# Patient Record
Sex: FEMALE | Race: WHITE | Employment: OTHER | URBAN - METROPOLITAN AREA
[De-identification: names, ages, dates, MRNs, and addresses within clinical notes are randomized per-mention and may not be internally consistent; named-entity substitution may affect disease eponyms.]

---

## 2018-03-08 PROBLEM — M17.12 OSTEOARTHRITIS OF LEFT KNEE: Status: ACTIVE | Noted: 2018-03-08

## 2018-04-07 ENCOUNTER — HOSPITAL ENCOUNTER (EMERGENCY)
Age: 68
Discharge: HOME | End: 2018-04-07
Attending: EMERGENCY MEDICINE
Payer: COMMERCIAL

## 2018-04-07 VITALS
BODY MASS INDEX: 41.64 KG/M2 | OXYGEN SATURATION: 97 % | TEMPERATURE: 98 F | HEIGHT: 63 IN | WEIGHT: 235 LBS | SYSTOLIC BLOOD PRESSURE: 188 MMHG | RESPIRATION RATE: 16 BRPM | DIASTOLIC BLOOD PRESSURE: 70 MMHG | HEART RATE: 78 BPM

## 2018-04-07 DIAGNOSIS — R04.0 EPISTAXIS: Primary | ICD-10-CM

## 2018-04-07 PROCEDURE — 99283 EMERGENCY DEPT VISIT LOW MDM: CPT | Performed by: EMERGENCY MEDICINE

## 2018-04-07 NOTE — ED TRIAGE NOTES
Pt complains of frequent nose bleeds for the past 3 days. States tonight she was unable to stop the bleeding. Denies blood thinners. States 81mg asa today and tylenol for knee pain.

## 2018-04-08 NOTE — ED NOTES
I have reviewed discharge instructions with the patient. The patient verbalized understanding. Patient left ED via Discharge Method: ambulatory to Home with her   Opportunity for questions and clarification provided. Patient given 0 scripts. To continue your aftercare when you leave the hospital, you may receive an automated call from our care team to check in on how you are doing. This is a free service and part of our promise to provide the best care and service to meet your aftercare needs.  If you have questions, or wish to unsubscribe from this service please call [phone]. Thank you for Choosing our [facility].

## 2018-04-08 NOTE — DISCHARGE INSTRUCTIONS
Nosebleeds: Care Instructions  Your Care Instructions    Nosebleeds are common, especially if you have colds or allergies. Many things can cause a nosebleed. Some nosebleeds stop on their own with pressure. Others need packing. Some get cauterized (sealed). If you have gauze or other packing materials in your nose, you will need to follow up with your doctor to have the packing removed. You may need more treatment if you get nosebleeds a lot. The doctor has checked you carefully, but problems can develop later. If you notice any problems or new symptoms, get medical treatment right away. Follow-up care is a key part of your treatment and safety. Be sure to make and go to all appointments, and call your doctor if you are having problems. It's also a good idea to know your test results and keep a list of the medicines you take. How can you care for yourself at home? · If you get another nosebleed:  ¨ Sit up and tilt your head slightly forward. This keeps blood from going down your throat. ¨ Use your thumb and index finger to pinch your nose shut for 10 minutes. Use a clock. Do not check to see if the bleeding has stopped before the 10 minutes are up. If the bleeding has not stopped, pinch your nose shut for another 10 minutes. ¨ When the bleeding has stopped, try not to pick, rub, or blow your nose for 12 hours. Avoiding these things helps keep your nose from bleeding again. · If your doctor prescribed antibiotics, take them as directed. Do not stop taking them just because you feel better. You need to take the full course of antibiotics. To prevent nosebleeds  · Do not blow your nose too hard. · Try not to lift or strain after a nosebleed. · Raise your head on a pillow while you sleep. · Put a thin layer of a saline- or water-based nasal gel, such as NasoGel, inside your nose. Put it on the septum, which divides your nostrils. This will prevent dryness that can cause nosebleeds.   · Use a vaporizer or humidifier to add moisture to your bedroom. Follow the directions for cleaning the machine. · Do not use aspirin, ibuprofen (Advil, Motrin), or naproxen (Aleve) for 36 to 48 hours after a nosebleed unless your doctor tells you to. You can use acetaminophen (Tylenol) for pain relief. · Talk to your doctor about stopping any other medicines you are taking. Some medicines may make you more likely to get a nosebleed. · Do not use cold medicines or nasal sprays without first talking to your doctor. They can make your nose dry. When should you call for help? Call 911 anytime you think you may need emergency care. For example, call if:  ? · You passed out (lost consciousness). ?Call your doctor now or seek immediate medical care if:  ? · You get another nosebleed and your nose is still bleeding after you have applied pressure 3 times for 10 minutes each time (30 minutes total). ? · There is a lot of blood running down the back of your throat even after you pinch your nose and tilt your head forward. ? · You have a fever. ? · You have sinus pain. ? Watch closely for changes in your health, and be sure to contact your doctor if:  ? · You get nosebleeds often, even if they stop. ? · You do not get better as expected. Where can you learn more? Go to http://analy-leif.info/. Enter S156 in the search box to learn more about \"Nosebleeds: Care Instructions. \"  Current as of: March 20, 2017  Content Version: 11.4  © 9824-0394 [employer]. Care instructions adapted under license by [employer] (which disclaims liability or warranty for this information). If you have questions about a medical condition or this instruction, always ask your healthcare professional. [address] any warranty or liability for your use of this information.

## 2018-04-08 NOTE — ED PROVIDER NOTES
HPI Comments: 62-year-old white female presents with bleeding from her left nare off-and-on for the past 3 days. Tonight it bled for approximately 15-20 minutes therefore she came in. No pain. No trauma. She does take 81 mg aspirin but no other blood thinners. No other areas of bleeding. Patient is a 74 y.o. female presenting with epistaxis. The history is provided by the patient. Epistaxis           Past Medical History:   Diagnosis Date    Breast cancer ([facility] [address] 2007    DJD (degenerative joint disease) 4/30/2013    Bilateral knees-    Essential hypertension, benign 4/30/2013    Well controlled on current regimen.  Continue meds and f/u in 6 months    Generalized osteoarthrosis, involving multiple sites 4/30/2013    Hand eczema 4/30/2013    Mixed hyperlipidemia 4/30/2013    Morbid obesity ([facility] [address] 4/30/2013    Routine general medical examination at a health care facility 4/30/2013    Type II or unspecified type diabetes mellitus without mention of complication, not stated as uncontrolled 4/30/2013    Unspecified sleep apnea 4/30/2013    cpap       Past Surgical History:   Procedure Laterality Date    BREAST SURGERY PROCEDURE UNLISTED  08/2010    let breast cyst aspirated    HX BREAST LUMPECTOMY Right 2007    cancer    HX CHOLECYSTECTOMY  3/2005    HX TONSILLECTOMY      HX TUBAL LIGATION           Family History:   Problem Relation Age of Onset    Heart Disease Mother     Elevated Lipids Mother     Hypertension Mother     Diabetes Mother     Dementia Father 80    Heart Disease Father     Elevated Lipids Father     Hypertension Father     Diabetes Father     Cancer Sister 46     breast    Stroke Maternal Grandmother        Social History     Social History    Marital status:      Spouse name: N/A    Number of children: N/A    Years of education: N/A     Occupational History    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 Son lives in [place]. Daughter lives in [place]. ALLERGIES: Ambien [zolpidem]; Erythromycin; and Norvasc [amlodipine]    Review of Systems   Constitutional: Negative for fever. Respiratory: Negative for shortness of breath. Neurological: Negative for headaches. Vitals:    04/07/18 1912   BP: 195/74   Pulse: 79   Resp: 16   Temp: 98.1 °F (36.7 °C)   SpO2: 96%   Weight: 106.6 kg (235 lb)   Height: 5' 3\" (1.6 m)            Physical Exam   Constitutional: She appears well-developed and well-nourished. No distress. HENT:   Head: Normocephalic and atraumatic. Mouth/Throat: Oropharynx is clear and moist.   Irritation and erythema to the left septum. No active bleeding. Eyes: Conjunctivae are normal. Pupils are equal, round, and reactive to light. Neck: Normal range of motion. Neck supple. Cardiovascular: Normal rate. Pulmonary/Chest: Effort normal.   Neurological: She is alert. Skin: Skin is warm and dry. Psychiatric: She has a normal mood and affect. Nursing note and vitals reviewed. MDM  Number of Diagnoses or Management Options  Diagnosis management comments: Bleeding likely due to mild mucosal inflammation. Have advised her to use saline nose spray and Vaseline. Nose clamp as needed for further bleeding.         ED Course       Procedures

## 2018-08-27 ENCOUNTER — HOSPITAL ENCOUNTER (OUTPATIENT)
Dept: PHYSICAL THERAPY | Age: 68
Discharge: HOME | End: 2018-08-27
Payer: MEDICARE

## 2018-08-27 ENCOUNTER — HOSPITAL ENCOUNTER (OUTPATIENT)
Dept: SURGERY | Age: 68
Discharge: HOME | End: 2018-08-27
Payer: MEDICARE

## 2018-08-27 PROBLEM — R73.09 ELEVATED HEMOGLOBIN A1C: Status: ACTIVE | Noted: 2018-08-27

## 2018-08-27 PROBLEM — R79.89 ELEVATED SERUM CREATININE: Status: ACTIVE | Noted: 2018-08-27

## 2018-08-27 PROBLEM — R82.90 ABNORMAL URINALYSIS: Status: ACTIVE | Noted: 2018-08-27

## 2018-08-27 LAB
ANION GAP SERPL CALC-SCNC: 11 MMOL/L (ref 7–16)
APPEARANCE UR: ABNORMAL
APTT PPP: 28.3 SEC (ref 23.2–35.3)
ATRIAL RATE: 65 BPM
BACTERIA SPEC CULT: ABNORMAL
BACTERIA URNS QL MICRO: ABNORMAL /HPF
BILIRUB UR QL: NEGATIVE
BUN SERPL-MCNC: 35 MG/DL (ref 8–23)
CALCIUM SERPL-MCNC: 9.3 MG/DL (ref 8.3–10.4)
CALCULATED P AXIS, ECG09: 15 DEGREES
CALCULATED R AXIS, ECG10: 16 DEGREES
CALCULATED T AXIS, ECG11: 46 DEGREES
CHLORIDE SERPL-SCNC: 98 MMOL/L (ref 98–107)
CO2 SERPL-SCNC: 24 MMOL/L (ref 21–32)
COLOR UR: YELLOW
CREAT SERPL-MCNC: 1.53 MG/DL (ref 0.6–1)
DIAGNOSIS, 93000: NORMAL
EPI CELLS #/AREA URNS HPF: ABNORMAL /HPF
ERYTHROCYTE [DISTWIDTH] IN BLOOD BY AUTOMATED COUNT: 12.4 %
GLUCOSE SERPL-MCNC: 459 MG/DL (ref 65–100)
GLUCOSE UR STRIP.AUTO-MCNC: >1000 MG/DL
HCT VFR BLD AUTO: 35.3 % (ref 35.8–46.3)
HGB BLD-MCNC: 11.5 G/DL (ref 11.7–15.4)
HGB UR QL STRIP: NEGATIVE
INR PPP: 0.9
KETONES UR QL STRIP.AUTO: NEGATIVE MG/DL
LEUKOCYTE ESTERASE UR QL STRIP.AUTO: ABNORMAL
MCH RBC QN AUTO: 27.3 PG (ref 26.1–32.9)
MCHC RBC AUTO-ENTMCNC: 32.6 G/DL (ref 31.4–35)
MCV RBC AUTO: 83.8 FL (ref 79.6–97.8)
MUCOUS THREADS URNS QL MICRO: ABNORMAL /LPF
NITRITE UR QL STRIP.AUTO: POSITIVE
NRBC # BLD: 0 K/UL (ref 0–0.2)
P-R INTERVAL, ECG05: 182 MS
PH UR STRIP: 5 [PH] (ref 5–9)
PLATELET # BLD AUTO: 457 K/UL (ref 150–450)
PMV BLD AUTO: 9.8 FL (ref 9.4–12.3)
POTASSIUM SERPL-SCNC: 4.5 MMOL/L (ref 3.5–5.1)
PROT UR STRIP-MCNC: NEGATIVE MG/DL
PROTHROMBIN TIME: 12.3 SEC (ref 11.5–14.5)
Q-T INTERVAL, ECG07: 382 MS
QRS DURATION, ECG06: 90 MS
QTC CALCULATION (BEZET), ECG08: 397 MS
RBC # BLD AUTO: 4.21 M/UL (ref 4.05–5.2)
RBC #/AREA URNS HPF: ABNORMAL /HPF
SERVICE CMNT-IMP: ABNORMAL
SODIUM SERPL-SCNC: 133 MMOL/L (ref 136–145)
SP GR UR REFRACTOMETRY: 1.02 (ref 1–1.02)
UROBILINOGEN UR QL STRIP.AUTO: 0.2 EU/DL (ref 0.2–1)
VENTRICULAR RATE, ECG03: 65 BPM
WBC # BLD AUTO: 10.5 K/UL (ref 4.3–11.1)
WBC URNS QL MICRO: ABNORMAL /HPF

## 2018-08-27 PROCEDURE — 80048 BASIC METABOLIC PNL TOTAL CA: CPT

## 2018-08-27 PROCEDURE — 85730 THROMBOPLASTIN TIME PARTIAL: CPT

## 2018-08-27 PROCEDURE — 81001 URINALYSIS AUTO W/SCOPE: CPT

## 2018-08-27 PROCEDURE — 87088 URINE BACTERIA CULTURE: CPT

## 2018-08-27 PROCEDURE — 87186 SC STD MICRODIL/AGAR DIL: CPT

## 2018-08-27 PROCEDURE — 85610 PROTHROMBIN TIME: CPT

## 2018-08-27 PROCEDURE — G8979 MOBILITY GOAL STATUS: HCPCS

## 2018-08-27 PROCEDURE — 87641 MR-STAPH DNA AMP PROBE: CPT

## 2018-08-27 PROCEDURE — G8978 MOBILITY CURRENT STATUS: HCPCS

## 2018-08-27 PROCEDURE — 87086 URINE CULTURE/COLONY COUNT: CPT

## 2018-08-27 PROCEDURE — 97161 PT EVAL LOW COMPLEX 20 MIN: CPT

## 2018-08-27 PROCEDURE — 77030027138 HC INCENT SPIROMETER -A

## 2018-08-27 PROCEDURE — 85027 COMPLETE CBC AUTOMATED: CPT

## 2018-08-27 PROCEDURE — 93005 ELECTROCARDIOGRAM TRACING: CPT | Performed by: ANESTHESIOLOGY

## 2018-08-27 PROCEDURE — 36415 COLL VENOUS BLD VENIPUNCTURE: CPT

## 2018-08-27 PROCEDURE — G8980 MOBILITY D/C STATUS: HCPCS

## 2018-08-27 RX ORDER — ASPIRIN 81 MG/1
81 TABLET ORAL DAILY
COMMUNITY
End: 2019-05-25

## 2018-08-27 RX ORDER — CHROMIUM PICOLINATE 200 MCG
1 TABLET ORAL DAILY
COMMUNITY
End: 2018-09-18 | Stop reason: ALTCHOICE

## 2018-08-27 NOTE — PERIOP NOTES
Dr. Pricilla Padilla discussed pt's current medical status with surgeon (Dr. Ivonne Duffy). Pt's surgery to be rescheduled pending better control of diabetes. Per Dr. Pricilla Padilla, go ahead and scheduled Echo. Called Echo department. Echo scheduled at [facility] 9/18/18 at 2pm with arrival at main entrance at 1330. Called pt, informed of scheduled Echo. Also informed pt that per anesthesia, even if surgery is rescheduled she should still have echo performed. Pt verbalized understanding and agreeable to scheduled date/time.

## 2018-08-27 NOTE — ADVANCED PRACTICE NURSE
Total Joint Surgery Preoperative Chart Review      Patient ID:  Gwendolyn Maurice  [id number]  83 y.o.  1950  Surgeon: Dr. Mari Morales  Date of Surgery: 9/11/2018  Procedure: Total Left Knee Arthroplasty  Primary Care Physician: Della Mena [phone]  Specialty Physician(s):      Subjective:   Gwendolyn Maurice is a 79 y.o. WHITE OR  female who presents for preoperative evaluation for Total Left Knee arthroplasty. This is a preoperative chart review note based on data collected by the nurse at the surgical Pre-Assessment visit. Past Medical History:   Diagnosis Date    Breast cancer ([facility] [address] 2007    right breast    DJD (degenerative joint disease) 04/30/2013    Bilateral knees    Essential hypertension, benign 04/30/2013    on med for control     Generalized osteoarthrosis, involving multiple sites 4/30/2013    Hand eczema 4/30/2013    Heart murmur     usure of when she was diagnosed; denies echo    Mixed hyperlipidemia 04/30/2013    on med    Morbid obesity ([facility] [address] 04/30/2013    BMI=40.3    Routine general medical examination at a [facility] care facility 4/30/2013    Type II or unspecified type diabetes mellitus without mention of complication, not stated as uncontrolled 04/30/2013    oral and insulin meds; checks bs bid; average bs 300; last A1C=11.0 on 06/06/18    Unspecified sleep apnea 04/30/2013    c-pap; instructed to bring dos.        Past Surgical History:   Procedure Laterality Date    BREAST SURGERY PROCEDURE UNLISTED  08/2010    let breast cyst aspirated    HX BREAST LUMPECTOMY Right 2007    cancer    HX CHOLECYSTECTOMY  3/2005    HX TONSILLECTOMY      HX TUBAL LIGATION       Family History   Problem Relation Age of Onset    Heart Disease Mother     Elevated Lipids Mother     Hypertension Mother     Diabetes Mother     Dementia Father 80    Heart Disease Father     Elevated Lipids Father     Hypertension Father     Diabetes Father     No Known Problems Sister     No Known Problems Brother     Cancer Sister     Stroke Maternal Grandmother       Social History   Substance Use Topics    Smoking status: Never Smoker    Smokeless tobacco: Never Used    Alcohol use No       Prior to Admission medications    Medication Sig Start Date End Date Taking? Authorizing Provider   Calcium-Cholecalciferol, D3, (CALCIUM 600 WITH VITAMIN D3) 600 mg(1,500mg) -400 unit cap Take 1 Tab by mouth daily. Yes Historical Provider   aspirin delayed-release 81 mg tablet Take 81 mg by mouth daily. Take / use AM day of surgery  per anesthesia protocols. Yes Historical Provider   insulin lispro (HUMALOG) 100 unit/mL kwikpen 10 units three times a day before meals 6/19/18  Yes Branson Severin, NP   melatonin tab tablet Take 5 mg by mouth nightly. Yes Historical Provider   metFORMIN ER (GLUCOPHAGE XR) 500 mg tablet Take two twice daily  Indications: type 2 diabetes mellitus  Patient taking differently: Take 1,000 mg by mouth two (2) times a day. Take two twice daily  Indications: type 2 diabetes mellitus 6/6/18  Yes Branson Severin, NP   SITagliptin (JANUVIA) 100 mg tablet Take 1 Tab by mouth daily. 6/6/18  Yes Branson Severin, NP   lisinopril-hydroCHLOROthiazide (PRINZIDE, ZESTORETIC) 20-12.5 mg per tablet Take 2 Tabs by mouth daily. 6/6/18  Yes Branson Severin, NP   rosuvastatin (CRESTOR) 10 mg tablet Take 1 Tab by mouth nightly. 6/6/18  Yes Branson Severin, NP   diclofenac EC (VOLTAREN) 50 mg EC tablet Take 1 Tab by mouth two (2) times a day. 6/6/18  Yes Branson Severin, NP   insulin detemir U-100 (LEVEMIR U-100 INSULIN) 100 unit/mL injection 85 units at hs  Patient taking differently: 85 Units by SubCUTAneous route nightly. Take 68 units the night before surgery on 09/10/18  Indications: type 2 diabetes mellitus 6/6/18  Yes Branson Severin, NP   dilTIAZem CD (CARDIZEM CD) 240 mg ER capsule Take 1 Cap by mouth daily.  6/6/18  Yes Branson Severin, NP     Allergies   Allergen Reactions    Ambien [Zolpidem] Palpitations     Ambien CR heart race.  ambien-hypnotics hallucinations    Erythromycin Other (comments)     Increased heart rate     Norvasc [Amlodipine] Other (comments)     Leg swelling          Objective:     Physical Exam:   Patient Vitals for the past 24 hrs:   Temp Pulse BP SpO2   08/27/18 0905 96.5 °F (35.8 °C) 69 134/66 98 %       ECG:    EKG Results     Procedure 720 Value Units Date/Time    EKG, 12 LEAD, INITIAL [[id number]] Collected:  08/27/18 0854    Order Status:  Completed Updated:  08/27/18 1258     Ventricular Rate 65 BPM      Atrial Rate 65 BPM      P-R Interval 182 ms      QRS Duration 90 ms      Q-T Interval 382 ms      QTC Calculation (Bezet) 397 ms      Calculated P Axis 15 degrees      Calculated R Axis 16 degrees      Calculated T Axis 46 degrees      Diagnosis --     Normal sinus rhythm  Low voltage QRS  Borderline ECG  No previous ECGs available  Confirmed by BHAVESH PETIT (), Cristi Malloy ([id number]) on 8/27/2018 12:58:17 PM            Data Review:   Labs:   Recent Results (from the past 24 hour(s))   CBC W/O DIFF    Collection Time: 08/27/18  8:00 AM   Result Value Ref Range    WBC 10.5 4.3 - 11.1 K/uL    RBC 4.21 4.05 - 5.2 M/uL    HGB 11.5 (L) 11.7 - 15.4 g/dL    HCT 35.3 (L) 35.8 - 46.3 %    MCV 83.8 79.6 - 97.8 FL    MCH 27.3 26.1 - 32.9 PG    MCHC 32.6 31.4 - 35.0 g/dL    RDW 12.4 %    PLATELET 412 (H) 078 - 450 K/uL    MPV 9.8 9.4 - 12.3 FL    ABSOLUTE NRBC 0.00 0.0 - 0.2 K/uL   METABOLIC PANEL, BASIC    Collection Time: 08/27/18  8:00 AM   Result Value Ref Range    Sodium 133 (L) 136 - 145 mmol/L    Potassium 4.5 3.5 - 5.1 mmol/L    Chloride 98 98 - 107 mmol/L    CO2 24 21 - 32 mmol/L    Anion gap 11 7 - 16 mmol/L    Glucose 459 (HH) 65 - 100 mg/dL    BUN 35 (H) 8 - 23 MG/DL    Creatinine 1.53 (H) 0.6 - 1.0 MG/DL    GFR est AA 44 (L) >60 ml/min/1.73m2    GFR est non-AA 36 (L) >60 ml/min/1.73m2    Calcium 9.3 8.3 - 10.4 MG/DL   PROTHROMBIN TIME + INR    Collection Time: 08/27/18  8:00 AM   Result Value Ref Range    Prothrombin time 12.3 11.5 - 14.5 sec    INR 0.9     PTT    Collection Time: 08/27/18  8:00 AM   Result Value Ref Range    aPTT 28.3 23.2 - 35.3 SEC   URINALYSIS W/ RFLX MICROSCOPIC    Collection Time: 08/27/18  8:00 AM   Result Value Ref Range    Color YELLOW      Appearance CLOUDY      Specific gravity 1.021 1.001 - 1.023      pH (UA) 5.0 5.0 - 9.0      Protein NEGATIVE  NEG mg/dL    Glucose >1000 mg/dL    Ketone NEGATIVE  NEG mg/dL    Bilirubin NEGATIVE  NEG      Blood NEGATIVE  NEG      Urobilinogen 0.2 0.2 - 1.0 EU/dL    Nitrites POSITIVE (A) NEG      Leukocyte Esterase MODERATE (A) NEG      WBC 20-50 0 /hpf    RBC 0-3 0 /hpf    Epithelial cells 0-3 0 /hpf    Bacteria 2+ (H) 0 /hpf    Mucus 1+ (H) 0 /lpf   EKG, 12 LEAD, INITIAL    Collection Time: 08/27/18  8:54 AM   Result Value Ref Range    Ventricular Rate 65 BPM    Atrial Rate 65 BPM    P-R Interval 182 ms    QRS Duration 90 ms    Q-T Interval 382 ms    QTC Calculation (Bezet) 397 ms    Calculated P Axis 15 degrees    Calculated R Axis 16 degrees    Calculated T Axis 46 degrees    Diagnosis       Normal sinus rhythm  Low voltage QRS  Borderline ECG  No previous ECGs available  Confirmed by BHAVESH PETIT (), Bennie Rizvi ([id number]) on 8/27/2018 12:58:17 PM     MSSA/MRSA SC BY PCR, NASAL SWAB    Collection Time: 08/27/18  9:44 AM   Result Value Ref Range    Special Requests: NO SPECIAL REQUESTS      Culture result: (A)       MRSA target DNA not detected, SA target DNA detected. A MRSA negative, SA positive test result does not preclude MRSA nasal colonization.          Problem List:  )  Patient Active Problem List   Diagnosis Code    Hand eczema L30.9    DJD (degenerative joint disease) M19.90    Essential hypertension, benign I10    Mixed hyperlipidemia E78.2    Diabetes mellitus type 2, controlled ([address] E11.9    Unspecified sleep apnea G47.30    Morbid obesity ([facility] [address] E66.01    Routine general medical examination at a health care facility Z00.00    Generalized osteoarthrosis, involving multiple sites M15.9    Breast cancer ([facility] [address] C50.919    Glaucoma H40.9    Osteoarthritis of left knee M17.12    Elevated hemoglobin A1c R73.09    Elevated serum creatinine R79.89    Abnormal urinalysis R82.90       Total Joint Surgery Pre-Assessment Recommendations:          Abnormal urinalysis. Culture pending. Will call in antibiotic if indicated. Patient is to wear home CPAP during hospitalization. Patient with multiple comorbidities including:  BMI greater than 35, sleep apnea, advanced age 79. Patient would benefit from inpatient hospitalization with total knee surgery. 6/6/2018 Elevated A1C 11.0. Fasting glucose today 459. Patient was told to make appointment ASAP to get this under control. Renal protocol initiated. The patient's chart will be flagged renal risk. Renal RisK Alerts:  1. Caution with use of muscle relaxants and sedatives with reduced renal function  2. Caution with total amount of narcotics used   3. Avoid morphine if patient has reduced renal function due to accumulations of the highly active metabolite, morphine-6-glucuronide, which can lead to sedation and respiratory depression  4. Avoid nephrotoxic drugs such as ONTIVEROS inhibitors  5. Consider volume status monitoring in addition to Maya  6.  Ensure hand-off to hospitalist for appropriate perioperative IV fluid management             Signed By: BILLY Jimenez    August 27, 2018

## 2018-08-27 NOTE — PROGRESS NOTES
Latrell Claros  : [birth year](10 y.o.) [facility] at [address], [place] [address].  [phone]       Physical Therapy Prehab Plan of Treatment and Evaluation Summary:2018    ICD-10: Treatment Diagnosis:   · Pain in Left Knee (M25.562)  · Stiffness of Left Knee, Not elsewhere classified (M25.662)  · Difficulty in walking, Not elsewhere classified (R26.2)  Precautions/Allergies:   Ambien [zolpidem]; Erythromycin; and Norvasc [amlodipine]  MEDICAL/REFERRING DIAGNOSIS:  Unilateral primary osteoarthritis, left knee [M17.12]  REFERRING PHYSICIAN: Carol Kaye MD  DATE OF SURGERY: 18   Assessment:   Comments:  Pt. Used a w/c for prehab. She is able to ambulate short distances. Bilateral knee rom is very limited. We discussed importance of rom after surgery and motivated her to work hard to achieve it as it will be a challenge. She needs a right tka. She plans to go home with spouse. PROBLEM LIST (Impacting functional limitations):  Ms. Ron Salinas presents with the following left lower extremity(s) problems:  1. Strength  2. Range of Motion  3. Home Exercise Program  4. Pain   INTERVENTIONS PLANNED:  1. Home Exercise Program  2. Educational Discussion     TREATMENT PLAN: Effective Dates: 2018 TO 2018. Frequency/Duration: Patient to continue to perform home exercise program at least twice per day up until her surgery. GOALS: (Goals have been discussed and agreed upon with patient.)  Discharge Goals: Time Frame: 1 Day  1. Patient will demonstrate independence with a home exercise program designed to increase strength, range of motion and pain control to minimize functional deficits and optimize patient for total joint replacement. Rehabilitation Potential For Stated Goals: Good  Regarding Rodger Valeria Tyrell's therapy, I certify that the treatment plan above will be carried out by a therapist or under their direction.   Thank you for this referral,  Cuauhtemoc Gibbs, SALDE               HISTORY:   Present Symptoms:  Pain Intensity 1:  (8 at worst)  Pain Location 1: Knee   History of Present Injury/Illness (Reason for Referral):  Medical/Referring Diagnosis: Unilateral primary osteoarthritis, left knee [M17.12]   Past Medical History/Comorbidities:   Ms. Odette Claros  has a past medical history of Breast cancer ([facility] [address] (2007); DJD (degenerative joint disease) (4/30/2013); Essential hypertension, benign (4/30/2013); Generalized osteoarthrosis, involving multiple sites (4/30/2013); Hand eczema (4/30/2013); Mixed hyperlipidemia (4/30/2013); Morbid obesity ([facility] [address] (4/30/2013); Routine general medical examination at a health care facility (4/30/2013); Type II or unspecified type diabetes mellitus without mention of complication, not stated as uncontrolled (4/30/2013); and Unspecified sleep apnea (4/30/2013). Ms. Oedtte Claros  has a past surgical history that includes pr breast surgery procedure unlisted (08/2010); hx cholecystectomy (3/2005); hx tonsillectomy; hx tubal ligation; and hx breast lumpectomy (Right, 2007).   Social History/Living Environment:   Home Environment: Private residence  # Steps to Enter: 4  Rails to Enter: Yes  Hand Rails : Right  Wheelchair Ramp: Yes  One/Two Story Residence: One story  Living Alone: No  Support Systems: Spouse/Significant Other/Partner  Patient Expects to be Discharged to[de-identified] Private residence  Current DME Used/Available at Home: Wheelchair, Grab bars  Tub or Shower Type: Tub/Shower combination  Work/Activity:  retired  Dominant Side:  RIGHT  Current Medications:  See Pre-assessment nursing note   Number of Personal Factors/Comorbidities that affect the Plan of Care: 3+: HIGH COMPLEXITY   EXAMINATION:   ADLs (Current Functional Status):   Ambulation:  [x] Independent  [] Walk Indoors Only  [] Walk Outdoors  [] Use Assistive Device  [x] Use Wheelchair Only Dressing:  [x] Independent  Requires Assistance from Someone for:  [] Sock/Shoes  [] Pants  [] Everything   Bathing/Showering:   [x] Independent  [] Requires Assistance from Someone  [] Sponge Bath Only Household Activities:  [] Routine house and yard work  [x] Light Housework Only  [] None   Observation/Orthostatic Postural Assessment:   Exceptions to [employer] shoulders  ROM/Flexibility:   Gross Assessment: Yes  AROM: Generally decreased, functional (right LE, right knee very limited)                LLE Assessment  LLE Assessment (WDL): Exception to WDL  LLE AROM  L Knee Flexion: 55  L Knee Extension: 12          Strength:   Gross Assessment: Yes  Strength: Generally decreased, functional (right LE, knee limited)       LLE Strength  L Knee Flexion: 3  L Knee Extension: 3          Functional Mobility:    Gross Assessment: Yes    Gait Description (WDL): Exceptions to WDL  Stand to Sit: Stand-by assistance, Additional time  Sit to Stand: Stand-by assistance, Additional time  Distance (ft): 25 Feet (ft)  Ambulation - Level of Assistance: Stand-by assistance;Supervision  Speed/Radha: Delayed  Step Length: Left shortened;Right shortened  Gait Abnormalities: Antalgic;Trunk sway increased          Balance:    Sitting: Intact  Standing: Intact, With support   Body Structures Involved:  1. Bones  2. Joints  3. Muscles  4. Ligaments Body Functions Affected:  1. Movement Related Activities and Participation Affected:  1. Mobility   Number of elements that affect the Plan of Care: 3: MODERATE COMPLEXITY   CLINICAL PRESENTATION:   Presentation: Stable and uncomplicated: LOW COMPLEXITY   CLINICAL DECISION MAKING:   Outcome Measure: Tool Used: Lower Extremity Functional Scale (LEFS)  Score:  Initial: 28/80 Most Recent: X/80 (Date: -- )   Interpretation of Score: 20 questions each scored on a 5 point scale with 0 representing \"extreme difficulty or unable to perform\" and 4 representing \"no difficulty\". The lower the score, the greater the functional disability. 80/80 represents no disability. Minimal detectable change is 9 points. Score 80 79-65 64-49 48-33 32-17 16-1 0   Modifier CH CI CJ CK CL CM CN     ? Mobility - Walking and Moving Around:     - CURRENT STATUS: CL - 60%-79% impaired, limited or restricted    - GOAL STATUS: CL - 60%-79% impaired, limited or restricted    - D/C STATUS:  CL - 60%-79% impaired, limited or restricted  Medical Necessity:   · Ms. Suzie Eng is expected to optimize her lower extremity strength and ROM in preparation for joint replacement surgery. Reason for Services/Other Comments:  · Achieve baseline assesment of musculoskeletal system, functional mobility and home environment. , educate in PT HEP in preparation for surgery, educate in hospital plan of care. Use of outcome tool(s) and clinical judgement create a POC that gives a: Clear prediction of patient's progress: LOW COMPLEXITY   TREATMENT:   Treatment/Session Assessment:  Patient was instructed in PT- HEP to increase strength and ROM in LEs. Answered all questions. · Post session pain:  Knee pain  · Compliance with Program/Exercises: Will assess as treatment progresses.   Total Treatment Duration:  PT Patient Time In/Time Out  Time In: 0715  Time Out: [address], PT

## 2018-08-27 NOTE — PERIOP NOTES
Dr. Pamela Christian,     Your patient was seen in [place] [address] today. I am attaching the results of the labs for your to review and follow-up with if necessary. If you would like to order additional labs or tests please enter into [facility] or fax to [phone]. Please do not respond to this message as my mailbox is not monitored. You may call [phone] with questions or concerns. Urine culture ordered by Meagan Beatty NP.      Recent Results (from the past 12 hour(s))   CBC W/O DIFF    Collection Time: 08/27/18  8:00 AM   Result Value Ref Range    WBC 10.5 4.3 - 11.1 K/uL    RBC 4.21 4.05 - 5.2 M/uL    HGB 11.5 (L) 11.7 - 15.4 g/dL    HCT 35.3 (L) 35.8 - 46.3 %    MCV 83.8 79.6 - 97.8 FL    MCH 27.3 26.1 - 32.9 PG    MCHC 32.6 31.4 - 35.0 g/dL    RDW 12.4 %    PLATELET 067 (H) 098 - 450 K/uL    MPV 9.8 9.4 - 12.3 FL    ABSOLUTE NRBC 0.00 0.0 - 0.2 K/uL   METABOLIC PANEL, BASIC    Collection Time: 08/27/18  8:00 AM   Result Value Ref Range    Sodium 133 (L) 136 - 145 mmol/L    Potassium 4.5 3.5 - 5.1 mmol/L    Chloride 98 98 - 107 mmol/L    CO2 24 21 - 32 mmol/L    Anion gap 11 7 - 16 mmol/L    Glucose 459 (HH) 65 - 100 mg/dL    BUN 35 (H) 8 - 23 MG/DL    Creatinine 1.53 (H) 0.6 - 1.0 MG/DL    GFR est AA 44 (L) >60 ml/min/1.73m2    GFR est non-AA 36 (L) >60 ml/min/1.73m2    Calcium 9.3 8.3 - 10.4 MG/DL   PROTHROMBIN TIME + INR    Collection Time: 08/27/18  8:00 AM   Result Value Ref Range    Prothrombin time 12.3 11.5 - 14.5 sec    INR 0.9     PTT    Collection Time: 08/27/18  8:00 AM   Result Value Ref Range    aPTT 28.3 23.2 - 35.3 SEC   URINALYSIS W/ RFLX MICROSCOPIC    Collection Time: 08/27/18  8:00 AM   Result Value Ref Range    Color YELLOW      Appearance CLOUDY      Specific gravity 1.021 1.001 - 1.023      pH (UA) 5.0 5.0 - 9.0      Protein NEGATIVE  NEG mg/dL    Glucose >1000 mg/dL    Ketone NEGATIVE  NEG mg/dL    Bilirubin NEGATIVE  NEG      Blood NEGATIVE  NEG      Urobilinogen 0.2 0.2 - 1.0 EU/dL    Nitrites POSITIVE (A) NEG      Leukocyte Esterase MODERATE (A) NEG      WBC 20-50 0 /hpf    RBC 0-3 0 /hpf    Epithelial cells 0-3 0 /hpf    Bacteria 2+ (H) 0 /hpf    Mucus 1+ (H) 0 /lpf   EKG, 12 LEAD, INITIAL    Collection Time: 08/27/18  8:54 AM   Result Value Ref Range    Ventricular Rate 65 BPM    Atrial Rate 65 BPM    P-R Interval 182 ms    QRS Duration 90 ms    Q-T Interval 382 ms    QTC Calculation (Bezet) 397 ms    Calculated P Axis 15 degrees    Calculated R Axis 16 degrees    Calculated T Axis 46 degrees    Diagnosis       Normal sinus rhythm  Low voltage QRS  Borderline ECG  No previous ECGs available     MSSA/MRSA SC BY PCR, NASAL SWAB    Collection Time: 08/27/18  9:44 AM   Result Value Ref Range    Special Requests: NO SPECIAL REQUESTS      Culture result: (A)       MRSA target DNA not detected, SA target DNA detected. A MRSA negative, SA positive test result does not preclude MRSA nasal colonization.

## 2018-08-27 NOTE — PERIOP NOTES
Contacted Echo scheduling department for [facility]. First available echo appointment 9/18/18. Waiting for call back from Dr. Anjelica Hopson office to confirm surgery to remain scheduled for 9/11/18 or to be rescheduled d/t pt's uncontrolled diabetes.

## 2018-08-27 NOTE — PERIOP NOTES
Patient verified name, , and surgery as listed in [facility]. Type 3 surgery, walk in assessment complete. Labs per surgeon: CBC, BMP, U/A, PT/PTT ; results within [facility] protocols except glucose of 459 and creatinine of 1.53. U/A abnormal. MRSA nasal swab pending. Labs per anesthesia protocol: included in surgeons orders. EKG: done today; within [facility] protocols. Ochoa at Dr. Olivier Jarquin office called over stating she received a call from the lab with critical Glucose of 459. Per Lab note:   \"RESULTS VERIFIED, PHONED TO AND READ BACK BY   CARMELLA MCCAIN FOR DR Javier Berger AT 8787 ON 18 KD\"    Ashkan Argueta NP notified of abnormal U/A result, elevated creatinine, and glucose of 459. Requests that patient is seen by PCP to address elevated glucose and received order for urine culture. Called PCP office Dipak Wolf NP) and made appointment for patient to be seen today at 1:45 pm to have glucose addressed. Patient is leaving for vacation on 18-18. Patient stated today that glucose has been in the 300's lately and this morning was 350. Patient aware if glucose is > 300 day of surgery that surgery maybe cancelled. Called Dr. Yaakov Belcher with anesthesia two different times to address heart murmur history without echo and glucose of 459. The first call he was unable to come to the phone. The second call he cut me off and said he would come over. After waiting almost 30 minutes Dr. Vickey Saxena with anesthesia was called. Dr. Vickey Saxena came over and met with patient and addressed murmur and glucose. Also made him aware of elevated creatinine. Informed him of appointment with pcp today at 1:45 and received order for echo prior to surgery. After patient spoke with Dr. Vickey Saxena she is considering delaying surgery until blood sugar is better controlled and to possibly get referral to endocrinology from pcp today while there.  Ochoa at Dr. Olivier Jarquin office called and left detailed voicemail regarding this with call back number [phone] to confirm VM was receive. Hibiclens and instructions to return bottle on DOS given per hospital policy. Nasal Swab collected per MD order and instructions for Mupirocin nasal ointment if required. Patient provided with handouts including Guide to Surgery, Pain Management, Hand Hygiene, Blood Transfusion Education, and [facility] Brochure. Patient answered medical/surgical history questions at their best of ability. All prior to admission medications documented in [facility]. Original medication prescription bottles visualized during patient appointment. Patient instructed to hold all vitamins 7 days prior to surgery and NSAIDS 5 days prior to surgery. Medications to be held: diclofenac, vitamins. Instructed to take/use 68 units of Levemir the night before surgery on 09/10/18. Patient instructed to continue previous medications as prescribed prior to surgery and to take the following medications the day of surgery according to anesthesia guidelines with a small sip of water: 81 mg aspirin, cartia. Instructed to bring: c-pap, metformin er, and [employer]. Patient teach back successful and patient demonstrates knowledge of instruction.

## 2018-08-27 NOTE — PERIOP NOTES
Katlyn Lowery NP    Your patient recently had labs drawn during a hospital appointment due to an upcoming surgery. The results are attached. If you have any questions or concerns please reach out to your patient for a follow-up in your office. Please do not respond to this message as my mailbox is not monitored. You may call [phone] with questions or concerns. Urine culture ordered. Please note glucose of 459. Thanks!     Recent Results (from the past 12 hour(s))   CBC W/O DIFF    Collection Time: 08/27/18  8:00 AM   Result Value Ref Range    WBC 10.5 4.3 - 11.1 K/uL    RBC 4.21 4.05 - 5.2 M/uL    HGB 11.5 (L) 11.7 - 15.4 g/dL    HCT 35.3 (L) 35.8 - 46.3 %    MCV 83.8 79.6 - 97.8 FL    MCH 27.3 26.1 - 32.9 PG    MCHC 32.6 31.4 - 35.0 g/dL    RDW 12.4 %    PLATELET 298 (H) 647 - 450 K/uL    MPV 9.8 9.4 - 12.3 FL    ABSOLUTE NRBC 0.00 0.0 - 0.2 K/uL   METABOLIC PANEL, BASIC    Collection Time: 08/27/18  8:00 AM   Result Value Ref Range    Sodium 133 (L) 136 - 145 mmol/L    Potassium 4.5 3.5 - 5.1 mmol/L    Chloride 98 98 - 107 mmol/L    CO2 24 21 - 32 mmol/L    Anion gap 11 7 - 16 mmol/L    Glucose 459 (HH) 65 - 100 mg/dL    BUN 35 (H) 8 - 23 MG/DL    Creatinine 1.53 (H) 0.6 - 1.0 MG/DL    GFR est AA 44 (L) >60 ml/min/1.73m2    GFR est non-AA 36 (L) >60 ml/min/1.73m2    Calcium 9.3 8.3 - 10.4 MG/DL   PROTHROMBIN TIME + INR    Collection Time: 08/27/18  8:00 AM   Result Value Ref Range    Prothrombin time 12.3 11.5 - 14.5 sec    INR 0.9     PTT    Collection Time: 08/27/18  8:00 AM   Result Value Ref Range    aPTT 28.3 23.2 - 35.3 SEC   URINALYSIS W/ RFLX MICROSCOPIC    Collection Time: 08/27/18  8:00 AM   Result Value Ref Range    Color YELLOW      Appearance CLOUDY      Specific gravity 1.021 1.001 - 1.023      pH (UA) 5.0 5.0 - 9.0      Protein NEGATIVE  NEG mg/dL    Glucose >1000 mg/dL    Ketone NEGATIVE  NEG mg/dL    Bilirubin NEGATIVE  NEG      Blood NEGATIVE  NEG      Urobilinogen 0.2 0.2 - 1.0 EU/dL Nitrites POSITIVE (A) NEG      Leukocyte Esterase MODERATE (A) NEG      WBC 20-50 0 /hpf    RBC 0-3 0 /hpf    Epithelial cells 0-3 0 /hpf    Bacteria 2+ (H) 0 /hpf    Mucus 1+ (H) 0 /lpf

## 2018-08-27 NOTE — PERIOP NOTES
Hilton Bartlett NP:      Patient: Shayy Real, [address] 09/11/18    During a recent visit to the surgical preadmission testing center, the above mentioned patient was found to have a non-fasting blood glucose level of 459 mg/dL. This may indicate inadequate diabetic management and raises concerns that the patient is not medically optimized for surgery. It is our standard practice to postpone elective surgery for patients who present fasting blood glucose level >300 mg/dL on the day of their procedure. The patient has been advised of this policy and counseled on the importance of glucose control. We feel that this patient is at increased risk of cancellation; however, their blood glucose may be in acceptable range when they are NPO. Therefore, we will leave the decision to you whether to delay the surgery and refer the patient to their primary care provider or keep them as currently scheduled. Our goal is to prevent as many delays and cancellations as possible while ensuring patient safety.     Sincerely,    [facility]

## 2018-08-29 VITALS
BODY MASS INDEX: 40.31 KG/M2 | WEIGHT: 227.5 LBS | SYSTOLIC BLOOD PRESSURE: 134 MMHG | DIASTOLIC BLOOD PRESSURE: 66 MMHG | HEIGHT: 63 IN | HEART RATE: 69 BPM | TEMPERATURE: 96.5 F | OXYGEN SATURATION: 98 %

## 2018-08-29 LAB
BACTERIA SPEC CULT: ABNORMAL
SERVICE CMNT-IMP: ABNORMAL

## 2018-08-29 NOTE — PROGRESS NOTES
08/27/18 0730   Oxygen Therapy   O2 Sat (%) 95 %   Pulse via Oximetry 73 beats per minute   O2 Device Room air   Pre-Treatment   Breath Sounds Bilateral Clear   Pre FEV1 (liters) 2 liters   % Predicted 85   Incentive Spirometry Treatment   Actual Volume (ml) 2000 ml     Initial respiratory Assessment completed with pt. Pt was interviewed and evaluated in [facility] prior to surgery. Patient ID:  Katelin David  [id number]  86 y.o.  1950  Surgeon: Dr. Danny Alcala  Date of Surgery: 9/11/2018  Procedure: Total Left Knee Arthroplasty  Primary Care Physician: Karely Mora -[phone]  Specialists:                                  Pt instructed in the use of Incentive Spirometry. Pt instructed to bring Incentive Spirometer back on date of surgery & to start using Is upon return to pt room. Pt taught proper cough technique    History of smoking:   NONE                                                       Quit date:           Secondhand smoke:FATHER      Past procedures with Oxygen desaturation:DIFFICULT INTUBATION    Past Medical History:   Diagnosis Date    Breast cancer ([facility] [address] 2007    right breast    DJD (degenerative joint disease) 04/30/2013    Bilateral knees    Essential hypertension, benign 04/30/2013    on med for control     Generalized osteoarthrosis, involving multiple sites 4/30/2013    Hand eczema 4/30/2013    Heart murmur     usure of when she was diagnosed; denies echo    Mixed hyperlipidemia 04/30/2013    on med    Morbid obesity ([address] 04/30/2013    BMI=40.3    Routine general medical examination at a health care facility 4/30/2013    Type II or unspecified type diabetes mellitus without mention of complication, not stated as uncontrolled 04/30/2013    oral and insulin meds; checks bs bid; average bs 300; last A1C=11.0 on 06/06/18    Unspecified sleep apnea 04/30/2013    c-pap; instructed to bring dos. Respiratory history:HX OF PNA                                 SOB  ON EXERTION                                    Respiratory meds:                                         FAMILY PRESENT:                                                       NO                                        PAST SLEEP STUDY:        YES                  8/10/11  HX OF [place]:                        YES                                                    [place] assessment:       RLS                                                 PHYSICAL EXAM   Body mass index is 40.3 kg/(m^2).    Visit Vitals    /66 (BP 1 Location: Right arm, BP Patient Position: At rest;Sitting)    Pulse 69    Temp 96.5 °F (35.8 °C)    Ht 5' 3\" (1.6 m)    Wt 103.2 kg (227 lb 8 oz)    SpO2 98%    BMI 40.3 kg/m2     Neck circumference:   46   cm    Loud snoring:        YES                             Witnessed apnea or wakening gasping or choking:,                                                                                                            APNEA    Awakens with headaches:                                                  DENIES    Morning or daytime tiredness/ sleepiness:                                                                                                          TIRED   Dry mouth or sore throat in morning:                YES                                                                            Mobley stage:  4    SACS score:58    STOP/BAN                              CPAP:                                                          HOME CPAP              Referrals:    Pt. Phone Number:

## 2018-09-18 ENCOUNTER — HOSPITAL ENCOUNTER (OUTPATIENT)
Dept: NON INVASIVE DIAGNOSTICS | Age: 68
Discharge: HOME | End: 2018-09-18
Attending: ANESTHESIOLOGY
Payer: MEDICARE

## 2018-09-18 DIAGNOSIS — R01.1 HEART MURMUR: ICD-10-CM

## 2018-09-18 PROBLEM — E11.21 TYPE 2 DIABETES WITH NEPHROPATHY (HCC): Status: ACTIVE | Noted: 2018-09-18

## 2018-09-18 PROCEDURE — 93306 TTE W/DOPPLER COMPLETE: CPT

## 2018-09-21 ENCOUNTER — HOSPITAL ENCOUNTER (OUTPATIENT)
Dept: LAB | Age: 68
Discharge: HOME | End: 2018-09-21
Payer: MEDICARE

## 2018-09-21 LAB
ALBUMIN SERPL-MCNC: 3.4 G/DL (ref 3.2–4.6)
ALBUMIN/GLOB SERPL: 0.9 {RATIO}
ALP SERPL-CCNC: 80 U/L (ref 50–136)
ALT SERPL-CCNC: 32 U/L (ref 12–65)
ANION GAP SERPL CALC-SCNC: 11 MMOL/L
AST SERPL-CCNC: 20 U/L (ref 15–37)
BILIRUB SERPL-MCNC: 0.5 MG/DL (ref 0.2–1.1)
BUN SERPL-MCNC: 32 MG/DL (ref 8–23)
CALCIUM SERPL-MCNC: 9.1 MG/DL (ref 8.3–10.4)
CHLORIDE SERPL-SCNC: 104 MMOL/L (ref 98–107)
CO2 SERPL-SCNC: 22 MMOL/L (ref 21–32)
CREAT SERPL-MCNC: 1.4 MG/DL (ref 0.6–1)
GLOBULIN SER CALC-MCNC: 4 G/DL
GLUCOSE SERPL-MCNC: 256 MG/DL (ref 65–100)
POTASSIUM SERPL-SCNC: 4.8 MMOL/L (ref 3.5–5.1)
PROT SERPL-MCNC: 7.4 G/DL (ref 6.3–8.2)
SODIUM SERPL-SCNC: 137 MMOL/L (ref 136–145)

## 2018-09-21 PROCEDURE — 36415 COLL VENOUS BLD VENIPUNCTURE: CPT

## 2018-09-21 PROCEDURE — 80053 COMPREHEN METABOLIC PANEL: CPT

## 2018-09-21 NOTE — PROGRESS NOTES
Please notify pt that her electrolytes on her repeat lab were normal and previous abnormalities were likely not clinically significant. Thanks for having labs done. Glucose is much improved. Pt should now be trying to use correction scale insulin. We will continue to monitor her kidney function, good glucose and BP control will help to protect her kidneys.

## 2018-10-29 ENCOUNTER — HOSPITAL ENCOUNTER (OUTPATIENT)
Dept: DIABETES SERVICES | Age: 68
Discharge: HOME | End: 2018-10-29
Payer: MEDICARE

## 2018-10-29 PROCEDURE — G0108 DIAB MANAGE TRN  PER INDIV: HCPCS

## 2018-10-29 NOTE — PROGRESS NOTES
Came for diabetes educational assessment today. Provided basic information on carbohydrates, proteins and fats. Educational need/plan: Will attend 2 nutrition/2 diabetes sessions to address the following: diabetes disease process, nutritional management, physical activity, using medications, preventing complications, psychosocial adjustment, goal setting, problem solving, monitoring, behavior change strategies. Patient admits to having kidney issues during appointment, but no noted dietary restrictions per patient. Stage 3 CKD in chart and last K+ 4.8. She cannot exercise due to needing both knees replaced. She reports told in 1950's to not have knees replaced too early. Now she reports they are telling her she waited too long. Seeing Vishnu Cid for assist with blood sugar control and patient reports improvement. Denies symptoms with elevations but does have symptoms with low blood sugars.  Sneha Haskins with her.

## 2018-10-30 ENCOUNTER — HOSPITAL ENCOUNTER (OUTPATIENT)
Dept: DIABETES SERVICES | Age: 68
Discharge: HOME | End: 2018-10-30
Payer: MEDICARE

## 2018-10-30 PROCEDURE — G0109 DIAB MANAGE TRN IND/GROUP: HCPCS

## 2018-10-30 NOTE — PROGRESS NOTES
Participant attended Diabetes #1 session today. Topics included: Characteristics/pathophysiology type 1/type 2 diabetes; Goal/acceptable blood glucose ranges/Hgb A1C/interpreting/using results;meters, continuous glucose monitors and insulin pumps. Using medications safely; Sick day management; Prevention/detection/treatment of acute complications. - Verbalized understanding of material covered.   -Goal for next session Diabetes Two  -Anticipated adherence is good   -Problems/barriers  none anticipated

## 2018-11-13 ENCOUNTER — HOSPITAL ENCOUNTER (OUTPATIENT)
Dept: DIABETES SERVICES | Age: 68
Discharge: HOME | End: 2018-11-13
Attending: PHYSICIAN ASSISTANT
Payer: MEDICARE

## 2018-11-13 PROCEDURE — G0109 DIAB MANAGE TRN IND/GROUP: HCPCS

## 2018-11-13 NOTE — PROGRESS NOTES
Participant attended Diabetes #2 session today. Topics included: Prevention/detection/treatment of chronic complications; sleep apnea; Developing strategies to promote health/change behavior/recommended screenings; Developing strategies to address psychosocial issues; Goal setting. Participants goal/support plan includes Medical Goal:  To control my blood sugars I will increase my upper body exercise three times a week starting tomorrow as long as it takes and reevaluate in two months. Medical Plan:   is my support and I will start the [employer] with him and he will help me.  . Problems/barriers may be: none anticipated Plan for follow up/Recommendations: mail follow up survey in 3 months

## 2018-11-19 ENCOUNTER — HOSPITAL ENCOUNTER (OUTPATIENT)
Dept: DIABETES SERVICES | Age: 68
Discharge: HOME | End: 2018-11-19
Attending: PHYSICIAN ASSISTANT
Payer: MEDICARE

## 2018-11-19 PROCEDURE — G0109 DIAB MANAGE TRN IND/GROUP: HCPCS

## 2018-11-28 ENCOUNTER — HOSPITAL ENCOUNTER (OUTPATIENT)
Dept: DIABETES SERVICES | Age: 68
Discharge: HOME | End: 2018-11-28
Attending: PHYSICIAN ASSISTANT
Payer: MEDICARE

## 2018-11-28 PROCEDURE — G0109 DIAB MANAGE TRN IND/GROUP: HCPCS

## 2018-11-28 NOTE — PROGRESS NOTES
Attended nutrition diabetes #2 group session today. Topics included: plate method for portion control; fiber and sodium guidelines; sugar substitutes; alcohol; eating out; recipe modification; label reading. Voiced/demonstrated understanding of material covered. Anticipated adherence is good. Pt's nutrition goal: To lower her blood sugars and get her knees done, she will eat more chicken and turkey rather than red meats at home and out and re-evaluate in 3 months. Pt's support plan: Use the grocery handout and other handouts from class and the websites given in class. Problems/barriers: Can't exercise right now. Recommend check pre-meal blood glucose and 2 hour post-prandial blood glucose to see how food choices affect blood glucose. Plan for follow up is: will be sent a follow up questionnaire in 3, 6 and 12 months. June 21, 2018      Timmy Montemayor MD  [address] [facility] [address] [facility]  2nd Floor  [place] [address]           [facility]  [address] [facility] [address] [place] [address]  Phone: [phone]          Patient: Stephani Willoughby   MR Number: [id number]   YOB: 1955   Date of Visit: 6/21/2018       Dear Dr. Timmy Montemayor:    Thank you for referring Stephani Willoughby to me for evaluation. Attached you will find relevant portions of my assessment and plan of care.    If you have questions, please do not hesitate to call me. I look forward to following Stephani Willoughby along with you.    Sincerely,    Samuel Cote MD    Enclosure  CC:  No Recipients    If you would like to receive this communication electronically, please contact [email or web address] or [phone] to request more information on [facility] access.    For providers and/or their staff who would like to refer a patient to [facility], please contact us through our one-stop-shop provider referral line, [facility], at [phone].    If you feel you have received this communication in error or would no longer like to receive these types of communications, please e-mail [email or web address]

## 2019-01-31 ENCOUNTER — HOSPITAL ENCOUNTER (OUTPATIENT)
Dept: DIABETES SERVICES | Age: 69
Discharge: HOME | End: 2019-01-31
Payer: MEDICARE

## 2019-01-31 PROCEDURE — G0109 DIAB MANAGE TRN IND/GROUP: HCPCS

## 2019-01-31 NOTE — PROGRESS NOTES
Attended diabetes follow up group class. Open forum for clients to ask questions based on entire diabetes self management education program. Education topics are driven in this class based on clients' individual questions and needs. Topics included: fats, proteins, cholesterol, sugar substitutes, portion control, meal planning, label reading, eating out, weight loss, exercise, Hgb A1C, medications, stress, signs/symptoms of high and low blood sugars and blood sugar goals.

## 2019-03-19 PROBLEM — R73.09 ELEVATED HEMOGLOBIN A1C: Status: RESOLVED | Noted: 2018-08-27 | Resolved: 2019-03-19

## 2019-05-15 ENCOUNTER — HOSPITAL ENCOUNTER (OUTPATIENT)
Dept: SURGERY | Age: 69
Discharge: HOME | End: 2019-05-15
Attending: ORTHOPAEDIC SURGERY
Payer: MEDICARE

## 2019-05-15 VITALS
OXYGEN SATURATION: 95 % | HEIGHT: 64 IN | DIASTOLIC BLOOD PRESSURE: 72 MMHG | SYSTOLIC BLOOD PRESSURE: 145 MMHG | BODY MASS INDEX: 40.12 KG/M2 | TEMPERATURE: 99 F | WEIGHT: 235 LBS | HEART RATE: 94 BPM

## 2019-05-15 LAB
ANION GAP SERPL CALC-SCNC: 8 MMOL/L (ref 7–16)
APPEARANCE UR: CLEAR
APTT PPP: 31.8 SEC (ref 24.7–39.8)
BACTERIA SPEC CULT: NORMAL
BACTERIA URNS QL MICRO: ABNORMAL /HPF
BASOPHILS # BLD: 0.1 K/UL (ref 0–0.2)
BASOPHILS NFR BLD: 0 % (ref 0–2)
BILIRUB UR QL: NEGATIVE
BUN SERPL-MCNC: 26 MG/DL (ref 8–23)
CALCIUM SERPL-MCNC: 9.1 MG/DL (ref 8.3–10.4)
CASTS URNS QL MICRO: ABNORMAL /LPF
CHLORIDE SERPL-SCNC: 105 MMOL/L (ref 98–107)
CO2 SERPL-SCNC: 24 MMOL/L (ref 21–32)
COLOR UR: YELLOW
CREAT SERPL-MCNC: 1.19 MG/DL (ref 0.6–1)
DIFFERENTIAL METHOD BLD: ABNORMAL
EOSINOPHIL # BLD: 0 K/UL (ref 0–0.8)
EOSINOPHIL NFR BLD: 0 % (ref 0.5–7.8)
EPI CELLS #/AREA URNS HPF: ABNORMAL /HPF
ERYTHROCYTE [DISTWIDTH] IN BLOOD BY AUTOMATED COUNT: 13.5 % (ref 11.9–14.6)
GLUCOSE SERPL-MCNC: 146 MG/DL (ref 65–100)
GLUCOSE UR STRIP.AUTO-MCNC: NEGATIVE MG/DL
HCT VFR BLD AUTO: 35.6 % (ref 35.8–46.3)
HGB BLD-MCNC: 11.4 G/DL (ref 11.7–15.4)
HGB UR QL STRIP: NEGATIVE
IMM GRANULOCYTES # BLD AUTO: 0.1 K/UL (ref 0–0.5)
IMM GRANULOCYTES NFR BLD AUTO: 1 % (ref 0–5)
INR PPP: 1
KETONES UR QL STRIP.AUTO: ABNORMAL MG/DL
LEUKOCYTE ESTERASE UR QL STRIP.AUTO: ABNORMAL
LYMPHOCYTES # BLD: 1.2 K/UL (ref 0.5–4.6)
LYMPHOCYTES NFR BLD: 11 % (ref 13–44)
MCH RBC QN AUTO: 27.5 PG (ref 26.1–32.9)
MCHC RBC AUTO-ENTMCNC: 32 G/DL (ref 31.4–35)
MCV RBC AUTO: 85.8 FL (ref 79.6–97.8)
MONOCYTES # BLD: 1.1 K/UL (ref 0.1–1.3)
MONOCYTES NFR BLD: 10 % (ref 4–12)
NEUTS SEG # BLD: 8.9 K/UL (ref 1.7–8.2)
NEUTS SEG NFR BLD: 79 % (ref 43–78)
NITRITE UR QL STRIP.AUTO: POSITIVE
NRBC # BLD: 0 K/UL (ref 0–0.2)
PH UR STRIP: 5 [PH] (ref 5–9)
PLATELET # BLD AUTO: 414 K/UL (ref 150–450)
PMV BLD AUTO: 8.9 FL (ref 9.4–12.3)
POTASSIUM SERPL-SCNC: 4.2 MMOL/L (ref 3.5–5.1)
PROT UR STRIP-MCNC: NEGATIVE MG/DL
PROTHROMBIN TIME: 13.5 SEC (ref 11.7–14.5)
RBC # BLD AUTO: 4.15 M/UL (ref 4.05–5.2)
RBC #/AREA URNS HPF: ABNORMAL /HPF
SERVICE CMNT-IMP: NORMAL
SODIUM SERPL-SCNC: 137 MMOL/L (ref 136–145)
SP GR UR REFRACTOMETRY: 1.02 (ref 1–1.02)
UROBILINOGEN UR QL STRIP.AUTO: 0.2 EU/DL (ref 0.2–1)
WBC # BLD AUTO: 11.4 K/UL (ref 4.3–11.1)
WBC URNS QL MICRO: ABNORMAL /HPF

## 2019-05-15 PROCEDURE — 85610 PROTHROMBIN TIME: CPT

## 2019-05-15 PROCEDURE — 81001 URINALYSIS AUTO W/SCOPE: CPT

## 2019-05-15 PROCEDURE — 85025 COMPLETE CBC W/AUTO DIFF WBC: CPT

## 2019-05-15 PROCEDURE — 85730 THROMBOPLASTIN TIME PARTIAL: CPT

## 2019-05-15 PROCEDURE — 87088 URINE BACTERIA CULTURE: CPT

## 2019-05-15 PROCEDURE — 87086 URINE CULTURE/COLONY COUNT: CPT

## 2019-05-15 PROCEDURE — 87186 SC STD MICRODIL/AGAR DIL: CPT

## 2019-05-15 PROCEDURE — 87641 MR-STAPH DNA AMP PROBE: CPT

## 2019-05-15 PROCEDURE — 80048 BASIC METABOLIC PNL TOTAL CA: CPT

## 2019-05-15 NOTE — PERIOP NOTES
Your patient recently had labs drawn during a hospital appointment due to an upcoming surgery. The results are attached. If you have any questions or concerns please reach out to your patient for a follow-up in your office. Please do not respond to this message as my mailbox is not monitored. You may call [phone] with questions or concerns. Recent Results (from the past 12 hour(s))   URINALYSIS W/ RFLX MICROSCOPIC    Collection Time: 05/15/19  8:37 AM   Result Value Ref Range    Color YELLOW      Appearance CLEAR      Specific gravity 1.016 1.001 - 1.023      pH (UA) 5.0 5.0 - 9.0      Protein NEGATIVE  NEG mg/dL    Glucose NEGATIVE  mg/dL    Ketone TRACE (A) NEG mg/dL    Bilirubin NEGATIVE  NEG      Blood NEGATIVE  NEG      Urobilinogen 0.2 0.2 - 1.0 EU/dL    Nitrites POSITIVE (A) NEG      Leukocyte Esterase SMALL (A) NEG      WBC 20-50 0 /hpf    RBC 0-3 0 /hpf    Epithelial cells 0-3 0 /hpf    Bacteria 3+ (H) 0 /hpf    Casts 0-3 0 /lpf   CBC WITH AUTOMATED DIFF    Collection Time: 05/15/19  9:05 AM   Result Value Ref Range    WBC 11.4 (H) 4.3 - 11.1 K/uL    RBC 4.15 4.05 - 5.2 M/uL    HGB 11.4 (L) 11.7 - 15.4 g/dL    HCT 35.6 (L) 35.8 - 46.3 %    MCV 85.8 79.6 - 97.8 FL    MCH 27.5 26.1 - 32.9 PG    MCHC 32.0 31.4 - 35.0 g/dL    RDW 13.5 11.9 - 14.6 %    PLATELET 452 401 - 870 K/uL    MPV 8.9 (L) 9.4 - 12.3 FL    ABSOLUTE NRBC 0.00 0.0 - 0.2 K/uL    DF AUTOMATED      NEUTROPHILS 79 (H) 43 - 78 %    LYMPHOCYTES 11 (L) 13 - 44 %    MONOCYTES 10 4.0 - 12.0 %    EOSINOPHILS 0 (L) 0.5 - 7.8 %    BASOPHILS 0 0.0 - 2.0 %    IMMATURE GRANULOCYTES 1 0.0 - 5.0 %    ABS. NEUTROPHILS 8.9 (H) 1.7 - 8.2 K/UL    ABS. LYMPHOCYTES 1.2 0.5 - 4.6 K/UL    ABS. MONOCYTES 1.1 0.1 - 1.3 K/UL    ABS. EOSINOPHILS 0.0 0.0 - 0.8 K/UL    ABS. BASOPHILS 0.1 0.0 - 0.2 K/UL    ABS. IMM.  GRANS. 0.1 0.0 - 0.5 K/UL   METABOLIC PANEL, BASIC    Collection Time: 05/15/19  9:05 AM   Result Value Ref Range    Sodium 137 136 - 145 mmol/L Potassium 4.2 3.5 - 5.1 mmol/L    Chloride 105 98 - 107 mmol/L    CO2 24 21 - 32 mmol/L    Anion gap 8 7 - 16 mmol/L    Glucose 146 (H) 65 - 100 mg/dL    BUN 26 (H) 8 - 23 MG/DL    Creatinine 1.19 (H) 0.6 - 1.0 MG/DL    GFR est AA 58 (L) >60 ml/min/1.73m2    GFR est non-AA 48 (L) >60 ml/min/1.73m2    Calcium 9.1 8.3 - 10.4 MG/DL   PROTHROMBIN TIME + INR    Collection Time: 05/15/19  9:05 AM   Result Value Ref Range    Prothrombin time 13.5 11.7 - 14.5 sec    INR 1.0     PTT    Collection Time: 05/15/19  9:05 AM   Result Value Ref Range    aPTT 31.8 24.7 - 39.8 SEC

## 2019-05-15 NOTE — PERIOP NOTES
Labs done today within anesthesia protocols. [facility] NP notified of abnormal UA and urine culture ordered. Opal to f/u. Recent Results (from the past 12 hour(s))   URINALYSIS W/ RFLX MICROSCOPIC    Collection Time: 05/15/19  8:37 AM   Result Value Ref Range    Color YELLOW      Appearance CLEAR      Specific gravity 1.016 1.001 - 1.023      pH (UA) 5.0 5.0 - 9.0      Protein NEGATIVE  NEG mg/dL    Glucose NEGATIVE  mg/dL    Ketone TRACE (A) NEG mg/dL    Bilirubin NEGATIVE  NEG      Blood NEGATIVE  NEG      Urobilinogen 0.2 0.2 - 1.0 EU/dL    Nitrites POSITIVE (A) NEG      Leukocyte Esterase SMALL (A) NEG      WBC 20-50 0 /hpf    RBC 0-3 0 /hpf    Epithelial cells 0-3 0 /hpf    Bacteria 3+ (H) 0 /hpf    Casts 0-3 0 /lpf   CBC WITH AUTOMATED DIFF    Collection Time: 05/15/19  9:05 AM   Result Value Ref Range    WBC 11.4 (H) 4.3 - 11.1 K/uL    RBC 4.15 4.05 - 5.2 M/uL    HGB 11.4 (L) 11.7 - 15.4 g/dL    HCT 35.6 (L) 35.8 - 46.3 %    MCV 85.8 79.6 - 97.8 FL    MCH 27.5 26.1 - 32.9 PG    MCHC 32.0 31.4 - 35.0 g/dL    RDW 13.5 11.9 - 14.6 %    PLATELET 902 521 - 365 K/uL    MPV 8.9 (L) 9.4 - 12.3 FL    ABSOLUTE NRBC 0.00 0.0 - 0.2 K/uL    DF AUTOMATED      NEUTROPHILS 79 (H) 43 - 78 %    LYMPHOCYTES 11 (L) 13 - 44 %    MONOCYTES 10 4.0 - 12.0 %    EOSINOPHILS 0 (L) 0.5 - 7.8 %    BASOPHILS 0 0.0 - 2.0 %    IMMATURE GRANULOCYTES 1 0.0 - 5.0 %    ABS. NEUTROPHILS 8.9 (H) 1.7 - 8.2 K/UL    ABS. LYMPHOCYTES 1.2 0.5 - 4.6 K/UL    ABS. MONOCYTES 1.1 0.1 - 1.3 K/UL    ABS. EOSINOPHILS 0.0 0.0 - 0.8 K/UL    ABS. BASOPHILS 0.1 0.0 - 0.2 K/UL    ABS. IMM.  GRANS. 0.1 0.0 - 0.5 K/UL   METABOLIC PANEL, BASIC    Collection Time: 05/15/19  9:05 AM   Result Value Ref Range    Sodium 137 136 - 145 mmol/L    Potassium 4.2 3.5 - 5.1 mmol/L    Chloride 105 98 - 107 mmol/L    CO2 24 21 - 32 mmol/L    Anion gap 8 7 - 16 mmol/L    Glucose 146 (H) 65 - 100 mg/dL    BUN 26 (H) 8 - 23 MG/DL    Creatinine 1.19 (H) 0.6 - 1.0 MG/DL    GFR est AA 58 (L) >60 ml/min/1.73m2    GFR est non-AA 48 (L) >60 ml/min/1.73m2    Calcium 9.1 8.3 - 10.4 MG/DL   PROTHROMBIN TIME + INR    Collection Time: 05/15/19  9:05 AM   Result Value Ref Range    Prothrombin time 13.5 11.7 - 14.5 sec    INR 1.0     PTT    Collection Time: 05/15/19  9:05 AM   Result Value Ref Range    aPTT 31.8 24.7 - 39.8 SEC

## 2019-05-15 NOTE — PERIOP NOTES
Patient verified name and . Order for consent is found in EHR and matches case posting; patient verified. Type 3 surgery, Walk in assessment complete. Labs per surgeon: cbc,bmp,pt,ptt,ua,mrsa swab ; results (processing)  Labs per anesthesia protocol: included in surgeon's orders  EKG:in emr dated (18) along with echo (18) - both within anesthesia protocols. MRSA/MSSA swab collected; pharmacy to review and dose antibiotic as appropriate. Hibiclens and instructions to return bottle on DOS given per hospital policy. Patient provided with handouts including Guide to Surgery, Pain Management, Hand Hygiene, Blood Transfusion Education, and [facility] Brochure. Patient answered medical/surgical history questions at their best of ability. All prior to admission medications documented in [facility]. Original medication prescription bottle not visualized during patient appointment. Patient instructed to hold all vitamins 7 days prior to surgery and NSAIDS 5 days prior to surgery. Prescription medications to hold: diclofenac oral and gel    Patient instructed to continue previous medications as prescribed prior to surgery and to take the following medications the day of surgery according to anesthesia guidelines with a small sip of water: ASA 81mg, diltiazem. Will use insulin NPH 80% or 28 units day before surgery (both AM and PM dose) and 1/2 dose or 17 units AM of surgery if glucose > 120, if glucose less than 120 - will hold AM dose. Given direct # to preop to call for further questions/instructions DOS of needed. NO Regular insulin AM DOS. Patient teach back successful and patient demonstrates knowledge of instruction.

## 2019-05-16 NOTE — H&P
H&P    Patient ID:  Gwendolyn Maurice  [id number]  83 y.o.  1950  Surgeon:  Claudius Litten, MD  Date of Surgery: * No surgery date entered *  Procedure: Left Knee Total Arthroplasty  Primary Care Physician: Mack Ware NP        Subjective:  Gwendolyn Maurice is a 79 y.o. WHITE [place]  female who presents with Left Knee pain. She has history of Left Knee pain for several months. Symptoms worse with walking long distances and relieved with rest. Conservative treatment consisting of  activity modification and injections have not helped. The patient lives with their family. The patients goal after surgery is improved pain and function. Past Medical History:   Diagnosis Date    Breast cancer ([facility] [address] 2007    right breast    DJD (degenerative joint disease) 04/30/2013    Bilateral knees    Essential hypertension, benign 04/30/2013    on med for control     Generalized osteoarthrosis, involving multiple sites 4/30/2013    Hand eczema 4/30/2013    Heart murmur     echo 9/18/18: estimated EF 55%    Mixed hyperlipidemia 04/30/2013    on med    Morbid obesity ([facility] [address] 04/30/2013    Routine general medical examination at a health care facility 4/30/2013    Type II or unspecified type diabetes mellitus without mention of complication, not stated as uncontrolled 04/30/2013    oral and insulin meds; checks bs 4 x day; average bs ranges (140-200); last A1C=7.4 on 12/18/18    Unspecified sleep apnea 04/30/2013    c-pap; instructed to bring dos.        Past Surgical History:   Procedure Laterality Date    BREAST SURGERY PROCEDURE UNLISTED  08/2010    let breast cyst aspirated    HX BREAST LUMPECTOMY Right 2007    cancer    HX CHOLECYSTECTOMY  1981    HX TONSILLECTOMY  ~1950    HX TUBAL LIGATION  1984     Family History   Problem Relation Age of Onset    Heart Disease Mother     Elevated Lipids Mother     Hypertension Mother     Diabetes Mother         type 2    Dementia Father 80    Heart Disease Father     Elevated Lipids Father     Hypertension Father     Diabetes Father         type 2    No Known Problems Sister     No Known Problems Brother     Breast Cancer Sister     Stroke Maternal Grandmother     No Known Problems Daughter     No Known Problems Son       Social History     Tobacco Use    Smoking status: Never Smoker    Smokeless tobacco: Never Used   Substance Use Topics    Alcohol use: No     Alcohol/week: 0.0 oz       Prior to Admission medications    Medication Sig Start Date End Date Taking? Authorizing Provider   lisinopril-hydroCHLOROthiazide (PRINZIDE, ZESTORETIC) 20-12.5 mg per tablet Take 2 Tabs by mouth daily. 3/25/19   Candy Miramontes NP   varicella-zoster recombinant, PF, (SHINGRIX, PF,) 50 mcg/0.5 mL susr injection 0.5mL by IntraMUSCular route once now and then repeat in 2-6 months 3/19/19   Candy Miramontes NP   diclofenac (VOLTAREN) 1 % gel Apply 4 g to affected area four (4) times daily. 3/19/19   Candy Miramontes NP   MAGNI-GUIDE misc Use with insulin, E11.65 1/31/19   Nury Dunlap PA-C   Insulin Syringe-Needle U-100 0.5 mL 31 gauge x 5/16\" syrg Use with insulin up to 5 times daily, E11.65 1/31/19   Nury Dunlap PA-C   metFORMIN ER (GLUCOPHAGE XR) 500 mg tablet Take two twice daily 1/21/19   Alicia GLYNN PA-C   glucose blood VI test strips (ONETOUCH VERIO) strip Check glucose 5 times daily, E11.65 12/18/18   Alicia GLYNN PA-C   dilTIAZem CD (CARDIZEM CD) 240 mg ER capsule Take 1 Cap by mouth daily. Patient taking differently: Take 240 mg by mouth daily. Take / use AM day of surgery  per anesthesia protocols. Indications: high blood pressure 12/12/18   Candy Miramontes NP   rosuvastatin (CRESTOR) 10 mg tablet Take 1 Tab by mouth nightly. 12/12/18   Candy Miramontes NP   diclofenac EC (VOLTAREN) 50 mg EC tablet Take 1 Tab by mouth two (2) times a day.  12/12/18   Candy Miramontes NP   insulin NPH (NOVOLIN N NPH U-100 INSULIN) 100 unit/mL injection 30 Units by SubCUTAneous route Before breakfast and dinner. Patient taking differently: 35 Units by SubCUTAneous route Before breakfast and dinner. 9/20/18   Christina Dotson PA-C   insulin regular (NOVOLIN R REGULAR U-100 INSULN) 100 unit/mL injection 15 units before meals three times daily plus correction 2/50>150, max daily dose 60 units  Patient taking differently: 28 units before meals three times daily plus correction 2/50>150, max daily dose 60 units 9/19/18   Carina Balderas PA-C   lancets (ONE TOUCH DELICA) 33 gauge misc Check glucose 4 times daily, E11.65 9/18/18   Renetta Balderas PA-C   aspirin delayed-release 81 mg tablet Take 81 mg by mouth daily. Take / use AM day of surgery  per anesthesia protocols. Provider, Historical   melatonin tab tablet Take 5 mg by mouth nightly. Provider, Historical     Allergies   Allergen Reactions    Ambien [Zolpidem] Palpitations     Ambien CR heart race. ambien-hypnotics hallucinations    Erythromycin Other (comments)     Increased heart rate     Norvasc [Amlodipine] Other (comments)     Leg swelling        REVIEW OF SYSTEMS:  CONSTITUTIONAL: Denies fever, decreased appetite, weight loss/gain, night sweats or fatigue. HEENT: Denies vision or hearing changes. denies glasses. denies hearing aids. CARDIAC: Denies CP, palpitations, rheumatic fever, murmur, peripheral edema, carotid artery disease or syncopal episodes. RESPIRATORY: Denies dyspnea on exertion, asthma, COPD or orthopnea. GI: Denies GERD, history of GI bleed or melena, PUD, hepatitis or cirrhosis. : Denies dysuria, hematuria. denies BPH symptoms. HEMATOLOGIC: Denies anemia or blood disorders. ENDOCRINE: Denies thyroid disease. MUSCULOSKELETAL: See HPI. NEUROLOGIC: Denies seizure, peripheral neuropathy or memory loss. PSYCH: Denies depression, anxiety or insomnia. SKIN: Denies rash or open sores. Objective:    PHYSICAL EXAM  GENERAL: No data found. EYES: PERRL.  EOM intact. MOUTH:Teeth and Gums normal. NECK: Full ROM. Trachea midline. No thyromegaly or JVD. CARDIOVASCULAR: Regular rate and rhythm. No murmur or gallops. No carotid bruits. Peripheral pulses: radial 2 +, PT 2+, DP 2+ bilaterally. LUNGS: CTA bilaterally. No wheezes, rhonchi or rales. GI: positive BS. Abdomen nontender. NEUROLOGIC: Alert and oriented x 3. Bilateral equal strong had grasp and bilateral equal strong plantar flexion and dorsiflexion. GAIT: abnormal MUSCULOSKELETAL: ROM: full with pain. Tenderness: over the medial and lateral joint lines. Crepitus: present. SKIN: No rash, bruising, swelling, redness or warmth. Labs:  No results found for this or any previous visit (from the past 24 hour(s)). Xray Left Knee: joint space narrowing    Assessment:  Advanced Left Knee Osteoarthritis. Total Left Knee Arthroplasty Indicated. Patient Active Problem List   Diagnosis Code    Hand eczema L30.9    DJD (degenerative joint disease) M19.90    Essential hypertension, benign I10    Mixed hyperlipidemia E78.2    Uncontrolled type 2 diabetes mellitus without complication, with long-term current use of insulin ([address] E11.65, Z79.4    Sleep apnea G47.30    Morbid obesity ([address] E66.01    Routine general medical examination at a health care facility Z00.00    Generalized osteoarthrosis, involving multiple sites M15.9    Breast cancer ([address] C50.919    Osteoarthritis of left knee M17.12    Elevated serum creatinine R79.89    Abnormal urinalysis R82.90    Type 2 diabetes with nephropathy ([address] E11.21       Plan:  I have advised the patient of the risks and consequences, including possible complications of performing total joint replacement, as well as not doing this operation. The patient had the opportunity to ask questions and have them answered to their satisfaction.      Signed:  ZURI Kaplan 5/16/2019

## 2019-05-16 NOTE — ADVANCED PRACTICE NURSE
Total Joint Surgery Preoperative Chart Review      Patient ID:  Mic Smith  [id number]  15 y.o.  1950  Surgeon: Dr. Ivonne Duffy  Date of Surgery: 5/23/2019  Procedure: Total Left Knee Arthroplasty  Primary Care Physician: Debra Santana [phone]  Specialty Physician(s):      Subjective:   Mic Smith is a 79 y.o. WHITE OR  female who presents for preoperative evaluation for Total Left Knee arthroplasty. This is a preoperative chart review note based on data collected by the nurse at the surgical Pre-Assessment visit. Past Medical History:   Diagnosis Date    Breast cancer ([facility] [address] 2007    right breast    DJD (degenerative joint disease) 04/30/2013    Bilateral knees    Essential hypertension, benign 04/30/2013    on med for control     Generalized osteoarthrosis, involving multiple sites 4/30/2013    Hand eczema 4/30/2013    Heart murmur     echo 9/18/18: estimated EF 55%    Mixed hyperlipidemia 04/30/2013    on med    Morbid obesity ([facility] [address] 04/30/2013    Routine general medical examination at a [facility] care facility 4/30/2013    Type II or unspecified type diabetes mellitus without mention of complication, not stated as uncontrolled 04/30/2013    oral and insulin meds; checks bs 4 x day; average bs ranges (140-200); last A1C=7.4 on 12/18/18    Unspecified sleep apnea 04/30/2013    c-pap; instructed to bring dos.        Past Surgical History:   Procedure Laterality Date    BREAST SURGERY PROCEDURE UNLISTED  08/2010    let breast cyst aspirated    HX BREAST LUMPECTOMY Right 2007    cancer    HX CHOLECYSTECTOMY  1981    HX TONSILLECTOMY  ~1950    HX TUBAL LIGATION  1984     Family History   Problem Relation Age of Onset    Heart Disease Mother     Elevated Lipids Mother     Hypertension Mother     Diabetes Mother         type 2    Dementia Father 80    Heart Disease Father     Elevated Lipids Father     Hypertension Father     Diabetes Father         type 2    No Known Problems Sister     No Known Problems Brother     Breast Cancer Sister     Stroke Maternal Grandmother     No Known Problems Daughter     No Known Problems Son       Social History     Tobacco Use    Smoking status: Never Smoker    Smokeless tobacco: Never Used   Substance Use Topics    Alcohol use: No     Alcohol/week: 0.0 oz       Prior to Admission medications    Medication Sig Start Date End Date Taking? Authorizing Provider   lisinopril-hydroCHLOROthiazide (PRINZIDE, ZESTORETIC) 20-12.5 mg per tablet Take 2 Tabs by mouth daily. 3/25/19  Yes Stephen Banegas NP   diclofenac (VOLTAREN) 1 % gel Apply 4 g to affected area four (4) times daily. 3/19/19  Yes Stephen Banegas NP   MAGNI-GUIDE misc Use with insulin, E11.65 1/31/19  Yes Brayn Sabillon   Insulin Syringe-Needle U-100 0.5 mL 31 gauge x 5/16\" syrg Use with insulin up to 5 times daily, E11.65 1/31/19  Yes Oskar Balderas PA-C   metFORMIN ER (GLUCOPHAGE XR) 500 mg tablet Take two twice daily 1/21/19  Yes Jean Claude Balderas Che, PA-C   glucose blood VI test strips (ONETOUCH VERIO) strip Check glucose 5 times daily, E11.65 12/18/18  Yes Jean Claude Balderas Che, PA-C   dilTIAZem CD (CARDIZEM CD) 240 mg ER capsule Take 1 Cap by mouth daily. Patient taking differently: Take 240 mg by mouth daily. Take / use AM day of surgery  per anesthesia protocols. Indications: high blood pressure 12/12/18  Yes Stephen Banegas NP   rosuvastatin (CRESTOR) 10 mg tablet Take 1 Tab by mouth nightly. 12/12/18  Yes Stephen Banegas NP   diclofenac EC (VOLTAREN) 50 mg EC tablet Take 1 Tab by mouth two (2) times a day. 12/12/18  Yes Stephen Banegas NP   insulin NPH (NOVOLIN N NPH U-100 INSULIN) 100 unit/mL injection 30 Units by SubCUTAneous route Before breakfast and dinner. Patient taking differently: 35 Units by SubCUTAneous route Before breakfast and dinner.  9/20/18  Yes Carina Balderas PA-C   insulin regular (NOVOLIN R REGULAR U-100 INSULN) 100 unit/mL injection 15 units before meals three times daily plus correction 2/50>150, max daily dose 60 units  Patient taking differently: 28 units before meals three times daily plus correction 2/50>150, max daily dose 60 units 9/19/18  Yes Carina Balderas PA-C   lancets (ONE TOUCH DELICA) 33 gauge misc Check glucose 4 times daily, E11.65 9/18/18  Yes Angle Balderas PA-C   aspirin delayed-release 81 mg tablet Take 81 mg by mouth daily. Take / use AM day of surgery  per anesthesia protocols. Yes Provider, Historical   melatonin tab tablet Take 5 mg by mouth nightly. Yes Provider, Historical   varicella-zoster recombinant, PF, (SHINGRIX, PF,) 50 mcg/0.5 mL susr injection 0.5mL by IntraMUSCular route once now and then repeat in 2-6 months 3/19/19   Elmarie Form, NP     Allergies   Allergen Reactions    Ambien [Zolpidem] Palpitations     Ambien CR heart race. ambien-hypnotics hallucinations    Erythromycin Other (comments)     Increased heart rate     Norvasc [Amlodipine] Other (comments)     Leg swelling          Objective:     Physical Exam:       ECG:    EKG Results     None          Data Review:   Labs:     Results for Monster Braun (MRN [id number]) as of 5/16/2019 11:21   Ref. Range 5/15/2019 09:05   WBC Latest Ref Range: 4.3 - 11.1 K/uL 11.4 (H)   RBC Latest Ref Range: 4.05 - 5.2 M/uL 4.15   HGB Latest Ref Range: 11.7 - 15.4 g/dL 11.4 (L)   HCT Latest Ref Range: 35.8 - 46.3 % 35.6 (L)   MCV Latest Ref Range: 79.6 - 97.8 FL 85.8   MCH Latest Ref Range: 26.1 - 32.9 PG 27.5   MCHC Latest Ref Range: 31.4 - 35.0 g/dL 32.0   RDW Latest Ref Range: 11.9 - 14.6 % 13.5   Results for Monster Braun (MRN [id number]) as of 5/16/2019 11:21   Ref.  Range 5/15/2019 09:05   Sodium Latest Ref Range: 136 - 145 mmol/L 137   Potassium Latest Ref Range: 3.5 - 5.1 mmol/L 4.2   Chloride Latest Ref Range: 98 - 107 mmol/L 105   CO2 Latest Ref Range: 21 - 32 mmol/L 24   Anion gap Latest Ref Range: 7 - 16 mmol/L 8   Glucose Latest Ref Range: 65 - 100 mg/dL 146 (H)   BUN Latest Ref Range: 8 - 23 MG/DL 26 (H)   Creatinine Latest Ref Range: 0.6 - 1.0 MG/DL 1.19 (H)   Calcium Latest Ref Range: 8.3 - 10.4 MG/DL 9.1   GFR est non-AA Latest Ref Range: >60 ml/min/1.73m2 48 (L)   GFR est AA Latest Ref Range: >60 ml/min/1.73m2 58 (L)   Results for Tacos Martinez (MRN [id number]) as of 5/16/2019 11:21   Ref. Range 12/18/2018 08:11   Hemoglobin A1c (POC) Latest Units: % 7.4   Problem List:  )  Patient Active Problem List   Diagnosis Code    Hand eczema L30.9    DJD (degenerative joint disease) M19.90    Essential hypertension, benign I10    Mixed hyperlipidemia E78.2    Uncontrolled type 2 diabetes mellitus without complication, with long-term current use of insulin ([address] E11.65, Z79.4    Sleep apnea G47.30    Morbid obesity ([address] E66.01    Routine general medical examination at a health care facility Z00.00    Generalized osteoarthrosis, involving multiple sites M15.9    Breast cancer ([address] C50.919    Osteoarthritis of left knee M17.12    Elevated serum creatinine R79.89    Abnormal urinalysis R82.90    Type 2 diabetes with nephropathy ([address] E11.21       Total Joint Surgery Pre-Assessment Recommendations:         Abnormal urinalysis. Culture pending. Will call in antibiotic if indicated. Patient is to wear home CPAP during hospitalization.        Signed By: BILLY Bradley    May 16, 2019

## 2019-05-17 NOTE — ADVANCED PRACTICE NURSE
Culture still pending. Called and spoke with patient. Will call in Macrobid 100 mg po bid x 10 days with zero refills to [facility] at [phone]. Will follow up with culture results when they become available.

## 2019-05-18 LAB
BACTERIA SPEC CULT: ABNORMAL
BACTERIA SPEC CULT: ABNORMAL
SERVICE CMNT-IMP: ABNORMAL

## 2019-05-22 ENCOUNTER — ANESTHESIA EVENT (OUTPATIENT)
Dept: SURGERY | Age: 69
DRG: 470 | End: 2019-05-22
Payer: MEDICARE

## 2019-05-23 ENCOUNTER — ANESTHESIA (OUTPATIENT)
Dept: SURGERY | Age: 69
DRG: 470 | End: 2019-05-23
Payer: MEDICARE

## 2019-05-23 ENCOUNTER — HOME HEALTH ADMISSION (OUTPATIENT)
Dept: HOME HEALTH SERVICES | Facility: HOME HEALTH | Age: 69
End: 2019-05-23
Payer: MEDICARE

## 2019-05-23 ENCOUNTER — HOSPITAL ENCOUNTER (INPATIENT)
Age: 69
LOS: 2 days | Discharge: HOME HEALTH | DRG: 470 | End: 2019-05-25
Attending: ORTHOPAEDIC SURGERY | Admitting: ORTHOPAEDIC SURGERY
Payer: MEDICARE

## 2019-05-23 DIAGNOSIS — Z96.652 TOTAL KNEE REPLACEMENT STATUS, LEFT: Primary | ICD-10-CM

## 2019-05-23 DIAGNOSIS — I10 ESSENTIAL HYPERTENSION, BENIGN: ICD-10-CM

## 2019-05-23 PROBLEM — M17.12 ARTHRITIS OF KNEE, LEFT: Status: ACTIVE | Noted: 2019-05-23

## 2019-05-23 PROBLEM — Z96.659 S/P TOTAL KNEE ARTHROPLASTY: Status: ACTIVE | Noted: 2019-05-23

## 2019-05-23 LAB
ABO + RH BLD: NORMAL
BLOOD GROUP ANTIBODIES SERPL: NORMAL
GLUCOSE BLD STRIP.AUTO-MCNC: 175 MG/DL (ref 65–100)
GLUCOSE BLD STRIP.AUTO-MCNC: 282 MG/DL (ref 65–100)
GLUCOSE BLD STRIP.AUTO-MCNC: 355 MG/DL (ref 65–100)
HGB BLD-MCNC: 10 G/DL (ref 11.7–15.4)
SPECIMEN EXP DATE BLD: NORMAL

## 2019-05-23 PROCEDURE — 77030008467 HC STPLR SKN COVD -B: Performed by: ORTHOPAEDIC SURGERY

## 2019-05-23 PROCEDURE — 77030007880 HC KT SPN EPDRL BBMI -B: Performed by: ANESTHESIOLOGY

## 2019-05-23 PROCEDURE — 74011250636 HC RX REV CODE- 250/636: Performed by: ANESTHESIOLOGY

## 2019-05-23 PROCEDURE — 77030018673: Performed by: ORTHOPAEDIC SURGERY

## 2019-05-23 PROCEDURE — 74011000258 HC RX REV CODE- 258: Performed by: ORTHOPAEDIC SURGERY

## 2019-05-23 PROCEDURE — 77030020256 HC SOL INJ NACL 0.9%  500ML: Performed by: ORTHOPAEDIC SURGERY

## 2019-05-23 PROCEDURE — 94760 N-INVAS EAR/PLS OXIMETRY 1: CPT

## 2019-05-23 PROCEDURE — 77030013819 HC MX SYS CEM ZIMM -B: Performed by: ORTHOPAEDIC SURGERY

## 2019-05-23 PROCEDURE — 74011000250 HC RX REV CODE- 250: Performed by: ANESTHESIOLOGY

## 2019-05-23 PROCEDURE — 77030033067 HC SUT PDO STRATFX SPIR J&J -B: Performed by: ORTHOPAEDIC SURGERY

## 2019-05-23 PROCEDURE — 77030006835 HC BLD SAW SAG STRY -B: Performed by: ORTHOPAEDIC SURGERY

## 2019-05-23 PROCEDURE — 74011000302 HC RX REV CODE- 302: Performed by: ORTHOPAEDIC SURGERY

## 2019-05-23 PROCEDURE — 77030006720 HC BLD PAT RMR ZIMM -B: Performed by: ORTHOPAEDIC SURGERY

## 2019-05-23 PROCEDURE — 77030036688 HC BLNKT CLD THER S2SG -B

## 2019-05-23 PROCEDURE — 76942 ECHO GUIDE FOR BIOPSY: CPT | Performed by: ORTHOPAEDIC SURGERY

## 2019-05-23 PROCEDURE — 74011000250 HC RX REV CODE- 250: Performed by: ORTHOPAEDIC SURGERY

## 2019-05-23 PROCEDURE — 85018 HEMOGLOBIN: CPT

## 2019-05-23 PROCEDURE — 74011636637 HC RX REV CODE- 636/637: Performed by: ORTHOPAEDIC SURGERY

## 2019-05-23 PROCEDURE — 77030003665 HC NDL SPN BBMI -A: Performed by: ANESTHESIOLOGY

## 2019-05-23 PROCEDURE — 76210000017 HC OR PH I REC 1.5 TO 2 HR: Performed by: ORTHOPAEDIC SURGERY

## 2019-05-23 PROCEDURE — 74011000250 HC RX REV CODE- 250

## 2019-05-23 PROCEDURE — 77030031139 HC SUT VCRL2 J&J -A: Performed by: ORTHOPAEDIC SURGERY

## 2019-05-23 PROCEDURE — 77030003602 HC NDL NRV BLK BBMI -B: Performed by: ANESTHESIOLOGY

## 2019-05-23 PROCEDURE — 82962 GLUCOSE BLOOD TEST: CPT

## 2019-05-23 PROCEDURE — 76010000162 HC OR TIME 1.5 TO 2 HR INTENSV-TIER 1: Performed by: ORTHOPAEDIC SURGERY

## 2019-05-23 PROCEDURE — 77030016544 HC BLD SAW RECIP1 STRY -B: Performed by: ORTHOPAEDIC SURGERY

## 2019-05-23 PROCEDURE — 77030013708 HC HNDPC SUC IRR PULS STRY –B: Performed by: ORTHOPAEDIC SURGERY

## 2019-05-23 PROCEDURE — 87086 URINE CULTURE/COLONY COUNT: CPT

## 2019-05-23 PROCEDURE — 77030020782 HC GWN BAIR PAWS FLX 3M -B: Performed by: ANESTHESIOLOGY

## 2019-05-23 PROCEDURE — 74011250637 HC RX REV CODE- 250/637: Performed by: ORTHOPAEDIC SURGERY

## 2019-05-23 PROCEDURE — 97161 PT EVAL LOW COMPLEX 20 MIN: CPT

## 2019-05-23 PROCEDURE — 86900 BLOOD TYPING SEROLOGIC ABO: CPT

## 2019-05-23 PROCEDURE — 77030012935 HC DRSG AQUACEL BMS -B: Performed by: ORTHOPAEDIC SURGERY

## 2019-05-23 PROCEDURE — 77030019557 HC ELECTRD VES SEAL MEDT -F: Performed by: ORTHOPAEDIC SURGERY

## 2019-05-23 PROCEDURE — C1776 JOINT DEVICE (IMPLANTABLE): HCPCS | Performed by: ORTHOPAEDIC SURGERY

## 2019-05-23 PROCEDURE — 77030020263 HC SOL INJ SOD CL0.9% LFCR 1000ML

## 2019-05-23 PROCEDURE — 0SRD0J9 REPLACEMENT OF LEFT KNEE JOINT WITH SYNTHETIC SUBSTITUTE, CEMENTED, OPEN APPROACH: ICD-10-PCS | Performed by: ORTHOPAEDIC SURGERY

## 2019-05-23 PROCEDURE — C1713 ANCHOR/SCREW BN/BN,TIS/BN: HCPCS | Performed by: ORTHOPAEDIC SURGERY

## 2019-05-23 PROCEDURE — 36415 COLL VENOUS BLD VENIPUNCTURE: CPT

## 2019-05-23 PROCEDURE — 97110 THERAPEUTIC EXERCISES: CPT

## 2019-05-23 PROCEDURE — 76010010054 HC POST OP PAIN BLOCK: Performed by: ORTHOPAEDIC SURGERY

## 2019-05-23 PROCEDURE — 65270000029 HC RM PRIVATE

## 2019-05-23 PROCEDURE — 77030034849: Performed by: ORTHOPAEDIC SURGERY

## 2019-05-23 PROCEDURE — 86580 TB INTRADERMAL TEST: CPT | Performed by: ORTHOPAEDIC SURGERY

## 2019-05-23 PROCEDURE — 74011250636 HC RX REV CODE- 250/636

## 2019-05-23 PROCEDURE — 74011250636 HC RX REV CODE- 250/636: Performed by: ORTHOPAEDIC SURGERY

## 2019-05-23 PROCEDURE — 97165 OT EVAL LOW COMPLEX 30 MIN: CPT

## 2019-05-23 PROCEDURE — 77010033678 HC OXYGEN DAILY

## 2019-05-23 PROCEDURE — 74011250637 HC RX REV CODE- 250/637: Performed by: ANESTHESIOLOGY

## 2019-05-23 PROCEDURE — 77030011208: Performed by: ORTHOPAEDIC SURGERY

## 2019-05-23 PROCEDURE — 77030018836 HC SOL IRR NACL ICUM -A: Performed by: ORTHOPAEDIC SURGERY

## 2019-05-23 PROCEDURE — 76060000035 HC ANESTHESIA 2 TO 2.5 HR: Performed by: ORTHOPAEDIC SURGERY

## 2019-05-23 DEVICE — (D)CEMENT BNE HV R 40GM -- DUPE USE ITEM 353850: Type: IMPLANTABLE DEVICE | Site: KNEE | Status: FUNCTIONAL

## 2019-05-23 DEVICE — BASEPLATE TIB SZ 5 FIX BEAR CEM S+ TECHNOLOGY ATTUNE: Type: IMPLANTABLE DEVICE | Site: KNEE | Status: FUNCTIONAL

## 2019-05-23 DEVICE — COMPONENT PAT DIA38MM POLYETH DOME CEM MEDIALIZED ATTUNE: Type: IMPLANTABLE DEVICE | Site: KNEE | Status: FUNCTIONAL

## 2019-05-23 DEVICE — COMPONENT FEM SZ 6 L KNEE NAR POST STBL CEM ATTUNE: Type: IMPLANTABLE DEVICE | Site: KNEE | Status: FUNCTIONAL

## 2019-05-23 DEVICE — IMPLANTABLE DEVICE: Type: IMPLANTABLE DEVICE | Site: KNEE | Status: FUNCTIONAL

## 2019-05-23 RX ORDER — SODIUM CHLORIDE, SODIUM LACTATE, POTASSIUM CHLORIDE, CALCIUM CHLORIDE 600; 310; 30; 20 MG/100ML; MG/100ML; MG/100ML; MG/100ML
100 INJECTION, SOLUTION INTRAVENOUS CONTINUOUS
Status: DISCONTINUED | OUTPATIENT
Start: 2019-05-23 | End: 2019-05-23 | Stop reason: HOSPADM

## 2019-05-23 RX ORDER — CELECOXIB 200 MG/1
200 CAPSULE ORAL ONCE
Status: COMPLETED | OUTPATIENT
Start: 2019-05-23 | End: 2019-05-23

## 2019-05-23 RX ORDER — MIDAZOLAM HYDROCHLORIDE 1 MG/ML
2 INJECTION, SOLUTION INTRAMUSCULAR; INTRAVENOUS
Status: COMPLETED | OUTPATIENT
Start: 2019-05-23 | End: 2019-05-23

## 2019-05-23 RX ORDER — DEXAMETHASONE SODIUM PHOSPHATE 4 MG/ML
INJECTION, SOLUTION INTRA-ARTICULAR; INTRALESIONAL; INTRAMUSCULAR; INTRAVENOUS; SOFT TISSUE AS NEEDED
Status: DISCONTINUED | OUTPATIENT
Start: 2019-05-23 | End: 2019-05-23 | Stop reason: HOSPADM

## 2019-05-23 RX ORDER — DIPHENHYDRAMINE HCL 25 MG
25 CAPSULE ORAL
Status: DISCONTINUED | OUTPATIENT
Start: 2019-05-23 | End: 2019-05-25 | Stop reason: HOSPADM

## 2019-05-23 RX ORDER — CEFAZOLIN SODIUM/WATER 2 G/20 ML
2 SYRINGE (ML) INTRAVENOUS EVERY 8 HOURS
Status: COMPLETED | OUTPATIENT
Start: 2019-05-23 | End: 2019-05-24

## 2019-05-23 RX ORDER — ROPIVACAINE HYDROCHLORIDE 2 MG/ML
INJECTION, SOLUTION EPIDURAL; INFILTRATION; PERINEURAL AS NEEDED
Status: DISCONTINUED | OUTPATIENT
Start: 2019-05-23 | End: 2019-05-23 | Stop reason: HOSPADM

## 2019-05-23 RX ORDER — SODIUM CHLORIDE 0.9 % (FLUSH) 0.9 %
5-40 SYRINGE (ML) INJECTION AS NEEDED
Status: DISCONTINUED | OUTPATIENT
Start: 2019-05-23 | End: 2019-05-25 | Stop reason: HOSPADM

## 2019-05-23 RX ORDER — METFORMIN HYDROCHLORIDE 500 MG/1
1000 TABLET ORAL 2 TIMES DAILY WITH MEALS
Status: DISCONTINUED | OUTPATIENT
Start: 2019-05-23 | End: 2019-05-24

## 2019-05-23 RX ORDER — TRANEXAMIC ACID 100 MG/ML
INJECTION, SOLUTION INTRAVENOUS AS NEEDED
Status: DISCONTINUED | OUTPATIENT
Start: 2019-05-23 | End: 2019-05-23 | Stop reason: HOSPADM

## 2019-05-23 RX ORDER — PROPOFOL 10 MG/ML
INJECTION, EMULSION INTRAVENOUS
Status: DISCONTINUED | OUTPATIENT
Start: 2019-05-23 | End: 2019-05-23 | Stop reason: HOSPADM

## 2019-05-23 RX ORDER — DEXAMETHASONE SODIUM PHOSPHATE 100 MG/10ML
10 INJECTION INTRAMUSCULAR; INTRAVENOUS ONCE
Status: ACTIVE | OUTPATIENT
Start: 2019-05-24 | End: 2019-05-25

## 2019-05-23 RX ORDER — NALOXONE HYDROCHLORIDE 0.4 MG/ML
.2-.4 INJECTION, SOLUTION INTRAMUSCULAR; INTRAVENOUS; SUBCUTANEOUS
Status: DISCONTINUED | OUTPATIENT
Start: 2019-05-23 | End: 2019-05-25 | Stop reason: HOSPADM

## 2019-05-23 RX ORDER — ONDANSETRON 2 MG/ML
INJECTION INTRAMUSCULAR; INTRAVENOUS AS NEEDED
Status: DISCONTINUED | OUTPATIENT
Start: 2019-05-23 | End: 2019-05-23 | Stop reason: HOSPADM

## 2019-05-23 RX ORDER — SODIUM CHLORIDE 9 MG/ML
100 INJECTION, SOLUTION INTRAVENOUS CONTINUOUS
Status: DISPENSED | OUTPATIENT
Start: 2019-05-23 | End: 2019-05-25

## 2019-05-23 RX ORDER — SODIUM CHLORIDE 0.9 % (FLUSH) 0.9 %
5-40 SYRINGE (ML) INJECTION EVERY 8 HOURS
Status: DISCONTINUED | OUTPATIENT
Start: 2019-05-23 | End: 2019-05-25 | Stop reason: HOSPADM

## 2019-05-23 RX ORDER — CEFAZOLIN SODIUM/WATER 2 G/20 ML
2 SYRINGE (ML) INTRAVENOUS ONCE
Status: COMPLETED | OUTPATIENT
Start: 2019-05-23 | End: 2019-05-23

## 2019-05-23 RX ORDER — DIPHENHYDRAMINE HYDROCHLORIDE 50 MG/ML
12.5 INJECTION, SOLUTION INTRAMUSCULAR; INTRAVENOUS
Status: DISCONTINUED | OUTPATIENT
Start: 2019-05-23 | End: 2019-05-23 | Stop reason: HOSPADM

## 2019-05-23 RX ORDER — NALOXONE HYDROCHLORIDE 0.4 MG/ML
0.1 INJECTION, SOLUTION INTRAMUSCULAR; INTRAVENOUS; SUBCUTANEOUS
Status: DISCONTINUED | OUTPATIENT
Start: 2019-05-23 | End: 2019-05-23 | Stop reason: HOSPADM

## 2019-05-23 RX ORDER — ROPIVACAINE HYDROCHLORIDE 2 MG/ML
INJECTION, SOLUTION EPIDURAL; INFILTRATION; PERINEURAL
Status: COMPLETED | OUTPATIENT
Start: 2019-05-23 | End: 2019-05-23

## 2019-05-23 RX ORDER — HYDROMORPHONE HYDROCHLORIDE 2 MG/ML
0.5 INJECTION, SOLUTION INTRAMUSCULAR; INTRAVENOUS; SUBCUTANEOUS
Status: DISCONTINUED | OUTPATIENT
Start: 2019-05-23 | End: 2019-05-23 | Stop reason: HOSPADM

## 2019-05-23 RX ORDER — OXYCODONE HYDROCHLORIDE 5 MG/1
5-10 TABLET ORAL
Status: DISCONTINUED | OUTPATIENT
Start: 2019-05-23 | End: 2019-05-25 | Stop reason: HOSPADM

## 2019-05-23 RX ORDER — LISINOPRIL AND HYDROCHLOROTHIAZIDE 12.5; 2 MG/1; MG/1
2 TABLET ORAL DAILY
Status: DISCONTINUED | OUTPATIENT
Start: 2019-05-24 | End: 2019-05-24

## 2019-05-23 RX ORDER — LIDOCAINE HYDROCHLORIDE 10 MG/ML
0.1 INJECTION INFILTRATION; PERINEURAL AS NEEDED
Status: DISCONTINUED | OUTPATIENT
Start: 2019-05-23 | End: 2019-05-23 | Stop reason: HOSPADM

## 2019-05-23 RX ORDER — ACETAMINOPHEN 500 MG
1000 TABLET ORAL EVERY 6 HOURS
Status: DISCONTINUED | OUTPATIENT
Start: 2019-05-24 | End: 2019-05-25 | Stop reason: HOSPADM

## 2019-05-23 RX ORDER — SODIUM CHLORIDE, SODIUM LACTATE, POTASSIUM CHLORIDE, CALCIUM CHLORIDE 600; 310; 30; 20 MG/100ML; MG/100ML; MG/100ML; MG/100ML
75 INJECTION, SOLUTION INTRAVENOUS CONTINUOUS
Status: DISCONTINUED | OUTPATIENT
Start: 2019-05-23 | End: 2019-05-23 | Stop reason: HOSPADM

## 2019-05-23 RX ORDER — OXYCODONE HYDROCHLORIDE 5 MG/1
5 TABLET ORAL
Status: DISCONTINUED | OUTPATIENT
Start: 2019-05-23 | End: 2019-05-23 | Stop reason: HOSPADM

## 2019-05-23 RX ORDER — FENTANYL CITRATE 50 UG/ML
100 INJECTION, SOLUTION INTRAMUSCULAR; INTRAVENOUS
Status: COMPLETED | OUTPATIENT
Start: 2019-05-23 | End: 2019-05-23

## 2019-05-23 RX ORDER — ACETAMINOPHEN 10 MG/ML
1000 INJECTION, SOLUTION INTRAVENOUS ONCE
Status: COMPLETED | OUTPATIENT
Start: 2019-05-23 | End: 2019-05-23

## 2019-05-23 RX ORDER — PROMETHAZINE HYDROCHLORIDE 25 MG/1
25 TABLET ORAL
Status: DISCONTINUED | OUTPATIENT
Start: 2019-05-23 | End: 2019-05-25 | Stop reason: HOSPADM

## 2019-05-23 RX ORDER — ROSUVASTATIN CALCIUM 5 MG/1
10 TABLET, COATED ORAL
Status: DISCONTINUED | OUTPATIENT
Start: 2019-05-23 | End: 2019-05-25 | Stop reason: HOSPADM

## 2019-05-23 RX ORDER — ASPIRIN 81 MG/1
81 TABLET ORAL EVERY 12 HOURS
Status: DISCONTINUED | OUTPATIENT
Start: 2019-05-23 | End: 2019-05-25 | Stop reason: HOSPADM

## 2019-05-23 RX ORDER — HYDROMORPHONE HYDROCHLORIDE 2 MG/ML
1 INJECTION, SOLUTION INTRAMUSCULAR; INTRAVENOUS; SUBCUTANEOUS
Status: DISCONTINUED | OUTPATIENT
Start: 2019-05-23 | End: 2019-05-25 | Stop reason: HOSPADM

## 2019-05-23 RX ORDER — AMOXICILLIN 250 MG
2 CAPSULE ORAL DAILY
Status: DISCONTINUED | OUTPATIENT
Start: 2019-05-24 | End: 2019-05-25 | Stop reason: HOSPADM

## 2019-05-23 RX ORDER — MIDAZOLAM HYDROCHLORIDE 1 MG/ML
2 INJECTION, SOLUTION INTRAMUSCULAR; INTRAVENOUS
Status: DISCONTINUED | OUTPATIENT
Start: 2019-05-23 | End: 2019-05-23 | Stop reason: HOSPADM

## 2019-05-23 RX ORDER — DILTIAZEM HYDROCHLORIDE 240 MG/1
240 CAPSULE, COATED, EXTENDED RELEASE ORAL DAILY
Status: DISCONTINUED | OUTPATIENT
Start: 2019-05-24 | End: 2019-05-25 | Stop reason: HOSPADM

## 2019-05-23 RX ORDER — CELECOXIB 200 MG/1
200 CAPSULE ORAL EVERY 12 HOURS
Status: DISCONTINUED | OUTPATIENT
Start: 2019-05-23 | End: 2019-05-25 | Stop reason: HOSPADM

## 2019-05-23 RX ORDER — FLUMAZENIL 0.1 MG/ML
0.2 INJECTION INTRAVENOUS
Status: DISCONTINUED | OUTPATIENT
Start: 2019-05-23 | End: 2019-05-23 | Stop reason: HOSPADM

## 2019-05-23 RX ADMIN — PROMETHAZINE HYDROCHLORIDE 25 MG: 25 TABLET ORAL at 20:07

## 2019-05-23 RX ADMIN — Medication 2 G: at 11:32

## 2019-05-23 RX ADMIN — INSULIN HUMAN 26 UNITS: 100 INJECTION, SUSPENSION SUBCUTANEOUS at 22:43

## 2019-05-23 RX ADMIN — METFORMIN HYDROCHLORIDE 1000 MG: 500 TABLET ORAL at 18:57

## 2019-05-23 RX ADMIN — HYDROMORPHONE HYDROCHLORIDE 0.5 MG: 2 INJECTION INTRAMUSCULAR; INTRAVENOUS; SUBCUTANEOUS at 14:19

## 2019-05-23 RX ADMIN — ASPIRIN 81 MG: 81 TABLET ORAL at 19:51

## 2019-05-23 RX ADMIN — SODIUM CHLORIDE 100 ML/HR: 900 INJECTION, SOLUTION INTRAVENOUS at 15:00

## 2019-05-23 RX ADMIN — OXYCODONE HYDROCHLORIDE 10 MG: 5 TABLET ORAL at 19:52

## 2019-05-23 RX ADMIN — FAMOTIDINE 20 MG: 10 INJECTION, SOLUTION INTRAVENOUS at 07:54

## 2019-05-23 RX ADMIN — ROPIVACAINE HYDROCHLORIDE 40 MG: 2 INJECTION, SOLUTION EPIDURAL; INFILTRATION; PERINEURAL at 09:49

## 2019-05-23 RX ADMIN — TUBERCULIN PURIFIED PROTEIN DERIVATIVE 5 UNITS: 5 INJECTION, SOLUTION INTRADERMAL at 07:54

## 2019-05-23 RX ADMIN — SODIUM CHLORIDE, SODIUM LACTATE, POTASSIUM CHLORIDE, AND CALCIUM CHLORIDE: 600; 310; 30; 20 INJECTION, SOLUTION INTRAVENOUS at 11:10

## 2019-05-23 RX ADMIN — SODIUM CHLORIDE, SODIUM LACTATE, POTASSIUM CHLORIDE, AND CALCIUM CHLORIDE: 600; 310; 30; 20 INJECTION, SOLUTION INTRAVENOUS at 12:54

## 2019-05-23 RX ADMIN — ONDANSETRON 4 MG: 2 INJECTION INTRAMUSCULAR; INTRAVENOUS at 12:46

## 2019-05-23 RX ADMIN — Medication 2 G: at 15:44

## 2019-05-23 RX ADMIN — MIDAZOLAM 1 MG: 1 INJECTION INTRAMUSCULAR; INTRAVENOUS at 09:46

## 2019-05-23 RX ADMIN — ROSUVASTATIN CALCIUM 10 MG: 5 TABLET, FILM COATED ORAL at 22:54

## 2019-05-23 RX ADMIN — HYDROMORPHONE HYDROCHLORIDE 0.5 MG: 2 INJECTION INTRAMUSCULAR; INTRAVENOUS; SUBCUTANEOUS at 14:24

## 2019-05-23 RX ADMIN — DEXAMETHASONE SODIUM PHOSPHATE 10 MG: 4 INJECTION, SOLUTION INTRA-ARTICULAR; INTRALESIONAL; INTRAMUSCULAR; INTRAVENOUS; SOFT TISSUE at 11:37

## 2019-05-23 RX ADMIN — PROPOFOL 100 MCG/KG/MIN: 10 INJECTION, EMULSION INTRAVENOUS at 11:31

## 2019-05-23 RX ADMIN — FENTANYL CITRATE 50 MCG: 50 INJECTION INTRAMUSCULAR; INTRAVENOUS at 09:46

## 2019-05-23 RX ADMIN — CELECOXIB 200 MG: 200 CAPSULE ORAL at 19:52

## 2019-05-23 RX ADMIN — ACETAMINOPHEN 1000 MG: 10 INJECTION, SOLUTION INTRAVENOUS at 15:44

## 2019-05-23 RX ADMIN — Medication 1 AMPULE: at 19:52

## 2019-05-23 RX ADMIN — TRANEXAMIC ACID 1000 MG: 100 INJECTION, SOLUTION INTRAVENOUS at 11:39

## 2019-05-23 RX ADMIN — OXYCODONE HYDROCHLORIDE 10 MG: 5 TABLET ORAL at 15:44

## 2019-05-23 RX ADMIN — INSULIN HUMAN 28 UNITS: 100 INJECTION, SOLUTION PARENTERAL at 22:42

## 2019-05-23 RX ADMIN — SODIUM CHLORIDE, SODIUM LACTATE, POTASSIUM CHLORIDE, AND CALCIUM CHLORIDE: 600; 310; 30; 20 INJECTION, SOLUTION INTRAVENOUS at 11:18

## 2019-05-23 RX ADMIN — Medication 3 AMPULE: at 07:54

## 2019-05-23 RX ADMIN — CELECOXIB 200 MG: 200 CAPSULE ORAL at 07:55

## 2019-05-23 RX ADMIN — LIDOCAINE HYDROCHLORIDE 0.1 ML: 10 INJECTION, SOLUTION INFILTRATION; PERINEURAL at 07:54

## 2019-05-23 RX ADMIN — HYDROMORPHONE HYDROCHLORIDE 1 MG: 2 INJECTION INTRAMUSCULAR; INTRAVENOUS; SUBCUTANEOUS at 22:52

## 2019-05-23 NOTE — PERIOP NOTES
TRANSFER - OUT REPORT:    Verbal report given to Margot Harmon RN on Clarke Antonio  being transferred to [facility] for routine post - op       Report consisted of patients Situation, Background, Assessment and   Recommendations(SBAR). Information from the following report(s) OR Summary, Procedure Summary, Intake/Output and MAR was reviewed with the receiving nurse. Opportunity for questions and clarification was provided.       Patient transported with:   O2 @ 3 liters

## 2019-05-23 NOTE — ANESTHESIA POSTPROCEDURE EVALUATION
Procedure(s):  KNEE ARTHROPLASTY TOTAL/ LEFT/.    spinal, total IV anesthesia    Anesthesia Post Evaluation      Multimodal analgesia: multimodal analgesia used between 6 hours prior to anesthesia start to PACU discharge  Patient location during evaluation: PACU  Patient participation: complete - patient participated  Level of consciousness: awake  Pain management: adequate  Airway patency: patent  Anesthetic complications: no  Cardiovascular status: acceptable  Respiratory status: spontaneous ventilation and acceptable  Hydration status: acceptable  Post anesthesia nausea and vomiting:  none      Vitals Value Taken Time   /54 5/23/2019  2:14 PM   Temp 36.2 °C (97.2 °F) 5/23/2019  1:14 PM   Pulse 71 5/23/2019  2:14 PM   Resp 16 5/23/2019  2:14 PM   SpO2 96 % 5/23/2019  2:14 PM

## 2019-05-23 NOTE — PROGRESS NOTES
[facility]  Face to Face Encounter    Patients Name: Emily Sandra    YOB: 1950    Ordering Physician: Ariana Shah    Primary Diagnosis: Unilateral primary osteoarthritis, left knee [M17.12]  Arthritis of knee, left [M17.12]    Date of Face to Face:   5/23/2019                                  Face to Face Encounter findings are related to primary reason for home care:   yes. 1. I certify that the patient needs intermittent care as follows: physical therapy: strengthening and stretching/ROM    2. I certify that this patient is homebound, that is: 1) patient requires the use of a walker device, special transportation, or assistance of another to leave the home; or 2) patient's condition makes leaving the home medically contraindicated; and 3) patient has a normal inability to leave the home and leaving the home requires considerable and taxing effort. Patient may leave the home for infrequent and short duration for medical reasons, and occasional absences for non-medical reasons. Homebound status is due to the following functional limitations: Patient currently under activity restrictions secondary to recent surgical procedure, this hinders their ability to safely leave the home. 3. I certify that this patient is under my care and that I, or a nurse practitioner or  [id number], or clinical nurse specialist, or certified nurse midwife, working with me, had a Face-to-Face Encounter that meets the physician Face-to-Face Encounter requirements. The following are the clinical findings from the [address] [place] [address] encounter that support the need for skilled services and is a summary of the encounter: progress notes     See attached progess note      TAMIKO Matt  5/23/2019      THE FOLLOWING TO BE COMPLETED BY THE COMMUNITY PHYSICIAN:    I concur with the findings described above from the [facility] encounter that this patient is homebound and in need of a skilled service.     Certifying Physician: _____________________________________      Printed Certifying Physician Name: _____________________________________    Date: _________________

## 2019-05-23 NOTE — PROGRESS NOTES
05/23/19 1507   Oxygen Therapy   O2 Sat (%) 97 %   Pulse via Oximetry 73 beats per minute   O2 Device Nasal cannula   O2 Flow Rate (L/min) 1.5 l/min   Incentive Spirometry Treatment   Actual Volume (ml) 1500 ml   Number of Attempts 1   Joint Camp Notes Reviewed. Pt working on IS. Pt encouraged to do 10 breaths per hour while awake on IS. Good NPC. No respiratory distress noted at this time. No complications noted at this time.  Home cpap at bedside

## 2019-05-23 NOTE — PERIOP NOTES
TRANSFER - OUT REPORT:    Verbal report given to KALYN Martinez on Jania Brito  being transferred to [facility] for routine progression of care       Report consisted of patients Situation, Background, Assessment and   Recommendations(SBAR). Information from the following report(s) Kardex, MAR and Recent Results was reviewed with the receiving nurse. Lines:   Peripheral IV 05/23/19 Right Hand (Active)   Site Assessment Clean, dry, & intact 5/23/2019  8:16 AM   Phlebitis Assessment 0 5/23/2019  8:16 AM   Infiltration Assessment 0 5/23/2019  8:16 AM   Dressing Status Clean, dry, & intact 5/23/2019  8:16 AM   Dressing Type Tape 5/23/2019  8:16 AM   Hub Color/Line Status Pink; Infusing;Patent 5/23/2019  8:16 AM   Action Taken Blood drawn 5/23/2019  8:16 AM        Opportunity for questions and clarification was provided.       Patient transported with:   Lever

## 2019-05-23 NOTE — ANESTHESIA PREPROCEDURE EVALUATION
Relevant Problems   No relevant active problems       Anesthetic History     Increased risk of difficult airway (Per patient.  No records available.)          Review of Systems / Medical History  Patient summary reviewed and pertinent labs reviewed    Pulmonary        Sleep apnea: CPAP           Neuro/Psych   Within defined limits           Cardiovascular    Hypertension: well controlled          Hyperlipidemia    Exercise tolerance: <4 METS: Limited by knee pain  Comments: Denies recent CP, SOB or Palpitations   GI/Hepatic/Renal  Within defined limits              Endo/Other    Diabetes (Last Hgb A1c of 7.3): well controlled, type 2    Morbid obesity and arthritis     Other Findings   Comments: DJD           Physical Exam    Airway  Mallampati: IV  TM Distance: < 4 cm  Neck ROM: normal range of motion   Mouth opening: Normal     Cardiovascular  Regular rate and rhythm,  S1 and S2 normal,  no murmur, click, rub, or gallop             Dental  No notable dental hx       Pulmonary  Breath sounds clear to auscultation               Abdominal  GI exam deferred       Other Findings            Anesthetic Plan    ASA: 3  Anesthesia type: spinal      Post-op pain plan if not by surgeon: peripheral nerve block single      Anesthetic plan and risks discussed with: Patient

## 2019-05-23 NOTE — OP NOTES
[facility]  Cemented Total Knee Arthroplasty  Patient:Megha Santillan   : 1950  Medical Record Number:[id number]  Pre-operative Diagnosis:   [M17.12]  Post-operative Diagnosis: Unilateral primary osteoarthritis, left knee    Surgeon: Lizbeth Valdez MD  Assistant: Kianna Hewitt PA-C    Anesthesia: Spinal    Procedure: Total Knee Arthroplasty with use of Bone Cement  The complexity of the total joint surgery requires the use of a first assistant for positioning, retraction and assistance in closure. The patient's Body mass index is 40.34 kg/m²., BMI's greater then 40 make surgical exposure and retraction extremely difficult and increase operative time. Tourniquet Time: none  EBL: 150cc  Additional Findings: Severe DJD with massive tricompartmental osteophyte formation/LArge inf medial periarticular synovial cyst adjacent to the tibia Pre-op ROM 15-65/ Post-op 0-125  Releases -  lateral retincular release for exposure     Hossein Dodd was brought to the operating room and positioned on the operating table. She was anethestized  A wood catheter was placed preoperatively and IV antibiotics was administered. Prior to the incision being made a timeout was called identifying the patient, procedure ,operative side and surgeon. . The left leg was prepped and draped in the usual sterile manner  An anterior longitudinal incision was accomplished just medial to the tibial tubercle and extending approximal 6 centimeters proximal to the superior pole of the patella. A medial parapatellar capsular incision was performed. The medial capsular flap was elevated around to the insertion of the semimembranous tendon. The patella was everted and the knee flexed and externally rotated. The medial and external menisci were excised. The lateral half of the fat pad excised and the patella femoral ligament was released.   The anterior cruciate ligament was resected and the posterior cruciate ligament was substituted. Using extramedullary instrumentation, the tibial cut was accomplished with appropriate posterior slope. Approxiamately 2 mm of bone was removed from the low side of the tibia. The distal femur was next addressed. A drill hole was made above the intracondylar notch. Using appropriate intramedullary instrumentation,a 6 degree valgus distal cut was accomplished. A femur was sized. The anterior and posterior cuts were then made about the distal femur. The osteophytes were removed from the tibial and femoral surfaces. The flexion and extension gaps were assessed with the appropriate spacer blocks. Additional surgical procedures included none. The flexion and extension gaps were deemed appropriately balanced. The appropriate cutting blocks were then utilized to perform the anterior chamfer, posterior chamfer and notch cuts, with appropriate lateral tranlation accomplished for the patellofemoral groove. The tibia was sized. The tibial base plate was pinned into place with the appropriate external rotation and stem site prepared. A preliminary range of motion was accomplished with the above size trial components. A polyethylene insert allowed the patient to obtain full extension as well as appropriate flexion. The patient's ligaments were stable in flexion and extension to medial and lateral stressing and the alignment was through the appropriate mechanical axis. The patella was then everted. The bone was resected appropriately for a pegged patella button. A trial reduction revealed appropriate tracking through the patellofemoral groove. All trial components were removed and the cut surfaces prepared for cementing with irrigation and debridement of the bone interstices. The implants were cemented into position and pressurized in the usual fashion. Bone and cement debris were meticulously removed. The betadine lavage protocol was used.     Latrell Hyacinth knee was placed through range of motion and noted to be stable as mentioned above with the trail components. The wound was dry, therefore no drain was used. The operative knee was injected with 60cc of Naropin, 10 cc's of morphine and 1 cc of 30mg of Toradol. The capsular layer was closed using a #1 vicryl suture, while subcutaneous layers were closed using 2-0 Vicryl interrupted sutures. Finally the skin was closed using 3-0 Vicryl and skin staples, which were applied in occlusive fashion and sterile bandage applied. An Iceman cryo pad was applied on the operative leg. Sponge count and needle counts were correct. Kati Barbosa left the operating room     Implants:   Implant Name Type Inv.  Item Serial No.  Lot No. LRB No. Used   CEMENT BNE HV R 40GM -- PALACOS R [id number] - [id number]  CEMENT BNE HV R 40GM -- PALACOS R [id number] [place] [id number] Left 2   PAT PATTIE DOME MEDIAL 38MM -- ATTUNE - [id number]  PAT PATTIE DOME MEDIAL 38MM -- ATTUNE [id number] [facility] [id number] Left 1   FEM PS ADRIENNE PATTIE LT SZ 6 -- ATTUNE - [id number]  FEM PS ADRIENNE PATTIE LT SZ 6 -- ATTUNE [id number] [facility] [id number] Left 1   BEARING TIB BASE FIX SZ5 PATTIE -- ATTUNE - [id number]  BEARING TIB BASE FIX SZ5 PATTIE -- ATTUNE [id number] [facility] [id number] Left 1   INSERT TIB FB PS SZ 6 6MM -- ATTUNE - [id number]  INSERT TIB FB PS SZ 6 6MM -- ATTUNE [id number] [facility] [id number] Left 1     Signed By: Corwin Espinal MD

## 2019-05-23 NOTE — PHYSICIAN ADVISORY
Letter of Determination: Inpatient Status Appropriate    This patient was originally hospitalized as Inpatient Status on 5/22/2019 for scheduled left total knee arthroplasty. This patient is appropriate for Inpatient Admission in accordance with CMS regulation Section 43 .3. Specifically, patient's stay is expected to be more than Two Midnights and was medically necessary. The patient's stay was medically necessary based on age > 72, morbid obesity with body mass index of 40.8, diabetes mellitus type 2, and hypertension. Consistent with CMS guidelines, patient meets for inpatient status. It is our recommendation that this patient's hospitalization status should be INPATIENT status.      The final decision regarding the patient's hospitalization status depends on the attending physician's judgement.     Marylu Reid MD, TATIANA,   Physician [place].

## 2019-05-23 NOTE — PROGRESS NOTES
TRANSFER - IN REPORT:    Verbal report received from Isidro RN(name) on Gwendolyn Maurice  being received from [facility]) for routine post - op      Report consisted of patients Situation, Background, Assessment and   Recommendations(SBAR). Information from the following report(s) SBAR, Kardex, OR Summary, Procedure Summary, Intake/Output and MAR was reviewed with the receiving nurse. Opportunity for questions and clarification was provided. Assessment completed upon patients arrival to unit and care assumed.

## 2019-05-23 NOTE — CONSULTS
Hospitalist Consult    Patient: Jaziel Brown MRN: [id number]  SSN: [id number]    YOB: 1950  Age: 79 y.o. Sex: female      Subjective:      Jaziel Brown is a 79 y.o. female with a PMH of HTN, IDDM2, HLD, and arthritis s/p left TKA who is being seen for diabetes and HTN management. She had a left TKA and is doing well post-op. There were no barry-operative complications. EBL 150ml. She is doing well. She complains of 4/10 left knee pain. Denies CP/SOB. Denies F/C/N/V. Review of systems negative except stated above. Past Medical History:   Diagnosis Date    Breast cancer ([facility] [address] 2007    right breast    DJD (degenerative joint disease) 04/30/2013    Bilateral knees    Essential hypertension, benign 04/30/2013    on med for control     Generalized osteoarthrosis, involving multiple sites 4/30/2013    Hand eczema 4/30/2013    Heart murmur     echo 9/18/18: estimated EF 55%    Mixed hyperlipidemia 04/30/2013    on med    Morbid obesity ([facility] [address] 04/30/2013    Routine general medical examination at a health care facility 4/30/2013    Type II or unspecified type diabetes mellitus without mention of complication, not stated as uncontrolled 04/30/2013    oral and insulin meds; checks bs 4 x day; average bs ranges (140-200); last A1C=7.4 on 12/18/18    Unspecified sleep apnea 04/30/2013    c-pap; instructed to bring dos.       Past Surgical History:   Procedure Laterality Date    BREAST SURGERY PROCEDURE UNLISTED  08/2010    let breast cyst aspirated    HX BREAST LUMPECTOMY Right 2007    cancer    HX CHOLECYSTECTOMY  1981    HX TONSILLECTOMY  ~1950    HX TUBAL LIGATION  1984      Family History   Problem Relation Age of Onset    Heart Disease Mother     Elevated Lipids Mother     Hypertension Mother     Diabetes Mother         type 2    Dementia Father 80    Heart Disease Father     Elevated Lipids Father     Hypertension Father     Diabetes Father         type 2    No Known Problems Sister     No Known Problems Brother     Breast Cancer Sister     Stroke Maternal Grandmother     No Known Problems Daughter     No Known Problems Son      Social History     Tobacco Use    Smoking status: Never Smoker    Smokeless tobacco: Never Used   Substance Use Topics    Alcohol use: No     Alcohol/week: 0.0 oz      Current Facility-Administered Medications   Medication Dose Route Frequency Provider Last Rate Last Dose    tuberculin injection 5 Units  5 Units IntraDERMal Kuldip Zelaya MD   5 Units at 05/23/19 0754    [START ON 5/24/2019] dilTIAZem CD (CARDIZEM CD) capsule 240 mg  240 mg Oral DAILY Marly Reyes MD        insulin NPH (NOVOLIN N, HUMULIN N) injection 26 Units  26 Units SubCUTAneous ACB&D Marly Reyes MD        insulin regular (Ramone Mario, HUMULIN R) injection   SubCUTAneous TIDAC Marly Reyes MD        [START ON 5/24/2019] lisinopril-hydroCHLOROthiazide (PRINZIDE, ZESTORETIC) 20-12.5 mg per tablet 2 Tab  2 Tab Oral DAILY Marly Reyes MD        rosuvastatin (CRESTOR) tablet 10 mg  10 mg Oral QHS Marly Reyes MD        alcohol 62% (NOZIN) nasal  1 Ampule  1 Ampule Topical Q12H Marly Reyes MD        0.9% sodium chloride infusion  100 mL/hr IntraVENous CONTINUOUS Marly Reyes  mL/hr at 05/23/19 1500 100 mL/hr at 05/23/19 1500    sodium chloride (NS) flush 5-40 mL  5-40 mL IntraVENous Q8H Marly Reyes MD        sodium chloride (NS) flush 5-40 mL  5-40 mL IntraVENous PRN Marly Reyes MD        ceFAZolin (ANCEF) 2 g/20 mL in sterile water IV syringe  2 g IntraVENous Q8H Marly Reyes MD   2 g at 05/23/19 1544    [START ON 5/24/2019] acetaminophen (TYLENOL) tablet 1,000 mg  1,000 mg Oral Q6H Marly Reyes MD        celecoxib (CELEBREX) capsule 200 mg  200 mg Oral Q12H Marly Reyes MD        oxyCODONE IR (ROXICODONE) tablet 5-10 mg  5-10 mg Oral Q4H PRN Carine Eid MD   10 mg at 05/23/19 1544    HYDROmorphone (PF) (DILAUDID) injection 1 mg  1 mg IntraVENous Q3H PRN Carine Eid MD        naloxone [facility]) injection 0.2-0.4 mg  0.2-0.4 mg IntraVENous Q10MIN PRN Carine Eid MD        [START ON 5/24/2019] dexamethasone (DECADRON) injection 10 mg  10 mg IntraVENous ONCE Carine Eid MD        promethazine (PHENERGAN) tablet 25 mg  25 mg Oral Q6H PRN Carine Eid MD        diphenhydrAMINE (BENADRYL) capsule 25 mg  25 mg Oral Q4H PRN Carine Eid MD        [START ON 5/24/2019] senna-docusate (PERICOLACE) 8.6-50 mg per tablet 2 Tab  2 Tab Oral DAILY Carine Eid MD        aspirin delayed-release tablet 81 mg  81 mg Oral Q12H Carine Eid MD        metFORMIN (GLUCOPHAGE) tablet 1,000 mg  1,000 mg Oral BID WITH MEALS Carine Eid MD   1,000 mg at 05/23/19 1857        Allergies   Allergen Reactions    Ambien [Zolpidem] Palpitations     Ambien CR heart race. ambien-hypnotics hallucinations    Erythromycin Other (comments)     Increased heart rate     Norvasc [Amlodipine] Other (comments)     Leg swelling         Objective:     Vitals:    05/23/19 1429 05/23/19 1444 05/23/19 1500 05/23/19 1507   BP: 116/56 115/55 120/48    Pulse: 68 70 75    Resp: 16 16 16    Temp:   97.8 °F (36.6 °C)    SpO2: 92% 95% 97% 97%   Weight:       Height:            Physical Exam:   GENERAL: alert, cooperative, no distress, appears stated age  EYE: conjunctivae/corneas clear. PERRL. THROAT & NECK: normal and no erythema or exudates noted. LUNG: clear to auscultation bilaterally  HEART: regular rate and rhythm, S1S2, no murmur, no JVD  ABDOMEN: soft, non-tender, non-distended. Bowel sounds normal.   EXTREMITIES:  No edema, 2+ pedal/radial pulses bilaterally  SKIN: no rash or abnormalities  NEUROLOGIC: A&Ox3. Cranial nerves 2-12 grossly intact.     Lab/Data Review:  Recent Results (from the past 24 hour(s)) TYPE & SCREEN    Collection Time: 05/23/19  7:54 AM   Result Value Ref Range    Crossmatch Expiration 05/26/2019     ABO/Rh(D) A NEGATIVE     Antibody screen NEG    GLUCOSE, POC    Collection Time: 05/23/19  8:25 AM   Result Value Ref Range    Glucose (POC) 175 (H) 65 - 100 mg/dL   GLUCOSE, POC    Collection Time: 05/23/19  4:09 PM   Result Value Ref Range    Glucose (POC) 282 (H) 65 - 100 mg/dL       Imaging:  No results found. Cultures: All Micro Results     Procedure Component Value Units Date/Time    CULTURE, URINE [[id number]] Collected:  05/23/19 1200    Order Status:  Completed Specimen:  Urine from Maya Specimen Updated:  05/23/19 1829          Assessment/Plan:     Principal Problem:    Arthritis of knee, left (5/23/2019)  - Per primary team  - PT/OT  - Pain control    Active Problems:    S/P total knee arthroplasty (5/23/2019)  - Per primary team  - PT/OT  - Pain control      Essential hypertension, benign (4/30/2013)  - No acute issues  - Continue Cardizem  - Contineu Zestoretic      Mixed hyperlipidemia (4/30/2013)  - Continue Crestor      Sleep apnea (4/30/2013)  - Continue home CPAP      Morbid obesity ([address] (4/30/2013)      Type 2 diabetes with nephropathy ([address] (9/18/2018)  - A1C in 12/2018 was 7.4  - Continue Insulin NPH 26U BID  - Continue Novolin SSI  - Continue Metformin    Thank you for allowing us to participate in the care of this patient. We will follow along with you to make sure her blood sugars are well controlled.         Signed By: Maki Diana DO     May 23, 2019

## 2019-05-23 NOTE — PERIOP NOTES
Teach back method used with patient concerning hibiclens wash, TB screening, incentive spirometer (IS pending she forgot), and pain management goals.  Patient and family were provided with home discharge needs list.

## 2019-05-23 NOTE — PROGRESS NOTES
Care Management Interventions  PCP Verified by CM: Yes  Transition of Care Consult (CM Consult): [address]: Yes  Physical Therapy Consult: Yes  Current Support Network: Lives with Spouse  Confirm Follow Up Transport: Family  Plan discussed with Pt/Family/Caregiver: Yes  Discharge Location  Discharge Placement: Home with home health  SW met with pt who reports she wants to go home with HHPT. [facility] and is agreeable to [facility]. SF referral and F2F completed. Pt needed RW and BSC. DME delivered to pt's room from [facility]  No additional needs or questions identified at this time.    Amina Ortega

## 2019-05-23 NOTE — INTERVAL H&P NOTE
H&P Update:  Zeinab Mejia was seen and examined. History and physical has been reviewed. The patient has been examined.  There have been no significant clinical changes since the completion of the originally dated History and Physical.

## 2019-05-23 NOTE — PROGRESS NOTES
Problem: Self Care Deficits Care Plan (Adult)  Goal: *Acute Goals and Plan of Care (Insert Text)  Description  GOALS:   DISCHARGE GOALS (in preparation for going home/rehab):  3 days  1. Ms. Malena Moore will perform one lower body dressing activity with minimal assistance required to demonstrate improved functional mobility and safety. 2.  Ms. Malena Moore will perform one lower body bathing activity with minimal assistance required to demonstrate improved functional mobility and safety. 3.  Ms. Malena Moore will perform toileting/toilet transfer with contact guard assistance to demonstrate improved functional mobility and safety. 4.  Ms. Malena Moore will perform shower transfer with contact guard assistance to demonstrate improved functional mobility and safety. JOINT CAMP OCCUPATIONAL THERAPY TKA: Initial Assessment and Daily Note 5/23/2019  INPATIENT: Hospital Day: 1  [place] / [place] / Product Type: Medicare /      NAME/AGE/GENDER: Sonia Christina is a 79 y.o. female   PRIMARY DIAGNOSIS:   [M17.12]   Procedure(s) and Anesthesia Type:     * KNEE ARTHROPLASTY TOTAL/ LEFT/ - Spinal (Left)  ICD-10: Treatment Diagnosis:    Pain in Left Knee (M25.562)  Stiffness of Left Knee, Not elsewhere classified (A12.320)      ASSESSMENT:     Ms. Malena Moore is s/p Left TKA and presents with decreased weight bearing on L LE and decreased independence with functional mobility and activities of daily living as compared to baseline level of function and safety. Patient would benefit from skilled Occupational Therapy to maximize independence and safety with self-care task and functional mobility. Pt would also benefit from education on adaptive equipment and safety precautions in preparation for going home. Patient did suprisingly well despite limitations prior to surgery. Able to mobilize from bed to  using a RW. Pain well managed.       This section established at most recent assessment   PROBLEM LIST (Impairments causing functional limitations):  Decreased Strength  Decreased ADL/Functional Activities  Decreased Transfer Abilities  Increased Pain  Increased Fatigue  Decreased Flexibility/Joint Mobility  Decreased Knowledge of Precautions   INTERVENTIONS PLANNED: (Benefits and precautions of occupational therapy have been discussed with the patient.)  Activities of daily living training  Adaptive equipment training  Balance training  Clothing management  Donning&doffing training  Theraputic activity     TREATMENT PLAN: Frequency/Duration: Follow patient 1-2tx to address above goals. Rehabilitation Potential For Stated Goals: Excellent     RECOMMENDED REHABILITATION/EQUIPMENT: (at time of discharge pending progress): Continue Skilled Therapy. OCCUPATIONAL PROFILE AND HISTORY:   History of Present Injury/Illness (Reason for Referral): Pt presents this date s/p (left) TKA. Past Medical History/Comorbidities:   Ms. Enrike Dunn  has a past medical history of Breast cancer ([address] (2007), DJD (degenerative joint disease) (04/30/2013), Essential hypertension, benign (04/30/2013), Generalized osteoarthrosis, involving multiple sites (4/30/2013), Hand eczema (4/30/2013), Heart murmur, Mixed hyperlipidemia (04/30/2013), Morbid obesity ([address] (04/30/2013), Routine general medical examination at a health care facility (4/30/2013), Type II or unspecified type diabetes mellitus without mention of complication, not stated as uncontrolled (04/30/2013), and Unspecified sleep apnea (04/30/2013).  She also has no past medical history of Adverse effect of anesthesia, Aneurysm ([address] Arrhythmia, Asthma, Autoimmune disease ([address] CAD (coronary artery disease), Chronic kidney disease, Chronic obstructive pulmonary disease ([address] Chronic pain, Coagulation disorder ([address] Difficult intubation, Endocarditis, GERD (gastroesophageal reflux disease), Heart failure ([address] Ill-defined condition, Liver disease, Malignant hyperthermia due to anesthesia, Nausea & vomiting, Nicotine vapor product user, Non-nicotine vapor product user, Pseudocholinesterase deficiency, Psychiatric disorder, PUD (peptic ulcer disease), Rheumatic fever, Seizures ([address] Stroke ([address] Thromboembolus ([address] or Thyroid disease. Ms. Martina Castrejon  has a past surgical history that includes pr breast surgery procedure unlisted (08/2010); hx cholecystectomy (1981); hx tonsillectomy (~1950); hx tubal ligation (1984); and hx breast lumpectomy (Right, 2007). Social History/Living Environment:   Home Environment: Private residence  Living Alone: No  Support Systems: Spouse/Significant Other/Partner  Patient Expects to be Discharged to[de-identified] Private residence  Current DME Used/Available at Home: Wheelchair, Walker, rolling  Prior Level of Function/Work/Activity:  W/c long distance, walker in home. Assist LB ADL's. Preop ROM was 15-65, post op 0-120. Number of Personal Factors/Comorbidities that affect the Plan of Care: Brief history (0):  LOW COMPLEXITY   ASSESSMENT OF OCCUPATIONAL PERFORMANCE[de-identified]   Most Recent Physical Functioning:   Balance  Sitting: Intact  Standing: With support                    Coordination  Fine Motor Skills-Upper: Left Intact; Right Intact  Gross Motor Skills-Upper: Left Intact; Right Intact         Mental Status  Neurologic State: Alert  Orientation Level: Oriented X4  Cognition: Appropriate decision making  Perception: Appears intact  Perseveration: No perseveration noted                Basic ADLs (From Assessment) Complex ADLs (From Assessment)   Basic ADL  Feeding: Independent  Oral Facial Hygiene/Grooming: Setup  Bathing: Minimum assistance  Upper Body Dressing: Setup  Lower Body Dressing: Moderate assistance  Toileting: Moderate assistance     Grooming/Bathing/Dressing Activities of Daily Living                       Functional Transfers  Shower Transfer:  Moderate assistance     Bed/Mat Mobility  Supine to Sit: Contact guard assistance  Sit to Stand: Minimum assistance  Stand to Sit: Contact guard assistance  Bed to Chair: Minimum assistance  Scooting: Contact guard assistance         Physical Skills Involved:  Range of Motion  Balance  Strength Cognitive Skills Affected (resulting in the inability to perform in a timely and safe manner):  [facility]  Psychosocial Skills Affected:  WFL    Number of elements that affect the Plan of Care: 1-3:  LOW COMPLEXITY   CLINICAL DECISION MAKIN [facility] [place]  Ne? ?6 Clicks? Daily Activity Inpatient Short Form  How much help from another person does the patient currently need. .. Total A Lot A Little None   1. Putting on and taking off regular lower body clothing? ? 1   ? 2   ? 3   ? 4   2. Bathing (including washing, rinsing, drying)? ? 1   ? 2   ? 3   ? 4   3. Toileting, which includes using toilet, bedpan or urinal?   ? 1   ? 2   ? 3   ? 4   4. Putting on and taking off regular upper body clothing? ? 1   ? 2   ? 3   ? 4   5. Taking care of personal grooming such as brushing teeth? ? 1   ? 2   ? 3   ? 4   6. Eating meals? ? 1   ? 2   ? 3   ? 4   © , Trustees of [facility] [employer], under license to [employer]. All rights reserved     Score:  Initial: 18 Most Recent: X (Date: -- )    Interpretation of Tool:  Represents activities that are increasingly more difficult (i.e. Bed mobility, Transfers, Gait). Medical Necessity:     Skilled intervention continues to be required due to deficits listed above. Reason for Services/Other Comments:  Patient continues to require skilled intervention due to new TKA   .    Use of outcome tool(s) and clinical judgement create a POC that gives a: MODERATE COMPLEXITY            TREATMENT:   (In addition to Assessment/Re-Assessment sessions the following treatments were rendered)     Pre-treatment Symptoms/Complaints:    Pain: Initial:   Pain Intensity 1: 3  Post Session:  3     Assessment/Reassessment only, no treatment provided today    Treatment/Session Assessment: Response to Treatment:  Good, sitting up in recliner. Education:  ? Home Exercises  ? Fall Precautions  ? Hip Precautions ? Going Home Video  ? Knee/Hip Prosthesis Review  ? Walker Management/Safety ? Adaptive Equipment as Needed       Interdisciplinary Collaboration:   Physical Therapist  Occupational Therapist  Registered Nurse    After treatment position/precautions:   Up in chair  Bed/Chair-wheels locked  Caregiver at bedside  Call light within reach  RN notified     Compliance with Program/Exercises: Compliant all of the time, Will assess as treatment progresses. Recommendations/Intent for next treatment session:  Treatment next visit will focus on increasing Ms. Santillan's independence with bed mobility, transfers, self care, functional mobility, modalities for pain, and patient education.       Total Treatment Duration:  OT Patient Time In/Time Out  Time In: 1520  Time Out: [address] [facility] [address], OT

## 2019-05-23 NOTE — ANESTHESIA PROCEDURE NOTES
Peripheral Block    Start time: 5/23/2019 9:47 AM  End time: 5/23/2019 9:49 AM  Performed by: Jackson Murcia MD  Authorized by: Jackson Murcia MD       Pre-procedure: Indications: at surgeon's request and post-op pain management    Preanesthetic Checklist: patient identified, risks and benefits discussed, site marked, timeout performed, anesthesia consent given and patient being monitored    Timeout Time: 09:46          Block Type:   Block Type:   Adductor canal  Laterality:  Left  Monitoring:  Standard ASA monitoring, continuous pulse ox, frequent vital sign checks, heart rate, responsive to questions and oxygen  Injection Technique:  Single shot  Procedures: ultrasound guided    Patient Position: supine  Prep: chlorhexidine    Location:  Mid thigh  Needle Type:  Stimuplex  Needle Gauge:  22 G  Needle Localization:  Ultrasound guidance    Assessment:  Number of attempts:  1  Injection Assessment:  Incremental injection every 5 mL, local visualized surrounding nerve on ultrasound, negative aspiration for CSF, negative aspiration for blood, no paresthesia, no intravascular symptoms and ultrasound image on chart  Patient tolerance:  Patient tolerated the procedure well with no immediate complications

## 2019-05-23 NOTE — PROGRESS NOTES
Problem: Mobility Impaired (Adult and Pediatric)  Goal: *Acute Goals and Plan of Care (Insert Text)  Description  GOALS (1-4 days):  (1.)Ms. Gayle Headley will move from supine to sit and sit to supine  in bed with STAND BY ASSIST.  (2.)Ms. Gayle Headlye will transfer from bed to chair and chair to bed with STAND BY ASSIST using the least restrictive device. (3.)Ms. Gayle Headley will ambulate with STAND BY ASSIST for 200 feet with the least restrictive device. (4.)Ms. Gayle Headley will increase left knee ROM to 5°-80°.  ________________________________________________________________________________________________   Outcome: Progressing Towards Goal    PHYSICAL THERAPY JOINT CAMP TKA: Initial Assessment and PM 5/23/2019  INPATIENT: Hospital Day: 1  [place] / Product Type: Medicare /      NAME/AGE/GENDER: Samia Peters is a 79 y.o. female   PRIMARY DIAGNOSIS:   [M17.12]   Procedure(s) and Anesthesia Type:     * KNEE ARTHROPLASTY TOTAL/ LEFT/ - Spinal (Left)  ICD-10: Treatment Diagnosis:    Pain in Left Knee (M25.562)  Stiffness of Left Knee, Not elsewhere classified (M25.662)  Difficulty in walking, Not elsewhere classified (R26.2)      ASSESSMENT:     Ms. Gayle Headley presents with limited ROM and strength following her L TKA. She needs her R knee replaced at some point. She participated well with therapy and will benefit from PT to increase her functional independence prior to discharging home with PT. She stood and stepped to the chair and then performed therex.      This section established at most recent assessment   PROBLEM LIST (Impairments causing functional limitations):  Decreased Strength  Decreased Transfer Abilities  Decreased Ambulation Ability/Technique  Decreased Balance  Increased Pain  Decreased Flexibility/Joint Mobility   INTERVENTIONS PLANNED: (Benefits and precautions of physical therapy have been discussed with the patient.)  Cold  bed mobility  gait training  home exercise program (HEP)  Range of Motion: active/assisted/passive  Therapeutic Activities  therapeutic exercise/strengthening  transfer training  Group Therapy     TREATMENT PLAN: Frequency/Duration: Follow patient BID for duration of hospital stay to address above goals. Rehabilitation Potential For Stated Goals: Good     RECOMMENDED REHABILITATION/EQUIPMENT: (at time of discharge pending progress): Continue Skilled Therapy and Home Health: Physical Therapy. HISTORY:   History of Present Injury/Illness (Reason for Referral): Admitted for L TKA. Past Medical History/Comorbidities:   Ms. Suzie Eng  has a past medical history of Breast cancer ([address] (2007), DJD (degenerative joint disease) (04/30/2013), Essential hypertension, benign (04/30/2013), Generalized osteoarthrosis, involving multiple sites (4/30/2013), Hand eczema (4/30/2013), Heart murmur, Mixed hyperlipidemia (04/30/2013), Morbid obesity ([address] (04/30/2013), Routine general medical examination at a health care facility (4/30/2013), Type II or unspecified type diabetes mellitus without mention of complication, not stated as uncontrolled (04/30/2013), and Unspecified sleep apnea (04/30/2013). She also has no past medical history of Adverse effect of anesthesia, Aneurysm ([address] Arrhythmia, Asthma, Autoimmune disease ([address] CAD (coronary artery disease), Chronic kidney disease, Chronic obstructive pulmonary disease ([address] Chronic pain, Coagulation disorder ([address] Difficult intubation, Endocarditis, GERD (gastroesophageal reflux disease), Heart failure ([address] Ill-defined condition, Liver disease, Malignant hyperthermia due to anesthesia, Nausea & vomiting, Nicotine vapor product user, Non-nicotine vapor product user, Pseudocholinesterase deficiency, Psychiatric disorder, PUD (peptic ulcer disease), Rheumatic fever, Seizures ([address] Stroke ([address] Thromboembolus ([address] or Thyroid disease.   Ms. Suzie Eng  has a past surgical history that includes pr breast surgery procedure unlisted (2010); hx cholecystectomy (); hx tonsillectomy (~1950); hx tubal ligation (); and hx breast lumpectomy (Right, ). Social History/Living Environment:   Home Environment: Private residence  Living Alone: No  Support Systems: Spouse/Significant Other/Partner  Patient Expects to be Discharged to[de-identified] Private residence  Current DME Used/Available at Home: Wheelchair, Walker, rolling  Prior Level of Function/Work/Activity:  Ambulated with rolling walker for household distances only and used a wheelchair for community distances. Number of Personal Factors/Comorbidities that affect the Plan of Care: 0: LOW COMPLEXITY   EXAMINATION:   Most Recent Physical Functioning:   Gross Assessment: Yes  Gross Assessment  AROM: Generally decreased, functional(limited stanislav knees L > R)  Strength: Generally decreased, functional                     Bed Mobility  Supine to Sit: Contact guard assistance  Scooting: Contact guard assistance    Transfers  Sit to Stand: Minimum assistance  Stand to Sit: Contact guard assistance  Bed to Chair: Minimum assistance    Balance  Sitting: Intact  Standing: With support              Weight Bearing Status  Left Side Weight Bearing: As tolerated  Distance (ft): 5 Feet (ft)  Ambulation - Level of Assistance: Minimal assistance;Assist x1  Assistive Device: Walker, rolling  Speed/Radha: Slow  Step Length: Right shortened  Stance: Left decreased  Gait Abnormalities: Antalgic;Decreased step clearance; Step to gait  Interventions: Verbal cues; Safety awareness training     Braces/Orthotics: none    Left Knee Cold  Type: Cryocuff      Body Structures Involved:  Joints  Muscles Body Functions Affected: Movement Related Activities and Participation Affected: Mobility   Number of elements that affect the Plan of Care: 4+: HIGH COMPLEXITY   CLINICAL PRESENTATION:   Presentation: Stable and uncomplicated: LOW COMPLEXITY   CLINICAL DECISION MAKIN [address]? ?6 Clicks? Basic Mobility Inpatient Short Form  How much difficulty does the patient currently have. .. Unable A Lot A Little None   1. Turning over in bed (including adjusting bedclothes, sheets and blankets)? ? 1   ? 2   ? 3   ? 4   2. Sitting down on and standing up from a chair with arms ( e.g., wheelchair, bedside commode, etc.)   ? 1   ? 2   ? 3   ? 4   3. Moving from lying on back to sitting on the side of the bed?   ? 1   ? 2   ? 3   ? 4   How much help from another person does the patient currently need. .. Total A Lot A Little None   4. Moving to and from a bed to a chair (including a wheelchair)? ? 1   ? 2   ? 3   ? 4   5. Need to walk in hospital room? ? 1   ? 2   ? 3   ? 4   6. Climbing 3-5 steps with a railing? ? 1   ? 2   ? 3   ? 4   © 2007, Trustees of [facility], under license to [employer]. All rights reserved     Score:  Initial: 18 Most Recent: X (Date: -- )    Interpretation of Tool:  Represents activities that are increasingly more difficult (i.e. Bed mobility, Transfers, Gait). Medical Necessity:     Patient is expected to demonstrate progress in strength, range of motion and balance   to increase independence with gait and transfers   . Reason for Services/Other Comments:  Patient continues to require skilled intervention due to limited functional independence   . Use of outcome tool(s) and clinical judgement create a POC that gives a: Clear prediction of patient's progress: LOW COMPLEXITY            TREATMENT:   (In addition to Assessment/Re-Assessment sessions the following treatments were rendered)     Pre-treatment Symptoms/Complaints:  L knee pain  Pain: Initial:     5 Post Session:  5     Therapeutic Exercise: (10 Minutes):  Exercises per grid below to improve mobility, strength and balance. Required minimal verbal cues to promote proper body alignment. Date:  5/23 Date:   Date:     ACTIVITY/EXERCISE AM PM AM PM AM PM   GROUP THERAPY  ?  ?  ?  ?  ?  ?    Ankle Pumps 10       Quad Sets  10       Gluteal Sets  10       Hip ABd/ADduction  10       Straight Leg Raises  10       Knee Slides  10       Short Arc Quads         Long Arc Quads         Chair Slides                  B = bilateral; AA = active assistive; A = active; P = passive      Treatment/Session Assessment:     Response to Treatment:  tolerated therapy well. Education:  ? Home Exercises  ? Fall Precautions  ? Hip Precautions ? D/C Instruction Review  ? Knee/Hip Prosthesis Review  ? Walker Management/Safety ? Adaptive Equipment as Needed       Interdisciplinary Collaboration:   Physical Therapist  Occupational Therapist  Registered Nurse    After treatment position/precautions:   Up in chair  Bed/Chair-wheels locked  Bed in low position  Call light within reach  RN notified  Family at bedside    Compliance with Program/Exercises: Compliant all of the time, Will assess as treatment progresses. Recommendations/Intent for next treatment session:  Treatment next visit will focus on increasing Ms. Santillan's independence with bed mobility, transfers, gait training, strength/ROM exercises, modalities for pain, and patient education.       Total Treatment Duration:  PT Patient Time In/Time Out  Time In: 1530  Time Out: [address], PT

## 2019-05-23 NOTE — ANESTHESIA PROCEDURE NOTES
Spinal Block    Start time: 5/23/2019 11:24 AM  End time: 5/23/2019 11:26 AM  Performed by: Lauryn Rose MD  Authorized by: Lauryn Rose MD     Pre-procedure:   Indications: primary anesthetic  Preanesthetic Checklist: patient identified, risks and benefits discussed, anesthesia consent, patient being monitored and timeout performed    Timeout Time: 11:23          Spinal Block:   Patient Position:  Seated  Prep Region:  Lumbar  Prep: chlorhexidine and patient draped      Location:  L3-4  Technique:  Single shot    Local Dose (mL):  2    Needle:   Needle Type:  Pencan  Needle Gauge:  25 G  Attempts:  1      Events: CSF confirmed, no blood with aspiration and no paresthesia        Assessment:  Insertion:  Uncomplicated  Patient tolerance:  Patient tolerated the procedure well with no immediate complications

## 2019-05-24 PROBLEM — Z96.652 TOTAL KNEE REPLACEMENT STATUS, LEFT: Status: ACTIVE | Noted: 2019-05-24

## 2019-05-24 LAB
ANION GAP SERPL CALC-SCNC: 9 MMOL/L (ref 7–16)
ATRIAL RATE: 80 BPM
BUN SERPL-MCNC: 30 MG/DL (ref 8–23)
CALCIUM SERPL-MCNC: 8.9 MG/DL (ref 8.3–10.4)
CALCULATED P AXIS, ECG09: 41 DEGREES
CALCULATED R AXIS, ECG10: 21 DEGREES
CALCULATED T AXIS, ECG11: 41 DEGREES
CHLORIDE SERPL-SCNC: 106 MMOL/L (ref 98–107)
CO2 SERPL-SCNC: 21 MMOL/L (ref 21–32)
CREAT SERPL-MCNC: 1.21 MG/DL (ref 0.6–1)
DIAGNOSIS, 93000: NORMAL
EST. AVERAGE GLUCOSE BLD GHB EST-MCNC: 157 MG/DL
GLUCOSE BLD STRIP.AUTO-MCNC: 156 MG/DL (ref 65–100)
GLUCOSE BLD STRIP.AUTO-MCNC: 196 MG/DL (ref 65–100)
GLUCOSE BLD STRIP.AUTO-MCNC: 214 MG/DL (ref 65–100)
GLUCOSE SERPL-MCNC: 204 MG/DL (ref 65–100)
HBA1C MFR BLD: 7.1 %
MM INDURATION POC: 0 MM (ref 0–5)
P-R INTERVAL, ECG05: 198 MS
POTASSIUM SERPL-SCNC: 5.9 MMOL/L (ref 3.5–5.1)
POTASSIUM SERPL-SCNC: 6.3 MMOL/L (ref 3.5–5.1)
POTASSIUM SERPL-SCNC: 6.4 MMOL/L (ref 3.5–5.1)
PPD POC: NEGATIVE NEGATIVE
Q-T INTERVAL, ECG07: 340 MS
QRS DURATION, ECG06: 86 MS
QTC CALCULATION (BEZET), ECG08: 392 MS
SODIUM SERPL-SCNC: 136 MMOL/L (ref 136–145)
VENTRICULAR RATE, ECG03: 80 BPM

## 2019-05-24 PROCEDURE — 65270000029 HC RM PRIVATE

## 2019-05-24 PROCEDURE — 97150 GROUP THERAPEUTIC PROCEDURES: CPT

## 2019-05-24 PROCEDURE — 74011250637 HC RX REV CODE- 250/637: Performed by: INTERNAL MEDICINE

## 2019-05-24 PROCEDURE — 83036 HEMOGLOBIN GLYCOSYLATED A1C: CPT

## 2019-05-24 PROCEDURE — 84132 ASSAY OF SERUM POTASSIUM: CPT

## 2019-05-24 PROCEDURE — 82962 GLUCOSE BLOOD TEST: CPT

## 2019-05-24 PROCEDURE — 77030020263 HC SOL INJ SOD CL0.9% LFCR 1000ML

## 2019-05-24 PROCEDURE — 97110 THERAPEUTIC EXERCISES: CPT

## 2019-05-24 PROCEDURE — 97535 SELF CARE MNGMENT TRAINING: CPT

## 2019-05-24 PROCEDURE — 36415 COLL VENOUS BLD VENIPUNCTURE: CPT

## 2019-05-24 PROCEDURE — 74011250637 HC RX REV CODE- 250/637: Performed by: ORTHOPAEDIC SURGERY

## 2019-05-24 PROCEDURE — 74011250636 HC RX REV CODE- 250/636: Performed by: ORTHOPAEDIC SURGERY

## 2019-05-24 PROCEDURE — 74011636637 HC RX REV CODE- 636/637: Performed by: ORTHOPAEDIC SURGERY

## 2019-05-24 PROCEDURE — 97116 GAIT TRAINING THERAPY: CPT

## 2019-05-24 PROCEDURE — 80048 BASIC METABOLIC PNL TOTAL CA: CPT

## 2019-05-24 PROCEDURE — 77030020256 HC SOL INJ NACL 0.9%  500ML

## 2019-05-24 PROCEDURE — 93005 ELECTROCARDIOGRAM TRACING: CPT | Performed by: INTERNAL MEDICINE

## 2019-05-24 PROCEDURE — 74011250636 HC RX REV CODE- 250/636: Performed by: INTERNAL MEDICINE

## 2019-05-24 RX ORDER — SODIUM POLYSTYRENE SULFONATE 4.1 MEQ/G
15 POWDER, FOR SUSPENSION ORAL; RECTAL
Status: COMPLETED | OUTPATIENT
Start: 2019-05-24 | End: 2019-05-24

## 2019-05-24 RX ORDER — OXYCODONE HYDROCHLORIDE 5 MG/1
5-10 TABLET ORAL
Qty: 60 TAB | Refills: 0 | Status: SHIPPED | OUTPATIENT
Start: 2019-05-24 | End: 2019-05-31

## 2019-05-24 RX ORDER — ASPIRIN 81 MG/1
81 TABLET ORAL EVERY 12 HOURS
Qty: 60 TAB | Refills: 0 | Status: SHIPPED | OUTPATIENT
Start: 2019-05-24 | End: 2019-06-23

## 2019-05-24 RX ADMIN — Medication 1 AMPULE: at 08:08

## 2019-05-24 RX ADMIN — OXYCODONE HYDROCHLORIDE 10 MG: 5 TABLET ORAL at 00:57

## 2019-05-24 RX ADMIN — OXYCODONE HYDROCHLORIDE 5 MG: 5 TABLET ORAL at 12:26

## 2019-05-24 RX ADMIN — Medication 2 G: at 02:42

## 2019-05-24 RX ADMIN — SODIUM POLYSTYRENE SULFONATE 15 G: 4.1 POWDER, FOR SUSPENSION ORAL; RECTAL at 09:33

## 2019-05-24 RX ADMIN — ASPIRIN 81 MG: 81 TABLET ORAL at 21:43

## 2019-05-24 RX ADMIN — SODIUM CHLORIDE 500 ML: 900 INJECTION, SOLUTION INTRAVENOUS at 09:32

## 2019-05-24 RX ADMIN — INSULIN HUMAN 25 UNITS: 100 INJECTION, SOLUTION PARENTERAL at 17:17

## 2019-05-24 RX ADMIN — CELECOXIB 200 MG: 200 CAPSULE ORAL at 08:06

## 2019-05-24 RX ADMIN — ASPIRIN 81 MG: 81 TABLET ORAL at 08:07

## 2019-05-24 RX ADMIN — ACETAMINOPHEN 1000 MG: 500 TABLET, FILM COATED ORAL at 17:10

## 2019-05-24 RX ADMIN — Medication 1 AMPULE: at 21:52

## 2019-05-24 RX ADMIN — DILTIAZEM HYDROCHLORIDE 240 MG: 240 CAPSULE, COATED, EXTENDED RELEASE ORAL at 07:53

## 2019-05-24 RX ADMIN — OXYCODONE HYDROCHLORIDE 10 MG: 5 TABLET ORAL at 21:43

## 2019-05-24 RX ADMIN — ACETAMINOPHEN 1000 MG: 500 TABLET, FILM COATED ORAL at 00:57

## 2019-05-24 RX ADMIN — INSULIN HUMAN 25 UNITS: 100 INJECTION, SOLUTION PARENTERAL at 08:07

## 2019-05-24 RX ADMIN — SENNOSIDES AND DOCUSATE SODIUM 2 TABLET: 8.6; 5 TABLET ORAL at 08:06

## 2019-05-24 RX ADMIN — LISINOPRIL AND HYDROCHLOROTHIAZIDE 2 TABLET: 12.5; 2 TABLET ORAL at 08:07

## 2019-05-24 RX ADMIN — INSULIN HUMAN 26 UNITS: 100 INJECTION, SUSPENSION SUBCUTANEOUS at 08:07

## 2019-05-24 RX ADMIN — OXYCODONE HYDROCHLORIDE 5 MG: 5 TABLET ORAL at 10:31

## 2019-05-24 RX ADMIN — Medication 5 ML: at 10:31

## 2019-05-24 RX ADMIN — INSULIN HUMAN 26 UNITS: 100 INJECTION, SUSPENSION SUBCUTANEOUS at 21:46

## 2019-05-24 RX ADMIN — OXYCODONE HYDROCHLORIDE 10 MG: 5 TABLET ORAL at 17:10

## 2019-05-24 RX ADMIN — ROSUVASTATIN CALCIUM 10 MG: 5 TABLET, FILM COATED ORAL at 21:44

## 2019-05-24 RX ADMIN — CELECOXIB 200 MG: 200 CAPSULE ORAL at 21:43

## 2019-05-24 RX ADMIN — ACETAMINOPHEN 1000 MG: 500 TABLET, FILM COATED ORAL at 11:39

## 2019-05-24 RX ADMIN — INSULIN HUMAN 25 UNITS: 100 INJECTION, SOLUTION PARENTERAL at 12:26

## 2019-05-24 RX ADMIN — ACETAMINOPHEN 1000 MG: 500 TABLET, FILM COATED ORAL at 06:32

## 2019-05-24 RX ADMIN — OXYCODONE HYDROCHLORIDE 10 MG: 5 TABLET ORAL at 06:32

## 2019-05-24 RX ADMIN — METFORMIN HYDROCHLORIDE 1000 MG: 500 TABLET ORAL at 07:53

## 2019-05-24 NOTE — PROGRESS NOTES
Problem: Mobility Impaired (Adult and Pediatric)  Goal: *Acute Goals and Plan of Care (Insert Text)  Description  GOALS (1-4 days):  (1.)Ms. Asia Ortiz will move from supine to sit and sit to supine  in bed with STAND BY ASSIST.  (2.)Ms. Asia Ortiz will transfer from bed to chair and chair to bed with STAND BY ASSIST using the least restrictive device. (3.)Ms. Asia Ortiz will ambulate with STAND BY ASSIST for 200 feet with the least restrictive device. (4.)Ms. Asia Ortiz will increase left knee ROM to 5°-80°.  ________________________________________________________________________________________________   Outcome: Progressing Towards Goal    PHYSICAL THERAPY JOINT CAMP TKA: Daily Note and PM 5/24/2019  INPATIENT: Hospital Day: 2  [place] / Product Type: Medicare /      NAME/AGE/GENDER: Kunal Knapp is a 79 y.o. female   PRIMARY DIAGNOSIS:   [M17.12]   Procedure(s) and Anesthesia Type:     * KNEE ARTHROPLASTY TOTAL/ LEFT/ - Spinal (Left)  ICD-10: Treatment Diagnosis:    · Pain in Left Knee (M25.562)  · Stiffness of Left Knee, Not elsewhere classified (M25.662)  · Difficulty in walking, Not elsewhere classified (R26.2)      ASSESSMENT:     Ms. Asia Ortiz presents with limited ROM and strength following her L TKA. She needs her R knee replaced at some point. She participated well with therapy and will benefit from PT to increase her functional independence prior to discharging home with PT. She stood and stepped to the chair and then performed therex. 5/24 pm she walk to the gym and performs exercises without any problems. She will go home tomorrow. This section established at most recent assessment   PROBLEM LIST (Impairments causing functional limitations):  1. Decreased Strength  2. Decreased Transfer Abilities  3. Decreased Ambulation Ability/Technique  4. Decreased Balance  5. Increased Pain  6.  Decreased Flexibility/Joint Mobility   INTERVENTIONS PLANNED: (Benefits and precautions of physical therapy have been discussed with the patient.)  1. Cold  2. bed mobility  3. gait training  4. home exercise program (HEP)  5. Range of Motion: active/assisted/passive  6. Therapeutic Activities  7. therapeutic exercise/strengthening  8. transfer training  9. Group Therapy     TREATMENT PLAN: Frequency/Duration: Follow patient BID for duration of hospital stay to address above goals. Rehabilitation Potential For Stated Goals: Good     RECOMMENDED REHABILITATION/EQUIPMENT: (at time of discharge pending progress): Continue Skilled Therapy and Home Health: Physical Therapy. HISTORY:   History of Present Injury/Illness (Reason for Referral): Admitted for L TKA. Past Medical History/Comorbidities:   Ms. Rylee Bates  has a past medical history of Breast cancer ([address] (2007), DJD (degenerative joint disease) (04/30/2013), Essential hypertension, benign (04/30/2013), Generalized osteoarthrosis, involving multiple sites (4/30/2013), Hand eczema (4/30/2013), Heart murmur, Mixed hyperlipidemia (04/30/2013), Morbid obesity ([address] (04/30/2013), Routine general medical examination at a health care facility (4/30/2013), Type II or unspecified type diabetes mellitus without mention of complication, not stated as uncontrolled (04/30/2013), and Unspecified sleep apnea (04/30/2013).  She also has no past medical history of Adverse effect of anesthesia, Aneurysm ([address] Arrhythmia, Asthma, Autoimmune disease ([address] CAD (coronary artery disease), Chronic kidney disease, Chronic obstructive pulmonary disease ([address] Chronic pain, Coagulation disorder ([address] Difficult intubation, Endocarditis, GERD (gastroesophageal reflux disease), Heart failure ([address] Ill-defined condition, Liver disease, Malignant hyperthermia due to anesthesia, Nausea & vomiting, Nicotine vapor product user, Non-nicotine vapor product user, Pseudocholinesterase deficiency, Psychiatric disorder, PUD (peptic ulcer disease), Rheumatic fever, Seizures [facility]), Stroke ([facility] Thromboembolus ([facility] or Thyroid disease. Ms. Estiven Block  has a past surgical history that includes pr breast surgery procedure unlisted (08/2010); hx cholecystectomy (1981); hx tonsillectomy (~1950); hx tubal ligation (1984); and hx breast lumpectomy (Right, 2007). Social History/Living Environment:   Home Environment: Private residence  Living Alone: No  Support Systems: Spouse/Significant Other/Partner  Patient Expects to be Discharged to[de-identified] Private residence  Current DME Used/Available at Home: Walker, rolling, Wheelchair  Prior Level of Function/Work/Activity:  Ambulated with rolling walker for household distances only and used a wheelchair for community distances. Number of Personal Factors/Comorbidities that affect the Plan of Care: 0: LOW COMPLEXITY   EXAMINATION:   Most Recent Physical Functioning:                  LLE PROM  L Knee Flexion: 63  L Knee Extension: 6         Bed Mobility  Supine to Sit: Contact guard assistance  Scooting: (left up in chair)    Transfers  Sit to Stand: Contact guard assistance  Stand to Sit: Contact guard assistance  Bed to Chair: Contact guard assistance    Balance  Sitting: Intact  Standing: Intact; With support              Weight Bearing Status  Left Side Weight Bearing: As tolerated  Distance (ft): 104 Feet (ft)(x 2)  Ambulation - Level of Assistance: Minimal assistance  Assistive Device: Walker, rolling  Speed/Radha: Slow  Step Length: Right shortened  Stance: Left decreased  Gait Abnormalities: Antalgic;Decreased step clearance  Interventions: Safety awareness training     Braces/Orthotics: none    Left Knee Cold  Type: Cryocuff      Body Structures Involved:  1. Joints  2. Muscles Body Functions Affected:  1. Movement Related Activities and Participation Affected:  1.  Mobility   Number of elements that affect the Plan of Care: 4+: HIGH COMPLEXITY   CLINICAL PRESENTATION:   Presentation: Stable and uncomplicated: LOW COMPLEXITY   CLINICAL DECISION MAKING:   [employer] AM-PAC 6 Clicks   Basic Mobility Inpatient Short Form  How much difficulty does the patient currently have. .. Unable A Lot A Little None   1. Turning over in bed (including adjusting bedclothes, sheets and blankets)? ? 1   ? 2   ? 3   ? 4   2. Sitting down on and standing up from a chair with arms ( e.g., wheelchair, bedside commode, etc.)   ? 1   ? 2   ? 3   ? 4   3. Moving from lying on back to sitting on the side of the bed?   ? 1   ? 2   ? 3   ? 4   How much help from another person does the patient currently need. .. Total A Lot A Little None   4. Moving to and from a bed to a chair (including a wheelchair)? ? 1   ? 2   ? 3   ? 4   5. Need to walk in hospital room? ? 1   ? 2   ? 3   ? 4   6. Climbing 3-5 steps with a railing? ? 1   ? 2   ? 3   ? 4   © 2007, Trustees of [facility] [employer] [employer], under license to [employer]. All rights reserved     Score:  Initial: 18 Most Recent: X (Date: -- )    Interpretation of Tool:  Represents activities that are increasingly more difficult (i.e. Bed mobility, Transfers, Gait). Medical Necessity:     · Patient is expected to demonstrate progress in strength, range of motion and balance  ·  to increase independence with gait and transfers   · . Reason for Services/Other Comments:  · Patient continues to require skilled intervention due to limited functional independence   · . Use of outcome tool(s) and clinical judgement create a POC that gives a: Clear prediction of patient's progress: LOW COMPLEXITY            TREATMENT:   (In addition to Assessment/Re-Assessment sessions the following treatments were rendered)     Pre-treatment Symptoms/Complaints: I am going to stay tonight  Pain: Initial:   Pain Intensity 1: 0(Hurting some after therapy) 5 Post Session:       Therapeutic Exercise: (45 Minutes(group therapy)):  Exercises per grid below to improve mobility, strength and balance.   Required minimal verbal cues to promote proper body alignment. Gait Training (15 Minutes):  Gait training to improve and/or restore physical functioning as related to mobility. Ambulated 104 Feet (ft)(x 2) with Minimal assistance using a Walker, rolling and minimal Safety awareness training related to their knee position and motion to promote proper body alignment. Date:  5/23 Date:  5/24   Date:     ACTIVITY/EXERCISE AM PM AM PM AM PM   GROUP THERAPY  ? ?  ?  x  ?  ? Ankle Pumps  10 15 15     Quad Sets  10 15 15     Gluteal Sets  10 15 15     Hip ABd/ADduction  10 15 15     Straight Leg Raises  10 15 15     Knee Slides  10 15 15     Short Arc Quads   15 15     Long Arc Quads         Chair Slides    15              B = bilateral; AA = active assistive; A = active; P = passive      Treatment/Session Assessment:     Response to Treatment:  tolerated group therapy well    Education:  ? Home Exercises  ? Fall Precautions  ? Hip Precautions ? D/C Instruction Review  X Knee/Hip Prosthesis Review  ? Walker Management/Safety ? Adaptive Equipment as Needed       Interdisciplinary Collaboration:   o Physical Therapy Assistant  o Registered Nurse    After treatment position/precautions:   o Up in chair  o Bed/Chair-wheels locked  o Bed in low position  o Call light within reach  o RN notified  o Family at bedside    Compliance with Program/Exercises: Compliant all of the time, Will assess as treatment progresses. Recommendations/Intent for next treatment session:  Treatment next visit will focus on increasing Ms. Santillan's independence with bed mobility, transfers, gait training, strength/ROM exercises, modalities for pain, and patient education.       Total Treatment Duration:  PT Patient Time In/Time Out  Time In: 1300  Time Out: [address], DO

## 2019-05-24 NOTE — PROGRESS NOTES
Hospitalist Progress Note    Patient: Swapna Martines MRN: [id number]  SSN: [id number]    YOB: 1950  Age: 79 y.o. Sex: female      Admit Date: 5/23/2019    LOS: 1 day     Subjective:     79 y.o. female with a PMH of HTN, IDDM2, HLD, and arthritis s/p left TKA who is being seen for diabetes and HTN management. She had a left TKA and is doing well post-op. There were no barry-operative complications. EBL 150ml. She is doing well. She complains of 4/10 left knee pain. Denies CP/SOB. Denies F/C/N/V.    5/24 - She feels good today. Pain minimal. Denies CP/SOB/palpitations. Review of systems negative except stated above. Objective:     Visit Vitals  /79   Pulse 88   Temp 96 °F (35.6 °C)   Resp 18   Ht 5' 4\" (1.626 m)   Wt 106.6 kg (235 lb)   SpO2 92%   BMI 40.34 kg/m²      Oxygen Therapy  O2 Sat (%): 92 % (05/24/19 0733)  Pulse via Oximetry: 73 beats per minute (05/23/19 1507)  O2 Device: Nasal cannula (05/23/19 1507)  O2 Flow Rate (L/min): 1.5 l/min (05/23/19 1507)      Intake and Output:     Intake/Output Summary (Last 24 hours) at 5/24/2019 0937  Last data filed at 5/24/2019 0636  Gross per 24 hour   Intake 5034 ml   Output 2950 ml   Net 2084 ml         Physical Exam:   GENERAL: alert, cooperative, no distress, appears stated age  EYE: conjunctivae/corneas clear. PERRL. THROAT & NECK: normal and no erythema or exudates noted. LUNG: clear to auscultation bilaterally  HEART: regular rate and rhythm, S1S2, no murmur, no JVD  ABDOMEN: soft, non-tender, non-distended. Bowel sounds normal.   EXTREMITIES:  No edema, 2+ pedal/radial pulses bilaterally  SKIN: no rash or abnormalities  NEUROLOGIC: A&Ox3. Cranial nerves 2-12 grossly intact. Lab/Data Review:  Recent Results (from the past 24 hour(s))   CULTURE, URINE    Collection Time: 05/23/19 12:00 PM   Result Value Ref Range    Special Requests: NO SPECIAL REQUESTS      Culture result:        NO GROWTH AFTER SHORT PERIOD OF INCUBATION. FURTHER RESULTS TO FOLLOW AFTER OVERNIGHT INCUBATION. GLUCOSE, POC    Collection Time: 05/23/19  4:09 PM   Result Value Ref Range    Glucose (POC) 282 (H) 65 - 100 mg/dL   HEMOGLOBIN    Collection Time: 05/23/19  7:41 PM   Result Value Ref Range    HGB 10.0 (L) 11.7 - 15.4 g/dL   GLUCOSE, POC    Collection Time: 05/23/19  8:14 PM   Result Value Ref Range    Glucose (POC) 355 (H) 65 - 565 mg/dL   METABOLIC PANEL, BASIC    Collection Time: 05/24/19  5:39 AM   Result Value Ref Range    Sodium 136 136 - 145 mmol/L    Potassium 6.3 (HH) 3.5 - 5.1 mmol/L    Chloride 106 98 - 107 mmol/L    CO2 21 21 - 32 mmol/L    Anion gap 9 7 - 16 mmol/L    Glucose 204 (H) 65 - 100 mg/dL    BUN 30 (H) 8 - 23 MG/DL    Creatinine 1.21 (H) 0.6 - 1.0 MG/DL    GFR est AA 57 (L) >60 ml/min/1.73m2    GFR est non-AA 47 (L) >60 ml/min/1.73m2    Calcium 8.9 8.3 - 10.4 MG/DL   HEMOGLOBIN A1C WITH EAG    Collection Time: 05/24/19  5:39 AM   Result Value Ref Range    Hemoglobin A1c 7.1 %    Est. average glucose 157 mg/dL   PLEASE READ & DOCUMENT PPD TEST IN 24 HRS    Collection Time: 05/24/19  7:43 AM   Result Value Ref Range    PPD Negative Negative    mm Induration 0 0 - 5 mm   POTASSIUM    Collection Time: 05/24/19  7:51 AM   Result Value Ref Range    Potassium 6.4 (HH) 3.5 - 5.1 mmol/L       Imaging:  No results found. No results found for this visit on 05/23/19. Cultures: All Micro Results     Procedure Component Value Units Date/Time    CULTURE, URINE [[id number]] Collected:  05/23/19 1200    Order Status:  Completed Specimen:  Urine from Maya Specimen Updated:  05/24/19 0702     Special Requests: NO SPECIAL REQUESTS        Culture result:       NO GROWTH AFTER SHORT PERIOD OF INCUBATION. FURTHER RESULTS TO FOLLOW AFTER OVERNIGHT INCUBATION.                 Assessment/Plan:     Principal Problem:    Arthritis of knee, left (5/23/2019)  - Per primary team  - PT/OT  - Pain control     Active Problems:    S/P total knee arthroplasty (5/23/2019)  - Per primary team  - PT/OT  - Pain control      Acute Hyperkalemia  - Potassium 6.3 --> Recheck 6.4  - Check 12 lead EKG  - Bolus 500ml normal saline  - Give Kayexalate  - Already getting insulin for DM  - Stop HCTZ/Lisinopril  - Recheck after lunch       Essential hypertension, benign (4/30/2013)  - No acute issues  - Continue Cardizem  - Hold Zestoretic       Mixed hyperlipidemia (4/30/2013)  - Continue Crestor       Sleep apnea (4/30/2013)  - Continue home CPAP       Morbid obesity ([facility] (4/30/2013)       Type 2 diabetes with nephropathy ([facility] (9/18/2018)  - A1C 7.1  - Continue Insulin NPH 26U BID  - Continue Novolin SSI  - Continue Metformin     Thank you for allowing us to participate in the care of this patient. We will follow along with you to make sure her blood sugars are well controlled.       Signed By: Brigette Wiseman DO     May 24, 2019

## 2019-05-24 NOTE — PROGRESS NOTES
Problem: Self Care Deficits Care Plan (Adult)  Goal: *Acute Goals and Plan of Care (Insert Text)  Description  GOALS: GOAL MET 5/24/2019  DISCHARGE GOALS (in preparation for going home/rehab):  3 days  1. Ms. Shereen Ocasio will perform one lower body dressing activity with minimal assistance required to demonstrate improved functional mobility and safety. 2.  Ms. Shereen Ocasio will perform one lower body bathing activity with minimal assistance required to demonstrate improved functional mobility and safety. 3.  Ms. Shereen Ocasio will perform toileting/toilet transfer with contact guard assistance to demonstrate improved functional mobility and safety. 4.  Ms. Shereen Ocasio will perform shower transfer with contact guard assistance to demonstrate improved functional mobility and safety. JOINT CAMP OCCUPATIONAL THERAPY TKA: Initial Assessment, Discharge, Treatment Day: 1st and AM 5/24/2019  INPATIENT: Hospital Day: 2  [place] / [place] / Product Type: Medicare /      NAME/AGE/GENDER: Raven Nava is a 79 y.o. female   PRIMARY DIAGNOSIS:   [M17.12]   Procedure(s) and Anesthesia Type:     * KNEE ARTHROPLASTY TOTAL/ LEFT/ - Spinal (Left)  ICD-10: Treatment Diagnosis:    · Pain in Left Knee (M25.562)  · Stiffness of Left Knee, Not elsewhere classified (P39.754)      ASSESSMENT:      Ms. Shereen Ocasio is s/p right TKA and presents with decreased weight bearing on right LE and decreased independence with functional mobility and activities of daily living. Patient completed shower and dressing as charter below in ADL grid and is ambulating with rolling walker and stand by assist.  Patient has met 4/4 goals and plans to return home with good family support. Pt did very well considering her limitation prior to surgery. Will do well at home. Family able to provide patient with appropriate level of assistance at this time.   OT reviewed safety precautions throughout session and therapy schedule for the remainder of today. Patient instructed to call for assistance when needing to get up from recliner and all needs in reach. Patient verbalized understanding of call light. This section established at most recent assessment   PROBLEM LIST (Impairments causing functional limitations):  1. Decreased Strength  2. Decreased ADL/Functional Activities  3. Decreased Transfer Abilities  4. Increased Pain  5. Increased Fatigue  6. Decreased Flexibility/Joint Mobility  7. Decreased Knowledge of Precautions   INTERVENTIONS PLANNED: (Benefits and precautions of occupational therapy have been discussed with the patient.)  1. Activities of daily living training  2. Adaptive equipment training  3. Balance training  4. Clothing management  5. Donning&doffing training  6. Theraputic activity     TREATMENT PLAN: Frequency/Duration: Follow patient 1-2tx to address above goals. Rehabilitation Potential For Stated Goals: Excellent     RECOMMENDED REHABILITATION/EQUIPMENT: (at time of discharge pending progress): Continue Skilled Therapy. OCCUPATIONAL PROFILE AND HISTORY:   History of Present Injury/Illness (Reason for Referral): Pt presents this date s/p (left) TKA. Past Medical History/Comorbidities:   Ms. Malena Moore  has a past medical history of Breast cancer ([address] (2007), DJD (degenerative joint disease) (04/30/2013), Essential hypertension, benign (04/30/2013), Generalized osteoarthrosis, involving multiple sites (4/30/2013), Hand eczema (4/30/2013), Heart murmur, Mixed hyperlipidemia (04/30/2013), Morbid obesity ([address] (04/30/2013), Routine general medical examination at a health care facility (4/30/2013), Type II or unspecified type diabetes mellitus without mention of complication, not stated as uncontrolled (04/30/2013), and Unspecified sleep apnea (04/30/2013).  She also has no past medical history of Adverse effect of anesthesia, Aneurysm ([address] Arrhythmia, Asthma, Autoimmune disease ([address] CAD (coronary artery disease), Chronic kidney disease, Chronic obstructive pulmonary disease (HCC), Chronic pain, Coagulation disorder ([address] Difficult intubation, Endocarditis, GERD (gastroesophageal reflux disease), Heart failure ([address] Ill-defined condition, Liver disease, Malignant hyperthermia due to anesthesia, Nausea & vomiting, Nicotine vapor product user, Non-nicotine vapor product user, Pseudocholinesterase deficiency, Psychiatric disorder, PUD (peptic ulcer disease), Rheumatic fever, Seizures ([address] Stroke ([address] Thromboembolus ([address] or Thyroid disease. Ms. Eber Rodriguez  has a past surgical history that includes pr breast surgery procedure unlisted (08/2010); hx cholecystectomy (1981); hx tonsillectomy (~1950); hx tubal ligation (1984); and hx breast lumpectomy (Right, 2007). Social History/Living Environment:   Home Environment: Private residence  Living Alone: No  Support Systems: Spouse/Significant Other/Partner  Patient Expects to be Discharged to[de-identified] Private residence  Current DME Used/Available at Home: Walker, rolling, Wheelchair  Prior Level of Function/Work/Activity:  W/c long distance, walker in home. Assist LB ADL's. Preop ROM was 15-65, post op 0-120. Number of Personal Factors/Comorbidities that affect the Plan of Care: Brief history (0):  LOW COMPLEXITY   ASSESSMENT OF OCCUPATIONAL PERFORMANCE[de-identified]   Most Recent Physical Functioning:   Balance  Sitting: Intact  Standing: Intact; With support                    Coordination  Fine Motor Skills-Upper: Left Intact; Right Intact  Gross Motor Skills-Upper: Left Intact; Right Intact         Mental Status  Neurologic State: Alert  Orientation Level: Appropriate for age;Oriented X4  Cognition: Appropriate decision making; Follows commands                Basic ADLs (From Assessment) Complex ADLs (From Assessment)   Basic ADL  Feeding: Independent  Oral Facial Hygiene/Grooming: Setup  Bathing: Minimum assistance  Type of Bath: Chlorhexidine (CHG), Full, Shower  Upper Body Dressing: Setup  Lower Body Dressing: Moderate assistance  Toileting: Moderate assistance     Grooming/Bathing/Dressing Activities of Daily Living   Grooming  Grooming Assistance: Stand-by assistance(seated on chair)     Upper Body Bathing  Bathing Assistance: Supervision     Lower Body Bathing  Bathing Assistance: Stand-by assistance(seated on shower chair) [address]: Stand-by assistance(elevated seat walker)   Upper Body Dressing Assistance  Dressing Assistance: Supervision Functional Transfers  Bathroom Mobility: Stand-by assistance  Toilet Transfer : Stand-by assistance  Shower Transfer: Stand-by assistance  Cues: Verbal cues provided  Adaptive Equipment: Shower chair with back;Grab bars; Walker (comment)   Lower Body Dressing Assistance  Dressing Assistance: Minimum assistance(shorts and underpants) Bed/Mat Mobility  Supine to Sit: Contact guard assistance  Sit to Stand: Minimum assistance  Stand to Sit: Contact guard assistance  Bed to Chair: Minimum assistance  Scooting: (left up in chair)         Physical Skills Involved:  1. Range of Motion  2. Balance  3. Strength Cognitive Skills Affected (resulting in the inability to perform in a timely and safe manner):  1. [facility]  Psychosocial Skills Affected:  1. WFL    Number of elements that affect the Plan of Care: 1-3:  LOW COMPLEXITY   CLINICAL DECISION MAKING:   [employer] AM-PAC 6 Clicks   Daily Activity Inpatient Short Form  How much help from another person does the patient currently need. .. Total A Lot A Little None   1. Putting on and taking off regular lower body clothing? ? 1   ? 2   ? 3   ? 4   2. Bathing (including washing, rinsing, drying)? ? 1   ? 2   ? 3   ? 4   3. Toileting, which includes using toilet, bedpan or urinal?   ? 1   ? 2   ? 3   ? 4   4. Putting on and taking off regular upper body clothing? ? 1   ? 2   ? 3   ? 4   5. Taking care of personal grooming such as brushing teeth? ? 1   ? 2   ? 3   ? 4   6. Eating meals? ? 1   ? 2   ? 3   ? 4   © 2007, Trustees of [address] [place] [address] [address] [address], under license to [employer]. All rights reserved     Score:  Initial: 18 Most Recent:23(Date: 5/24/2019)    Interpretation of Tool:  Represents activities that are increasingly more difficult (i.e. Bed mobility, Transfers, Gait). Medical Necessity:     · Skilled intervention continues to be required due to deficits listed above. Reason for Services/Other Comments:  · Patient continues to require skilled intervention due to new TKA   · . Use of outcome tool(s) and clinical judgement create a POC that gives a: MODERATE COMPLEXITY            TREATMENT:   (In addition to Assessment/Re-Assessment sessions the following treatments were rendered)     Pre-treatment Symptoms/Complaints:  No complaint of pain  Pain: Initial:      Post Session:       Self Care: (45 min): Procedure(s) (per grid) utilized to improve and/or restore self-care/home management as related to dressing, bathing, toileting and grooming. Required minimal verbal and   cueing to facilitate activities of daily living skills and compensatory activities. Treatment/Session Assessment:     Response to Treatment:  Good, up to shower     Education:  ? Home Exercises  ? Fall Precautions  ? Hip Precautions ? Going Home Video  ? Knee/Hip Prosthesis Review  ? Walker Management/Safety ? Adaptive Equipment as Needed       Interdisciplinary Collaboration:   o Physical Therapist  o Occupational Therapist  o Registered Nurse    After treatment position/precautions:   o Up in chair  o Bed/Chair-wheels locked  o Caregiver at bedside  o Call light within reach  o RN notified     Compliance with Program/Exercises: Compliant all of the time. Recommendations/Intent for next treatment session:  Pt doing well all goals met and will do well at home with support from . Patient will be discharged home with home health PT.  No further Occupational Therapy warranted, will discharge Occupational Therapy services.       Total Treatment Duration:  OT Patient Time In/Time Out  Time In: 1100  Time Out: [address] Randine Phoenix

## 2019-05-24 NOTE — DISCHARGE SUMMARY
[address] [place] [address]  Total Joint Discharge Summary      Patient ID:  Jania Brito  [id number]  89 y.o.  1950    Admit date: 5/23/2019  Discharge date and time: 5-24-19  Admitting Physician: Kim Gloria MD  Surgeon: Same  Admission Diagnoses: Unilateral primary osteoarthritis, left knee [M17.12]  Arthritis of knee, left [M17.12]  Discharge Diagnoses: Principal Problem: Total knee replacement status, left (5/24/2019)    Active Problems:    Essential hypertension, benign (4/30/2013)      Overview: Well controlled on current regimen. Continue meds and f/u in 6 months      Mixed hyperlipidemia (4/30/2013)      Sleep apnea (4/30/2013)      Morbid obesity ([facility] (4/30/2013)      Type 2 diabetes with nephropathy ([facility] (9/18/2018)      Arthritis of knee, left (5/23/2019)      S/P total knee arthroplasty (5/23/2019)                                Perioperative Antibiotics: Ancef 1 to 2 mg was given depending on patient's weight. If allergic to Ancef or due to other indications, patient was given Vancomycin. Hospital Medications given:   [unfilled]  [unfilled]  [unfilled]    Discharge Medications given:  Current Discharge Medication List      START taking these medications    Details   oxyCODONE IR (ROXICODONE) 5 mg immediate release tablet Take 1-2 Tabs by mouth every four (4) hours as needed for Pain for up to 7 days. Max Daily Amount: 60 mg.  Qty: 60 Tab, Refills: 0    Associated Diagnoses: Total knee replacement status, left         CONTINUE these medications which have CHANGED    Details   aspirin delayed-release 81 mg tablet Take 1 Tab by mouth every twelve (12) hours every twelve (12) hours for 30 days. Qty: 60 Tab, Refills: 0         CONTINUE these medications which have NOT CHANGED    Details   insulin NPH (NOVOLIN N NPH U-100 INSULIN) 100 unit/mL injection 26 Units by SubCUTAneous route Before breakfast and dinner.   Qty: 2 Vial, Refills: 11    Associated Diagnoses: Uncontrolled type 2 diabetes mellitus without complication, with long-term current use of insulin ([facility])      lisinopril-hydroCHLOROthiazide (PRINZIDE, ZESTORETIC) 20-12.5 mg per tablet Take 2 Tabs by mouth daily. Qty: 180 Tab, Refills: 3    Associated Diagnoses: Essential hypertension, benign      MAGNI-GUIDE misc Use with insulin, E11.65  Qty: 1 Each, Refills: 1    Associated Diagnoses: Uncontrolled type 2 diabetes mellitus without complication, with long-term current use of insulin ([facility])      Insulin Syringe-Needle U-100 0.5 mL 31 gauge x 5/16\" syrg Use with insulin up to 5 times daily, E11.65  Qty: 500 Syringe, Refills: 3    Associated Diagnoses: Uncontrolled type 2 diabetes mellitus without complication, with long-term current use of insulin ([facility])      metFORMIN ER (GLUCOPHAGE XR) 500 mg tablet Take two twice daily  Qty: 360 Tab, Refills: 1    Associated Diagnoses: Controlled type 2 diabetes mellitus without complication, with long-term current use of insulin ([facility])      glucose blood VI test strips (ONETOUCH VERIO) strip Check glucose 5 times daily, E11.65  Qty: 500 Strip, Refills: 3    Associated Diagnoses: Uncontrolled type 2 diabetes mellitus without complication, with long-term current use of insulin ([facility])      dilTIAZem CD (CARDIZEM CD) 240 mg ER capsule Take 1 Cap by mouth daily. Qty: 90 Cap, Refills: 1    Associated Diagnoses: Essential hypertension, benign      rosuvastatin (CRESTOR) 10 mg tablet Take 1 Tab by mouth nightly.   Qty: 90 Tab, Refills: 1    Associated Diagnoses: Mixed hyperlipidemia      insulin regular (NOVOLIN R REGULAR U-100 INSULN) 100 unit/mL injection 15 units before meals three times daily plus correction 2/50>150, max daily dose 60 units  Qty: 2 Vial, Refills: 11    Associated Diagnoses: Uncontrolled type 2 diabetes mellitus without complication, with long-term current use of insulin ([facility])      lancets (ONE TOUCH DELICA) 33 gauge misc Check glucose 4 times daily, E11.65  Qty: 400 Lancet, Refills: 3 Associated Diagnoses: Uncontrolled type 2 diabetes mellitus without complication, with long-term current use of insulin (HCC)      melatonin tab tablet Take 5 mg by mouth nightly. varicella-zoster recombinant, PF, (SHINGRIX, PF,) 50 mcg/0.5 mL susr injection 0.5mL by IntraMUSCular route once now and then repeat in 2-6 months  Qty: 0.5 mL, Refills: 1    Associated Diagnoses: Medicare annual wellness visit, subsequent         STOP taking these medications       diclofenac (VOLTAREN) 1 % gel Comments:   Reason for Stopping:         diclofenac EC (VOLTAREN) 50 mg EC tablet Comments:   Reason for Stopping:                Additional DVT Prophylaxis:  SOUTH Hose,Plexi-Pulse    Postoperative transfusions:   none  Post Op complications: none    Hemoglobin at discharge:   Lab Results   Component Value Date/Time    HGB 10.0 (L) 05/23/2019 07:41 PM       Wound appears to be healing without any evidence of infection. Physical Therapy started on the day following surgery and progressed to independent ambulation with the aid of a walker. At the time of discharge, able to go up and down stairs and had understanding of precautions needed following surgery.       PT/OT:            Assistive Device: Walker (comment)                Discharged to: home    Discharge instructions:  -Rx pain medication given  - Anticoagulate with: Ecotrin 81 mg PO BID x 4 weeks  -Resume pre hospital diet             -Resume home medications per medical continuation form     -Ambulate with walker, appropriate total joint protocol  -Follow up in office as scheduled       Signed:  ZURI Cotto  5/24/2019  7:30 AM

## 2019-05-24 NOTE — PROGRESS NOTES
May 24, 2019         Post Op day: 1 Day Post-Op     Admit Date: 2019  Admit Diagnosis: Unilateral primary osteoarthritis, left knee [M17.12]  Arthritis of knee, left [M17.12]        Subjective: Doing well, No complaints, No SOB, No Chest Pain, No Nausea or Vomiting     Objective:   Vital Signs are Stable, No Acute Distress, Alert and Oriented, Dressing is Dry,  Neurovascular exam is normal.     Assessment / Plan :  Patient Active Problem List   Diagnosis Code    Hand eczema L30.9    DJD (degenerative joint disease) M19.90    Essential hypertension, benign I10    Mixed hyperlipidemia E78.2    Uncontrolled type 2 diabetes mellitus without complication, with long-term current use of insulin ([facility] [facility] E11.65, Z79.4    Sleep apnea G47.30    Morbid obesity ([facility] E66.01    Routine general medical examination at a health care facility Z00.00    Generalized osteoarthrosis, involving multiple sites M15.9    Breast cancer ([facility] C50.919    Osteoarthritis of left knee M17.12    Elevated serum creatinine R79.89    Abnormal urinalysis R82.90    Type 2 diabetes with nephropathy (HCC) E11.21    Arthritis of knee, left M17.12    S/P total knee arthroplasty Z96.659    Total knee replacement status, left Z96.652      Patient Vitals for the past 8 hrs:   BP Temp Pulse Resp SpO2   19 0415 116/55 96.2 °F (35.7 °C) 70 16 100 %   19 2330 119/57 96.5 °F (35.8 °C) 75 16 97 %    Temp (24hrs), Av.3 °F (36.3 °C), Min:96.2 °F (35.7 °C), Max:98.2 °F (36.8 °C)    Body mass index is 40.34 kg/m². Lab Results   Component Value Date/Time    HGB 10.0 (L) 2019 07:41 PM      Pt seen by and discussed with Supervising Physician   Continue PT  Hospitalist following for medical management, will ask to see for elevated potassium.   Home today vs tomorrow       Signed By: ZURI Carrillo

## 2019-05-24 NOTE — PROGRESS NOTES
Patient in bed. Dressing dry and intact. Patient denies needs at present. Neurovascular status WDL. Palpable pulses. Positive dorsiflexion and plantar flexion. Instructed not to get up by themselves and call for assistance or any needs. Patient verbalized understanding. Call bell within reach. Side rails up x3. Bed low and locked. No distress noted.

## 2019-05-24 NOTE — PROGRESS NOTES
Pt resting quietly in the bed with a family member at the bedside. Dressing to left knee, clean dry and intact. NV status WNL's with strong dorsiflexion and plantar flexion bilaterally. Pt rating pain  6/10,  10 mg oxycodone given PO. Bed in low locked position and call light within reach.

## 2019-05-24 NOTE — PROGRESS NOTES
Problem: Mobility Impaired (Adult and Pediatric)  Goal: *Acute Goals and Plan of Care (Insert Text)  Description  GOALS (1-4 days):  (1.)Ms. Abdias Quezada will move from supine to sit and sit to supine  in bed with STAND BY ASSIST.  (2.)Ms. Abdias Quezada will transfer from bed to chair and chair to bed with STAND BY ASSIST using the least restrictive device. (3.)Ms. Abdias Quezada will ambulate with STAND BY ASSIST for 200 feet with the least restrictive device. (4.)Ms. Abdias Quezada will increase left knee ROM to 5°-80°.  ________________________________________________________________________________________________   Outcome: Progressing Towards Goal    PHYSICAL THERAPY JOINT CAMP TKA: Daily Note and AM 5/24/2019  INPATIENT: Hospital Day: 2  [place] / [place] / Product Type: Medicare /      NAME/AGE/GENDER: Kati Barbosa is a 79 y.o. female   PRIMARY DIAGNOSIS:   [M17.12]   Procedure(s) and Anesthesia Type:     * KNEE ARTHROPLASTY TOTAL/ LEFT/ - Spinal (Left)  ICD-10: Treatment Diagnosis:    · Pain in Left Knee (M25.562)  · Stiffness of Left Knee, Not elsewhere classified (M25.662)  · Difficulty in walking, Not elsewhere classified (R26.2)      ASSESSMENT:     Ms. Abdias Quezada presents with limited ROM and strength following her L TKA. She needs her R knee replaced at some point. She participated well with therapy and will benefit from PT to increase her functional independence prior to discharging home with PT. She stood and stepped to the chair and then performed therex. 5/24 am she is supine upon arrival.  She performs exercises in the bed without increase in pain. She increase her distance using RW with Min A and no LOB. She return to chair with call light near. She hopes to leave this afternoon. This section established at most recent assessment   PROBLEM LIST (Impairments causing functional limitations):  1. Decreased Strength  2. Decreased Transfer Abilities  3.  Decreased Ambulation Ability/Technique  4. Decreased Balance  5. Increased Pain  6. Decreased Flexibility/Joint Mobility   INTERVENTIONS PLANNED: (Benefits and precautions of physical therapy have been discussed with the patient.)  1. Cold  2. bed mobility  3. gait training  4. home exercise program (HEP)  5. Range of Motion: active/assisted/passive  6. Therapeutic Activities  7. therapeutic exercise/strengthening  8. transfer training  9. Group Therapy     TREATMENT PLAN: Frequency/Duration: Follow patient BID for duration of hospital stay to address above goals. Rehabilitation Potential For Stated Goals: Good     RECOMMENDED REHABILITATION/EQUIPMENT: (at time of discharge pending progress): Continue Skilled Therapy and Home Health: Physical Therapy. HISTORY:   History of Present Injury/Illness (Reason for Referral): Admitted for L TKA. Past Medical History/Comorbidities:   Ms. Abdias Quezada  has a past medical history of Breast cancer ([address] (2007), DJD (degenerative joint disease) (04/30/2013), Essential hypertension, benign (04/30/2013), Generalized osteoarthrosis, involving multiple sites (4/30/2013), Hand eczema (4/30/2013), Heart murmur, Mixed hyperlipidemia (04/30/2013), Morbid obesity ([address] (04/30/2013), Routine general medical examination at a health care facility (4/30/2013), Type II or unspecified type diabetes mellitus without mention of complication, not stated as uncontrolled (04/30/2013), and Unspecified sleep apnea (04/30/2013).  She also has no past medical history of Adverse effect of anesthesia, Aneurysm ([address] Arrhythmia, Asthma, Autoimmune disease ([address] CAD (coronary artery disease), Chronic kidney disease, Chronic obstructive pulmonary disease ([address] Chronic pain, Coagulation disorder ([address] Difficult intubation, Endocarditis, GERD (gastroesophageal reflux disease), Heart failure ([address] Ill-defined condition, Liver disease, Malignant hyperthermia due to anesthesia, Nausea & vomiting, Nicotine vapor product user, Non-nicotine vapor product user, Pseudocholinesterase deficiency, Psychiatric disorder, PUD (peptic ulcer disease), Rheumatic fever, Seizures ([facility] Stroke ([facility] Thromboembolus ([facility] or Thyroid disease. Ms. Lu Zacarias  has a past surgical history that includes pr breast surgery procedure unlisted (08/2010); hx cholecystectomy (1981); hx tonsillectomy (~1950); hx tubal ligation (1984); and hx breast lumpectomy (Right, 2007). Social History/Living Environment:   Home Environment: Private residence  Living Alone: No  Support Systems: Spouse/Significant Other/Partner  Patient Expects to be Discharged to[de-identified] Private residence  Current DME Used/Available at Home: Wheelchair, Walker, rolling  Prior Level of Function/Work/Activity:  Ambulated with rolling walker for household distances only and used a wheelchair for community distances. Number of Personal Factors/Comorbidities that affect the Plan of Care: 0: LOW COMPLEXITY   EXAMINATION:   Most Recent Physical Functioning:                            Bed Mobility  Supine to Sit: Contact guard assistance  Scooting: (left up in chair)    Transfers  Sit to Stand: Minimum assistance  Stand to Sit: Contact guard assistance  Bed to Chair: Minimum assistance                   Weight Bearing Status  Left Side Weight Bearing: As tolerated  Distance (ft): 40 Feet (ft)  Ambulation - Level of Assistance: Minimal assistance  Assistive Device: Walker, rolling  Speed/Radha: Slow  Step Length: Right shortened  Stance: Left decreased  Gait Abnormalities: Antalgic;Decreased step clearance  Interventions: Safety awareness training;Verbal cues     Braces/Orthotics: none    Left Knee Cold  Type: Cryocuff      Body Structures Involved:  1. Joints  2. Muscles Body Functions Affected:  1. Movement Related Activities and Participation Affected:  1.  Mobility   Number of elements that affect the Plan of Care: 4+: HIGH COMPLEXITY   CLINICAL PRESENTATION:   Presentation: Stable and uncomplicated: LOW COMPLEXITY   CLINICAL DECISION MAKING:   [facility] [place] [facility] AM-PAC 6 Clicks   Basic Mobility Inpatient Short Form  How much difficulty does the patient currently have. .. Unable A Lot A Little None   1. Turning over in bed (including adjusting bedclothes, sheets and blankets)? ? 1   ? 2   ? 3   ? 4   2. Sitting down on and standing up from a chair with arms ( e.g., wheelchair, bedside commode, etc.)   ? 1   ? 2   ? 3   ? 4   3. Moving from lying on back to sitting on the side of the bed?   ? 1   ? 2   ? 3   ? 4   How much help from another person does the patient currently need. .. Total A Lot A Little None   4. Moving to and from a bed to a chair (including a wheelchair)? ? 1   ? 2   ? 3   ? 4   5. Need to walk in hospital room? ? 1   ? 2   ? 3   ? 4   6. Climbing 3-5 steps with a railing? ? 1   ? 2   ? 3   ? 4   © 2007, Trustees of [facility] [place] [employer], under license to [employer]. All rights reserved     Score:  Initial: 18 Most Recent: X (Date: -- )    Interpretation of Tool:  Represents activities that are increasingly more difficult (i.e. Bed mobility, Transfers, Gait). Medical Necessity:     · Patient is expected to demonstrate progress in strength, range of motion and balance  ·  to increase independence with gait and transfers   · . Reason for Services/Other Comments:  · Patient continues to require skilled intervention due to limited functional independence   · . Use of outcome tool(s) and clinical judgement create a POC that gives a: Clear prediction of patient's progress: LOW COMPLEXITY            TREATMENT:   (In addition to Assessment/Re-Assessment sessions the following treatments were rendered)     Pre-treatment Symptoms/Complaints: I am ready to get out of this bed. Pain: Initial:   Pain Intensity 1: 0(about the same after therapy) 5 Post Session:       Therapeutic Exercise: (15 Minutes):  Exercises per grid below to improve mobility, strength and balance. Required minimal verbal cues to promote proper body alignment. Gait Training (15 Minutes):  Gait training to improve and/or restore physical functioning as related to mobility. Ambulated 40 Feet (ft) with Minimal assistance using a Walker, rolling and minimal Safety awareness training;Verbal cues related to their knee position and motion to promote proper body alignment. Date:  5/23 Date:  5/24   Date:     ACTIVITY/EXERCISE AM PM AM PM AM PM   GROUP THERAPY  ?  ?  ?  ?  ?  ? Ankle Pumps  10 15      Quad Sets  10 15      Gluteal Sets  10 15      Hip ABd/ADduction  10 15      Straight Leg Raises  10 15      Knee Slides  10 15      Short Arc Quads   15      Long Arc Quads         Chair Slides                  B = bilateral; AA = active assistive; A = active; P = passive      Treatment/Session Assessment:     Response to Treatment:  tolerated gait and TK exercises. Education:  ? Home Exercises  ? Fall Precautions  ? Hip Precautions ? D/C Instruction Review  ? Knee/Hip Prosthesis Review  ? Walker Management/Safety ? Adaptive Equipment as Needed       Interdisciplinary Collaboration:   o Physical Therapy Assistant  o Registered Nurse    After treatment position/precautions:   o Up in chair  o Bed/Chair-wheels locked  o Bed in low position  o Call light within reach  o RN notified  o Family at bedside    Compliance with Program/Exercises: Compliant all of the time, Will assess as treatment progresses. Recommendations/Intent for next treatment session:  Treatment next visit will focus on increasing Ms. Santillan's independence with bed mobility, transfers, gait training, strength/ROM exercises, modalities for pain, and patient education.       Total Treatment Duration:  PT Patient Time In/Time Out  Time In: 0815  Time Out: 0845    Nesha Ken, PTA

## 2019-05-25 VITALS
WEIGHT: 235 LBS | SYSTOLIC BLOOD PRESSURE: 106 MMHG | OXYGEN SATURATION: 97 % | HEIGHT: 64 IN | RESPIRATION RATE: 20 BRPM | DIASTOLIC BLOOD PRESSURE: 53 MMHG | HEART RATE: 74 BPM | BODY MASS INDEX: 40.12 KG/M2 | TEMPERATURE: 98.1 F

## 2019-05-25 LAB
GLUCOSE BLD STRIP.AUTO-MCNC: 153 MG/DL (ref 65–100)
POTASSIUM SERPL-SCNC: 5.4 MMOL/L (ref 3.5–5.1)

## 2019-05-25 PROCEDURE — 84132 ASSAY OF SERUM POTASSIUM: CPT

## 2019-05-25 PROCEDURE — 36415 COLL VENOUS BLD VENIPUNCTURE: CPT

## 2019-05-25 PROCEDURE — 97150 GROUP THERAPEUTIC PROCEDURES: CPT

## 2019-05-25 PROCEDURE — 97116 GAIT TRAINING THERAPY: CPT

## 2019-05-25 PROCEDURE — 82962 GLUCOSE BLOOD TEST: CPT

## 2019-05-25 PROCEDURE — 74011250637 HC RX REV CODE- 250/637: Performed by: ORTHOPAEDIC SURGERY

## 2019-05-25 PROCEDURE — 74011636637 HC RX REV CODE- 636/637: Performed by: ORTHOPAEDIC SURGERY

## 2019-05-25 RX ORDER — LISINOPRIL AND HYDROCHLOROTHIAZIDE 12.5; 2 MG/1; MG/1
2 TABLET ORAL DAILY
Qty: 180 TAB | Refills: 3 | Status: SHIPPED
Start: 2019-05-31 | End: 2019-06-06 | Stop reason: ALTCHOICE

## 2019-05-25 RX ADMIN — INSULIN HUMAN 26 UNITS: 100 INJECTION, SUSPENSION SUBCUTANEOUS at 08:13

## 2019-05-25 RX ADMIN — ASPIRIN 81 MG: 81 TABLET ORAL at 08:06

## 2019-05-25 RX ADMIN — OXYCODONE HYDROCHLORIDE 10 MG: 5 TABLET ORAL at 05:58

## 2019-05-25 RX ADMIN — CELECOXIB 200 MG: 200 CAPSULE ORAL at 08:06

## 2019-05-25 RX ADMIN — OXYCODONE HYDROCHLORIDE 10 MG: 5 TABLET ORAL at 10:42

## 2019-05-25 RX ADMIN — ACETAMINOPHEN 1000 MG: 500 TABLET, FILM COATED ORAL at 02:05

## 2019-05-25 RX ADMIN — Medication 1 AMPULE: at 08:06

## 2019-05-25 RX ADMIN — SENNOSIDES AND DOCUSATE SODIUM 2 TABLET: 8.6; 5 TABLET ORAL at 08:06

## 2019-05-25 RX ADMIN — ACETAMINOPHEN 1000 MG: 500 TABLET, FILM COATED ORAL at 05:58

## 2019-05-25 RX ADMIN — DILTIAZEM HYDROCHLORIDE 240 MG: 240 CAPSULE, COATED, EXTENDED RELEASE ORAL at 08:06

## 2019-05-25 RX ADMIN — OXYCODONE HYDROCHLORIDE 10 MG: 5 TABLET ORAL at 02:05

## 2019-05-25 NOTE — DISCHARGE INSTRUCTIONS
[facility]   Patient Discharge Instructions    Harpreet Ag / [id number] : 1950    Admitted 2019 Discharged: 2019     IF YOU HAVE ANY PROBLEMS ONCE YOU ARE AT HOME CALL THE FOLLOWING NUMBERS:   Main office number: [phone]    Take Home Medications     {Medication reconciliation information is now added to the patient's AVS automatically when it is printed. There is no need to use this SmartLink in discharge instructions. Highlight this text and delete it to clear this message}    · It is important that you take the medication exactly as they are prescribed. · Keep your medication in the bottles provided by the pharmacist and keep a list of the medication names, dosages, and times to be taken in your wallet. · Do not take other medications without consulting your doctor. What to do at [address] [place] [address] Ave your prehospital diet. If you have excessive nausea or vomitting call your doctor's office     Home Physical Therapy is arranged. Use rolling walker when walking. Patients who have had a joint replacement should not drive until you are seen for your follow up appointment by Dr. Radha Elder. When to Call    - Call if you have a temperature greater then 101  - Unable to keep food down  - Loose control of your bladder or bowel function  - Are unable to bear any weight   - Need a pain medication refill     Information obtained by :  I understand that if any problems occur once I am at home I am to contact my physician. I understand and acknowledge receipt of the instructions indicated above.                                                                                                                                            Physician's or R.N.'s Signature                                                                  Date/Time Patient or Representative Signature                                                          Date/Time      Patient Education        Total Knee Replacement: What to Expect at Home  Your Recovery    When you leave the hospital, you should be able to move around with a walker or crutches. But you will need someone to help you at home for the next few weeks or until you have more energy and can move around better. If there is no one to help you at home, you may go to a rehabilitation center. You will go home with a bandage and stitches, staples, tissue glue, or tape strips. Change the bandage as your doctor tells you to. If you have stitches or staples, your doctor will remove them 10 to 21 days after your surgery. Glue or tape strips will fall off on their own over time. You may still have some mild pain, and the area may be swollen for 3 to 6 months after surgery. Your knee will continue to improve for 6 to 12 months. You will probably use a walker for 1 to 3 weeks and then use crutches. When you are ready, you can use a cane. You will probably be able to walk on your own in 4 to 8 weeks. You will need to do months of physical rehabilitation (rehab) after a knee replacement. Rehab will help you strengthen the muscles of the knee and help you regain movement. After you recover, your artificial knee will allow you to do normal daily activities with less pain or no pain at all. You may be able to hike, dance, ride a bike, and play golf. Talk to your doctor about whether you can do more strenuous activities. Always tell your caregivers that you have an artificial knee. How long it will take to walk on your own, return to normal activities, and go back to work depends on your health and how well your rehabilitation (rehab) program goes. The better you do with your rehab exercises, the quicker you will get your strength and movement back.   This care sheet gives you a general idea about how long it will take for you to recover. But each person recovers at a different pace. Follow the steps below to get better as quickly as possible. How can you care for yourself at home? Activity    · Rest when you feel tired. You may take a nap, but do not stay in bed all day. When you sit, use a chair with arms. You can use the arms to help you stand up.     · Work with your physical therapist to find the best way to exercise. You may be able to take frequent, short walks using crutches or a walker. What you can do as your knee heals will depend on whether your new knee is cemented or uncemented. You may not be able to do certain things for a while if your new knee is uncemented.     · After your knee has healed enough, you can do more strenuous activities with caution. ? You can golf, but use a golf cart, and do not wear shoes with spikes. ? You can bike on a flat road or on a stationary bike. Avoid biking up hills. ? Your doctor may suggest that you stay away from activities that put stress on your knee. These include tennis or badminton, squash or racquetball, contact sports like football, jumping (such as in basketball), jogging, or running. ? Avoid activities where you might fall. These include horseback riding, skiing, and mountain biking.     · Do not sit for more than 1 hour at a time. Get up and walk around for a while before you sit again. If you must sit for a long time, prop up your leg with a chair or footstool. This will help you avoid swelling.     · Ask your doctor when you can drive again. It may take up to 8 weeks after knee replacement surgery before it is safe for you to drive.     · When you get into a car, sit on the edge of the seat. Then pull in your legs, and turn to face the front.     · You should be able to do many everyday activities 3 to 6 weeks after your surgery. You will probably need to take 4 to 16 weeks off from work. When you can go back to work depends on the type of work you do and how you feel.   · Ask your doctor when it is okay for you to have sex.     · Do not lift anything heavier than 10 pounds and do not lift weights for 12 weeks. Diet    · By the time you leave the hospital, you should be eating your normal diet. If your stomach is upset, try bland, low-fat foods like plain rice, broiled chicken, toast, and yogurt. Your doctor may suggest that you take iron and vitamin supplements.     · Drink plenty of fluids (unless your doctor tells you not to).   · Eat healthy foods, and watch your portion sizes. Try to stay at your ideal weight. Too much weight puts more stress on your new knee.     · You may notice that your bowel movements are not regular right after your surgery. This is common. Try to avoid constipation and straining with bowel movements. You may want to take a fiber supplement every day. If you have not had a bowel movement after a couple of days, ask your doctor about taking a mild laxative. Medicines    · Your doctor will tell you if and when you can restart your medicines. He or she will also give you instructions about taking any new medicines.     · If you take blood thinners, such as warfarin (Coumadin), clopidogrel (Plavix), or aspirin, be sure to talk to your doctor. He or she will tell you if and when to start taking those medicines again. Make sure that you understand exactly what your doctor wants you to do.     · Your doctor may give you a blood-thinning medicine to prevent blood clots. If you take a blood thinner, be sure you get instructions about how to take your medicine safely. Blood thinners can cause serious bleeding problems. This medicine could be in pill form or as a shot (injection). If a shot is necessary, your doctor will tell you how to do this.     · Be safe with medicines. Take pain medicines exactly as directed. ? If the doctor gave you a prescription medicine for pain, take it as prescribed.   ? If you are not taking a prescription pain medicine, ask your doctor if you can take an over-the-counter medicine. ? Plan to take your pain medicine 30 minutes before exercises. It is easier to prevent pain before it starts than to stop it once it has started.     · If you think your pain medicine is making you sick to your stomach:  ? Take your medicine after meals (unless your doctor has told you not to). ? Ask your doctor for a different pain medicine.     · If your doctor prescribed antibiotics, take them as directed. Do not stop taking them just because you feel better. You need to take the full course of antibiotics. Incision care    · If your doctor told you how to care for your cut (incision), follow your doctor's instructions. You will have a dressing over the cut. A dressing helps the incision heal and protects it. Your doctor will tell you how to take care of this.     · If you did not get instructions, follow this general advice:  ? If you have strips of tape on the cut the doctor made, leave the tape on for a week or until it falls off.  ? If you have stitches or staples, your doctor will tell you when to come back to have them removed. ? If you have skin adhesive on the cut, leave it on until it falls off. Skin adhesive is also called glue or liquid stitches. ? Change the bandage every day. ? Wash the area daily with warm water, and pat it dry. Don't use hydrogen peroxide or alcohol. They can slow healing. ? You may cover the area with a gauze bandage if it oozes fluid or rubs against clothing. ? You may shower 24 to 48 hours after surgery. Pat the incision dry. Don't swim or take a bath for the first 2 weeks, or until your doctor tells you it is okay. Exercise    · Your rehab program will give you a number of exercises to do to help you get back your knee's range of motion and strength. Always do them as your therapist tells you. Ice and elevation    · For pain and swelling, put ice or a cold pack on the area for 10 to 20 minutes at a time.  Put a thin cloth between the ice and your skin. Other instructions    · Continue to wear your support stockings as your doctor says. These help to prevent blood clots. The length of time that you will have to wear them depends on your activity level and the amount of swelling.     · You have metal pieces in your knee. These may set off some airport metal detectors. Carry a medical alert card that says you have an artificial joint, just in case. Follow-up care is a key part of your treatment and safety. Be sure to make and go to all appointments, and call your doctor if you are having problems. It's also a good idea to know your test results and keep a list of the medicines you take. When should you call for help? Call 911 anytime you think you may need emergency care. For example, call if:    · You passed out (lost consciousness).     · You have severe trouble breathing.     · You have sudden chest pain and shortness of breath, or you cough up blood.    Call your doctor now or seek immediate medical care if:    · You have signs of infection, such as:  ? Increased pain, swelling, warmth, or redness. ? Red streaks leading from the incision. ? Pus draining from the incision. ? A fever.     · You have signs of a blood clot, such as:  ? Pain in your calf, back of the knee, thigh, or groin. ? Redness and swelling in your leg or groin.     · Your incision comes open and begins to bleed, or the bleeding increases.     · You have pain that does not get better after you take pain medicine.    Watch closely for changes in your health, and be sure to contact your doctor if:    · You do not have a bowel movement after taking a laxative. Where can you learn more? Go to http://analy-leif.info/. Enter R540 in the search box to learn more about \"Total Knee Replacement: What to Expect at Home. \"  Current as of: September 20, 2018  Content Version: 11.9  © 8449-7160 [employer], [facility].  Care instructions adapted under license by [employer] (which disclaims liability or warranty for this information). If you have questions about a medical condition or this instruction, always ask your healthcare professional. [address] any warranty or liability for your use of this information. DISCHARGE SUMMARY from Nurse    PATIENT INSTRUCTIONS:    After general anesthesia or intravenous sedation, for 24 hours or while taking prescription Narcotics:  · Limit your activities  · Do not drive and operate hazardous machinery  · Do not make important personal or business decisions  · Do  not drink alcoholic beverages  · If you have not urinated within 8 hours after discharge, please contact your surgeon on call. Report the following to your surgeon:  · Excessive pain, swelling, redness or odor of or around the surgical area  · Temperature over 100.5  · Nausea and vomiting lasting longer than 4 hours or if unable to take medications  · Any signs of decreased circulation or nerve impairment to extremity: change in color, persistent  numbness, tingling, coldness or increase pain  · Any questions    What to do at Home:  Recommended activity: {discharge activity:12057}, ***    If you experience any of the following symptoms ***, please follow up with ***. *  Please give a list of your current medications to your Primary Care Provider. *  Please update this list whenever your medications are discontinued, doses are      changed, or new medications (including over-the-counter products) are added. *  Please carry medication information at all times in case of emergency situations. These are general instructions for a healthy lifestyle:    No smoking/ No tobacco products/ Avoid exposure to second hand smoke  Surgeon General's Warning:  Quitting smoking now greatly reduces serious risk to your health.     Obesity, smoking, and sedentary lifestyle greatly increases your risk for illness    A healthy diet, regular physical exercise & weight monitoring are important for maintaining a healthy lifestyle    You may be retaining fluid if you have a history of heart failure or if you experience any of the following symptoms:  Weight gain of 3 pounds or more overnight or 5 pounds in a week, increased swelling in our hands or feet or shortness of breath while lying flat in bed. Please call your doctor as soon as you notice any of these symptoms; do not wait until your next office visit. Recognize signs and symptoms of STROKE:    F-face looks uneven    A-arms unable to move or move unevenly    S-speech slurred or non-existent    T-time-call 911 as soon as signs and symptoms begin-DO NOT go       Back to bed or wait to see if you get better-TIME IS BRAIN. Warning Signs of HEART ATTACK     Call 911 if you have these symptoms:   Chest discomfort. Most heart attacks involve discomfort in the center of the chest that lasts more than a few minutes, or that goes away and comes back. It can feel like uncomfortable pressure, squeezing, fullness, or pain.  Discomfort in other areas of the upper body. Symptoms can include pain or discomfort in one or both arms, the back, neck, jaw, or stomach.  Shortness of breath with or without chest discomfort.  Other signs may include breaking out in a cold sweat, nausea, or lightheadedness. Don't wait more than five minutes to call 911 - MINUTES MATTER! Fast action can save your life. Calling 911 is almost always the fastest way to get lifesaving treatment. Emergency Medical Services staff can begin treatment when they arrive -- up to an hour sooner than if someone gets to the hospital by car. The discharge information has been reviewed with the {PATIENT PARENT GUARDIAN:02014}. The {PATIENT PARENT GUARDIAN:09508} verbalized understanding.   Discharge medications reviewed with the {Dishcarge meds reviewed WKPP:76240} and appropriate educational materials and side effects teaching were provided.   ___________________________________________________________________________________________________________________________________

## 2019-05-25 NOTE — PROGRESS NOTES
Care Management Interventions  PCP Verified by CM:  Yes  Transition of Care Consult (CM Consult): [address]: Yes  Physical Therapy Consult: Yes  Current Support Network: Lives with Spouse  Confirm Follow Up Transport: Family  Plan discussed with Pt/Family/Caregiver: Yes  Discharge Location  Discharge Placement: Home with home health

## 2019-05-25 NOTE — PROGRESS NOTES
Oxycodone 10 mg po per pt request for c/o pain. Slept at intervals during shift. No further c/o voiced. No change in NV status noted. Assisted to bathroom,gait steady using walker. Voids QS. Family member at bedside. Call light within reach.

## 2019-05-25 NOTE — PROGRESS NOTES
I have reviewed discharge instructions with the patient. The patient and spouse verbalized understanding. Prescription for oxycodone given and explained. [facility] to follow.

## 2019-05-25 NOTE — PROGRESS NOTES
Problem: Mobility Impaired (Adult and Pediatric)  Goal: *Acute Goals and Plan of Care (Insert Text)  Description  GOALS (1-4 days):  (1.)Ms. Lola Becker will move from supine to sit and sit to supine  in bed with STAND BY ASSIST.  (2.)Ms. Lola Becker will transfer from bed to chair and chair to bed with STAND BY ASSIST using the least restrictive device. (3.)Ms. Lola Becker will ambulate with STAND BY ASSIST for 200 feet with the least restrictive device. (4.)Ms. Lola Becker will increase left knee ROM to 5°-80°.  ________________________________________________________________________________________________   Outcome: Progressing Towards Goal    PHYSICAL THERAPY JOINT CAMP TKA: Daily Note and AM 5/25/2019  INPATIENT: Hospital Day: 3  [place] / [place] / Product Type: Medicare /      NAME/AGE/GENDER: Ximena Mixon is a 79 y.o. female   PRIMARY DIAGNOSIS:   [M17.12]   Procedure(s) and Anesthesia Type:     * KNEE ARTHROPLASTY TOTAL/ LEFT/ - Spinal (Left)  ICD-10: Treatment Diagnosis:    · Pain in Left Knee (M25.562)  · Stiffness of Left Knee, Not elsewhere classified (M25.662)  · Difficulty in walking, Not elsewhere classified (R26.2)      ASSESSMENT:     Ms. Lola Becker is sitting up in chair on arrival. She is agreeable to PT and plans to go home today with HHPT. Pt is doing well with mobility and exercises. This section established at most recent assessment   PROBLEM LIST (Impairments causing functional limitations):  1. Decreased Strength  2. Decreased Transfer Abilities  3. Decreased Ambulation Ability/Technique  4. Decreased Balance  5. Increased Pain  6. Decreased Flexibility/Joint Mobility   INTERVENTIONS PLANNED: (Benefits and precautions of physical therapy have been discussed with the patient.)  1. Cold  2. bed mobility  3. gait training  4. home exercise program (HEP)  5. Range of Motion: active/assisted/passive  6.  Therapeutic Activities  7. therapeutic exercise/strengthening  8. transfer training  9. Group Therapy     TREATMENT PLAN: Frequency/Duration: Follow patient BID for duration of hospital stay to address above goals. Rehabilitation Potential For Stated Goals: Good     RECOMMENDED REHABILITATION/EQUIPMENT: (at time of discharge pending progress): Continue Skilled Therapy and Home Health: Physical Therapy. HISTORY:   History of Present Injury/Illness (Reason for Referral): Admitted for L TKA. Past Medical History/Comorbidities:   Ms. Estiven Block  has a past medical history of Breast cancer ([address] (2007), DJD (degenerative joint disease) (04/30/2013), Essential hypertension, benign (04/30/2013), Generalized osteoarthrosis, involving multiple sites (4/30/2013), Hand eczema (4/30/2013), Heart murmur, Mixed hyperlipidemia (04/30/2013), Morbid obesity ([address] (04/30/2013), Routine general medical examination at a health care facility (4/30/2013), Type II or unspecified type diabetes mellitus without mention of complication, not stated as uncontrolled (04/30/2013), and Unspecified sleep apnea (04/30/2013). She also has no past medical history of Adverse effect of anesthesia, Aneurysm ([address] Arrhythmia, Asthma, Autoimmune disease ([address] CAD (coronary artery disease), Chronic kidney disease, Chronic obstructive pulmonary disease ([address] Chronic pain, Coagulation disorder ([address] Difficult intubation, Endocarditis, GERD (gastroesophageal reflux disease), Heart failure ([address] Ill-defined condition, Liver disease, Malignant hyperthermia due to anesthesia, Nausea & vomiting, Nicotine vapor product user, Non-nicotine vapor product user, Pseudocholinesterase deficiency, Psychiatric disorder, PUD (peptic ulcer disease), Rheumatic fever, Seizures ([address] Stroke ([address] Thromboembolus ([address] or Thyroid disease.   Ms. Estiven Block  has a past surgical history that includes pr breast surgery procedure unlisted (08/2010); hx cholecystectomy (1981); hx tonsillectomy (~1950); hx tubal ligation (1984); and hx breast lumpectomy (Right, ). Social History/Living Environment:   Home Environment: Private residence  One/Two Story Residence: One story  Living Alone: No  Support Systems: Spouse/Significant Other/Partner  Patient Expects to be Discharged to[de-identified] Private residence  Current DME Used/Available at Home: Walker, rolling, Wheelchair  Prior Level of Function/Work/Activity:  Ambulated with rolling walker for household distances only and used a wheelchair for community distances. Number of Personal Factors/Comorbidities that affect the Plan of Care: 0: LOW COMPLEXITY   EXAMINATION:   Most Recent Physical Functioning:                  LLE PROM  L Knee Flexion: 78  L Knee Extension: 10              Transfers  Sit to Stand: Stand-by assistance  Stand to Sit: Stand-by assistance  Bed to Chair: Stand-by assistance                   Weight Bearing Status  Left Side Weight Bearing: As tolerated  Distance (ft): 104 Feet (ft)  Ambulation - Level of Assistance: Stand-by assistance  Assistive Device: Walker, rolling  Speed/Radha: Delayed;Pace decreased (<100 feet/min)  Step Length: Left shortened;Right shortened  Stance: Left decreased  Gait Abnormalities: Antalgic;Decreased step clearance  Interventions: Safety awareness training;Verbal cues     Braces/Orthotics: none    Left Knee Cold  Type: Cryocuff      Body Structures Involved:  1. Joints  2. Muscles Body Functions Affected:  1. Movement Related Activities and Participation Affected:  1. Mobility   Number of elements that affect the Plan of Care: 4+: HIGH COMPLEXITY   CLINICAL PRESENTATION:   Presentation: Stable and uncomplicated: LOW COMPLEXITY   CLINICAL DECISION MAKIN [facility] [address] [id number] AM-PAC 6 Clicks   Basic Mobility Inpatient Short Form  How much difficulty does the patient currently have. .. Unable A Lot A Little None   1. Turning over in bed (including adjusting bedclothes, sheets and blankets)? ? 1   ? 2   ? 3   ? 4   2.   Sitting down on and standing up from a chair with arms ( e.g., wheelchair, bedside commode, etc.)   ? 1   ? 2   ? 3   ? 4   3. Moving from lying on back to sitting on the side of the bed?   ? 1   ? 2   ? 3   ? 4   How much help from another person does the patient currently need. .. Total A Lot A Little None   4. Moving to and from a bed to a chair (including a wheelchair)? ? 1   ? 2   ? 3   ? 4   5. Need to walk in hospital room? ? 1   ? 2   ? 3   ? 4   6. Climbing 3-5 steps with a railing? ? 1   ? 2   ? 3   ? 4   © 2007, Trustees of [facility] [employer], under license to [employer]. All rights reserved     Score:  Initial: 18 Most Recent: X (Date: -- )    Interpretation of Tool:  Represents activities that are increasingly more difficult (i.e. Bed mobility, Transfers, Gait). Medical Necessity:     · Patient is expected to demonstrate progress in strength, range of motion and balance  ·  to increase independence with gait and transfers   · . Reason for Services/Other Comments:  · Patient continues to require skilled intervention due to limited functional independence   · . Use of outcome tool(s) and clinical judgement create a POC that gives a: Clear prediction of patient's progress: LOW COMPLEXITY            TREATMENT:   (In addition to Assessment/Re-Assessment sessions the following treatments were rendered)     Pre-treatment Symptoms/Complaints:  Minimal complaints of pain  Pain: Initial:  Not rated     Post Session:  Not rated     Therapeutic Exercise: (45 Minutes(group)):  Exercises per grid below to improve mobility, strength and balance. Required minimal verbal cues to promote proper body alignment. Gait Training (15 Minutes):  Gait training to improve and/or restore physical functioning as related to mobility. Ambulated 104 Feet (ft) with Stand-by assistance using a Walker, rolling and minimal Safety awareness training;Verbal cues related to their knee position and motion to promote proper body alignment.        Date:  5/23 Date:  5/24   Date:  5/25/19   ACTIVITY/EXERCISE AM PM AM PM AM PM   GROUP THERAPY  ? ?  ?  x  x  ? Ankle Pumps  10 15 15 20    Quad Sets  10 15 15 20    Gluteal Sets  10 15 15 20    Hip ABd/ADduction  10 15 15 20    Straight Leg Raises  10 15 15 20    Knee Slides  10 15 15 20    Short Arc Quads   15 15 20    Long Arc Quads         Chair Slides    15 20             B = bilateral; AA = active assistive; A = active; P = passive      Treatment/Session Assessment:     Response to Treatment:   Pt doing better with mobility     Education:  ? Home Exercises  ? Fall Precautions  ? Hip Precautions ? D/C Instruction Review  X Knee/Hip Prosthesis Review  ? Walker Management/Safety ? Adaptive Equipment as Needed       Interdisciplinary Collaboration:   o Physical Therapy Assistant  o Registered Nurse    After treatment position/precautions:   o Up in chair  o Bed/Chair-wheels locked  o Bed in low position  o Caregiver at bedside  o Call light within reach  o RN notified    Compliance with Program/Exercises: Compliant all of the time, Will assess as treatment progresses. Recommendations/Intent for next treatment session:   Pt to be discharged home today.        Total Treatment Duration:  PT Patient Time In/Time Out  Time In: 0930  Time Out: 110 DO Germain

## 2019-05-25 NOTE — PROGRESS NOTES
May 25, 2019         Post Op day: 2 Days Post-Op     Admit Date: 2019  Admit Diagnosis: Unilateral primary osteoarthritis, left knee [M17.12]  Arthritis of knee, left [M17.12]        Subjective: Patient is status-post Procedure(s) (LRB):  KNEE ARTHROPLASTY TOTAL/ LEFT/ (Left). Patient doing well. No concerns/complaints. No shortness of breath, chest pain or nausea/vomiting. Objective:   Visit Vitals  /49 (BP 1 Location: Right arm, BP Patient Position: At rest)   Pulse 79   Temp 97.3 °F (36.3 °C)   Resp 20   Ht 5' 4\" (1.626 m)   Wt 106.6 kg (235 lb)   SpO2 96%   BMI 40.34 kg/m²    Temp (24hrs), Av.4 °F (36.3 °C), Min:96 °F (35.6 °C), Max:98.2 °F (36.8 °C)    Lab Results   Component Value Date/Time    HGB 10.0 (L) 2019 07:41 PM     Extremity Exam  Dressing Clean, dry, intact.   Neuro intact and unchanged left extremity  Sensation intact to light touch  Extremity perfused  No sign of DVT     Assessment / Plan :  S/p Procedure(s) (LRB):  KNEE ARTHROPLASTY TOTAL/ LEFT/ (Left)  Continue current postoperative plan  PT/OT-Continue current weightbearing status and restrictions of involved extremities  Continue current VTE prophylaxis   Hospitalist monitoring hyperkalemia  DIspo-  Patient Active Problem List   Diagnosis Code    Hand eczema L30.9    DJD (degenerative joint disease) M19.90    Essential hypertension, benign I10    Mixed hyperlipidemia E78.2    Uncontrolled type 2 diabetes mellitus without complication, with long-term current use of insulin ([address] E11.65, Z79.4    Sleep apnea G47.30    Morbid obesity ([address] E66.01    Routine general medical examination at a health care facility Z00.00    Generalized osteoarthrosis, involving multiple sites M15.9    Breast cancer ([address] C50.919    Osteoarthritis of left knee M17.12    Elevated serum creatinine R79.89    Abnormal urinalysis R82.90    Type 2 diabetes with nephropathy (HCC) E11.21    Arthritis of knee, left M17.12    S/P total knee arthroplasty Z96.659    Total knee replacement status, left Z96.652          Signed By: Blaise Guajardo NP

## 2019-05-25 NOTE — PROGRESS NOTES
Hospitalist Progress Note    Patient: Emmanuel Lei MRN: [id number]  SSN: [id number]    YOB: 1950  Age: 79 y.o. Sex: female      Admit Date: 5/23/2019    LOS: 2 days     Subjective:     79 y.o. female with a PMH of HTN, IDDM2, HLD, and arthritis s/p left TKA who is being seen for diabetes and HTN management. She had a left TKA and is doing well post-op. There were no barry-operative complications. EBL 150ml. She is doing well. She complains of 4/10 left knee pain. Denies CP/SOB. Denies F/C/N/V.    5/25 - I saw the patient in rehab. She feels good today. Pain minimal. Denies CP/SOB/palpitations. Review of systems negative except stated above. Objective:     Visit Vitals  /53 (BP 1 Location: Right arm, BP Patient Position: At rest)   Pulse 74   Temp 98.1 °F (36.7 °C)   Resp 20   Ht 5' 4\" (1.626 m)   Wt 106.6 kg (235 lb)   SpO2 97%   BMI 40.34 kg/m²      Oxygen Therapy  O2 Sat (%): 97 % (05/25/19 0713)  Pulse via Oximetry: 73 beats per minute (05/23/19 1507)  O2 Device: Nasal cannula (05/23/19 1507)  O2 Flow Rate (L/min): 1.5 l/min (05/23/19 1507)      Intake and Output:     Intake/Output Summary (Last 24 hours) at 5/25/2019 0956  Last data filed at 5/24/2019 1932  Gross per 24 hour   Intake 720 ml   Output --   Net 720 ml         Physical Exam:   GENERAL: alert, cooperative, no distress, appears stated age  EYE: conjunctivae/corneas clear. PERRL. THROAT & NECK: normal and no erythema or exudates noted. LUNG: clear to auscultation bilaterally  HEART: regular rate and rhythm, S1S2, no murmur, no JVD  ABDOMEN: soft, non-tender, non-distended. Bowel sounds normal.   EXTREMITIES:  No edema, 2+ pedal/radial pulses bilaterally  SKIN: no rash or abnormalities  NEUROLOGIC: A&Ox3. Cranial nerves 2-12 grossly intact.     Lab/Data Review:  Recent Results (from the past 24 hour(s))   GLUCOSE, POC    Collection Time: 05/24/19 12:00 PM   Result Value Ref Range    Glucose (POC) 214 (H) 65 - 100 mg/dL POTASSIUM    Collection Time: 05/24/19  1:08 PM   Result Value Ref Range    Potassium 5.9 (H) 3.5 - 5.1 mmol/L   EKG, 12 LEAD, INITIAL    Collection Time: 05/24/19  1:51 PM   Result Value Ref Range    Ventricular Rate 80 BPM    Atrial Rate 80 BPM    P-R Interval 198 ms    QRS Duration 86 ms    Q-T Interval 340 ms    QTC Calculation (Bezet) 392 ms    Calculated P Axis 41 degrees    Calculated R Axis 21 degrees    Calculated T Axis 41 degrees    Diagnosis       Sinus rhythm with marked sinus arrhythmia  Otherwise normal ECG  When compared with ECG of 27-AUG-2018 08:54,  No significant change was found  Confirmed by Fara Gamboa ([id number]) on 5/24/2019 2:08:05 PM     GLUCOSE, POC    Collection Time: 05/24/19  3:08 PM   Result Value Ref Range    Glucose (POC) 196 (H) 65 - 100 mg/dL   GLUCOSE, POC    Collection Time: 05/24/19  7:39 PM   Result Value Ref Range    Glucose (POC) 156 (H) 65 - 100 mg/dL   GLUCOSE, POC    Collection Time: 05/25/19  6:37 AM   Result Value Ref Range    Glucose (POC) 153 (H) 65 - 100 mg/dL   POTASSIUM    Collection Time: 05/25/19  8:04 AM   Result Value Ref Range    Potassium 5.4 (H) 3.5 - 5.1 mmol/L       Imaging:  No results found. No results found for this visit on 05/23/19. Cultures:   All Micro Results     Procedure Component Value Units Date/Time    CULTURE, URINE [[id number]] Collected:  05/23/19 1200    Order Status:  Completed Specimen:  Urine from Maya Specimen Updated:  05/25/19 0849     Special Requests: NO SPECIAL REQUESTS        Culture result: NO GROWTH 1 DAY             Assessment/Plan:     Principal Problem:    Arthritis of knee, left (5/23/2019)  - Per primary team  - PT/OT  - Pain control     Active Problems:    S/P total knee arthroplasty (5/23/2019)  - Per primary team  - PT/OT  - Pain control      Acute Hyperkalemia  - Potassium 6.3 --> 6.4 --> 5.4  - 12 lead EKG normal  - Stop HCTZ/Lisinopril  - Will need repeat check later in the week by her PCP       Essential hypertension, benign (4/30/2013)  - No acute issues  - Continue Cardizem  - Hold Zestoretic       Mixed hyperlipidemia (4/30/2013)  - Continue Crestor       Sleep apnea (4/30/2013)  - Continue home CPAP       Morbid obesity ([facility] (4/30/2013)       Type 2 diabetes with nephropathy ([facility] (9/18/2018)  - A1C 7.1  - Continue Insulin NPH 26U BID  - Continue Novolin SSI  - Continue Metformin     Thank you for allowing us to participate in the care of this patient. Her potassium is improved with no EKG changes. She is safe for discharge from a medical perspective. She should howl her HCTZ/Lisinopril until a follow up recheck with her PCP or [facility] later this week.     Signed By: Julee Morales DO     May 25, 2019

## 2019-05-25 NOTE — PROGRESS NOTES
Sitting up in recliner,Family member at bedside. Dsng dry and intact. Neurovascular status remains WDL. Iceman cooling system in use. Call light within reach.

## 2019-05-25 NOTE — PROGRESS NOTES
Lying quietly with eyes closed. RR even and unlabored. No apparent distress noted. No change in NV status noted.

## 2019-05-26 ENCOUNTER — HOME CARE VISIT (OUTPATIENT)
Dept: SCHEDULING | Facility: HOME HEALTH | Age: 69
End: 2019-05-26
Payer: MEDICARE

## 2019-05-26 LAB
BACTERIA SPEC CULT: NORMAL
SERVICE CMNT-IMP: NORMAL

## 2019-05-26 PROCEDURE — 3331090001 HH PPS REVENUE CREDIT

## 2019-05-26 PROCEDURE — 3331090002 HH PPS REVENUE DEBIT

## 2019-05-26 PROCEDURE — 400013 HH SOC

## 2019-05-26 PROCEDURE — G0151 HHCP-SERV OF PT,EA 15 MIN: HCPCS

## 2019-05-27 VITALS
HEART RATE: 93 BPM | RESPIRATION RATE: 18 BRPM | TEMPERATURE: 97.7 F | SYSTOLIC BLOOD PRESSURE: 132 MMHG | DIASTOLIC BLOOD PRESSURE: 70 MMHG | OXYGEN SATURATION: 98 %

## 2019-05-27 PROCEDURE — 3331090001 HH PPS REVENUE CREDIT

## 2019-05-27 PROCEDURE — 3331090002 HH PPS REVENUE DEBIT

## 2019-05-28 ENCOUNTER — PATIENT OUTREACH (OUTPATIENT)
Dept: CASE MANAGEMENT | Age: 69
End: 2019-05-28

## 2019-05-28 ENCOUNTER — HOME CARE VISIT (OUTPATIENT)
Dept: SCHEDULING | Facility: HOME HEALTH | Age: 69
End: 2019-05-28
Payer: MEDICARE

## 2019-05-28 VITALS
HEART RATE: 80 BPM | TEMPERATURE: 97.2 F | DIASTOLIC BLOOD PRESSURE: 62 MMHG | RESPIRATION RATE: 17 BRPM | SYSTOLIC BLOOD PRESSURE: 120 MMHG

## 2019-05-28 PROCEDURE — G0157 HHC PT ASSISTANT EA 15: HCPCS

## 2019-05-28 PROCEDURE — 3331090001 HH PPS REVENUE CREDIT

## 2019-05-28 PROCEDURE — 3331090002 HH PPS REVENUE DEBIT

## 2019-05-28 NOTE — PROGRESS NOTES
This note will not be viewable in [address] Transition of Care Discharge Follow-up Questionnaire   Date/Time of Call:   5/28/19   10:48am   What was the patient hospitalized for? Total knee replacement status, left     Does the patient understand his/her diagnosis and/or treatment and what happened during the hospitalization? Yes, spoke with patient, she states understanding of diagnosis and treatment; and is agreeable to call. Patient states she is doing well   Did the patient receive discharge instructions? Yes    CM Assessed Risk for Readmission:       Patient stated Risk for Readmission:      Low r/t diagnosis, comorbidities and/or complications of surgery     None stated   Review any discharge instructions ([facility] Ask patient if they understand these. Do they have any questions? Reviewed, understanding is stated, no questions at this time       Were home services ordered (nursing, PT, OT, ST, etc.)? Yes, [facility] PT      If so, has the first visit occurred? If not, why? (Assist with coordination of services if necessary.)   Yes    Was any DME ordered? Yes, [facility] ([facility])     If so, has it been received? If not, why?  (Assist patient in obtaining DME orders &/or equipment if necessary.) yes   Complete a review of all medications (new, continued and discontinued meds per the D/C instructions and medication tab in [facility]). Completed  START taking:  oxyCODONE IR 5 mg immediate release  tablet (ROXICODONE)  CHANGE how you take:  aspirin delayed-release 81 mg tablet  STOP taking:  diclofenac 1 % Gel (VOLTAREN)  diclofenac EC 50 mg EC tablet (VOLTAREN)   Were all new prescriptions filled? If not, why?  (Assist patient in obtaining medications if necessary  escalate for CCM &/or SW if ongoing issues are verbalized by pt or anticipated)   Yes    Does the patient understand the purpose and dosing instructions for all medications?   (If patient has questions, provide explanation and education.)   Yes   Does the patient have any problems in performing ADLs? (If patient is unable to perform ADLs  what is the limiting factor(s)? Do they have a support system that can assist? If no support system is present, discuss possible assistance that they may be able to obtain. Escalate for CCM/SW if ongoing issues are verbalized by pt or anticipated)   Independent with ADLs   Does the patient have all follow-up appointments scheduled? 7 day f/up with PCP?   (f/up with PCP may be w/in 14 days if patient has a f/up with their specialist w/in 7 days)    7-14 day f/up with specialist?   (or per discharge instructions)    If f/up has not been made  what actions has the care coordinator made to accomplish this? Has transportation been arranged? Yes        Fayne Duverney, NP  as needed per discharge summary; patient has routine PE appointment 6/5/19    Dr. Jeri Ibarra ortho 6/26/19              Yes, there are no transportation needs at this time   Any other questions or concerns expressed by the patient? No other needs or concerns identified. Patient states her gratitude for follow up. Contact information for [facility] was given, instructed to call with new questions or concerns. Schedule next appointment with JOSH IVERSON Coordinator or refer to RN Case Manager/ per the workflow guidelines. When is care coordinators next follow-up call scheduled? If referred for CCM  what RN care manager was the referral assigned? Community Care Coordinator will follow per workflow guidelines.           Within 30 days   OZZY Call Completed By: Vivian Horton, [address] [place] [address] Coordinator

## 2019-05-29 PROCEDURE — 3331090001 HH PPS REVENUE CREDIT

## 2019-05-29 PROCEDURE — 3331090002 HH PPS REVENUE DEBIT

## 2019-05-30 ENCOUNTER — HOME CARE VISIT (OUTPATIENT)
Dept: SCHEDULING | Facility: HOME HEALTH | Age: 69
End: 2019-05-30
Payer: MEDICARE

## 2019-05-30 PROCEDURE — G0157 HHC PT ASSISTANT EA 15: HCPCS

## 2019-05-30 PROCEDURE — 3331090001 HH PPS REVENUE CREDIT

## 2019-05-30 PROCEDURE — 3331090002 HH PPS REVENUE DEBIT

## 2019-05-31 PROCEDURE — 3331090001 HH PPS REVENUE CREDIT

## 2019-05-31 PROCEDURE — 3331090002 HH PPS REVENUE DEBIT

## 2019-06-01 VITALS
HEART RATE: 90 BPM | TEMPERATURE: 97.2 F | RESPIRATION RATE: 17 BRPM | SYSTOLIC BLOOD PRESSURE: 102 MMHG | DIASTOLIC BLOOD PRESSURE: 64 MMHG

## 2019-06-01 PROCEDURE — 3331090002 HH PPS REVENUE DEBIT

## 2019-06-01 PROCEDURE — 3331090001 HH PPS REVENUE CREDIT

## 2019-06-02 PROCEDURE — 3331090001 HH PPS REVENUE CREDIT

## 2019-06-02 PROCEDURE — 3331090002 HH PPS REVENUE DEBIT

## 2019-06-03 ENCOUNTER — HOME CARE VISIT (OUTPATIENT)
Dept: SCHEDULING | Facility: HOME HEALTH | Age: 69
End: 2019-06-03
Payer: MEDICARE

## 2019-06-03 PROCEDURE — 3331090001 HH PPS REVENUE CREDIT

## 2019-06-03 PROCEDURE — 3331090002 HH PPS REVENUE DEBIT

## 2019-06-03 PROCEDURE — G0157 HHC PT ASSISTANT EA 15: HCPCS

## 2019-06-04 VITALS
SYSTOLIC BLOOD PRESSURE: 122 MMHG | RESPIRATION RATE: 17 BRPM | DIASTOLIC BLOOD PRESSURE: 70 MMHG | HEART RATE: 90 BPM | TEMPERATURE: 97 F

## 2019-06-04 PROCEDURE — 3331090002 HH PPS REVENUE DEBIT

## 2019-06-04 PROCEDURE — 3331090001 HH PPS REVENUE CREDIT

## 2019-06-05 PROCEDURE — 3331090002 HH PPS REVENUE DEBIT

## 2019-06-05 PROCEDURE — 3331090001 HH PPS REVENUE CREDIT

## 2019-06-06 PROCEDURE — 3331090001 HH PPS REVENUE CREDIT

## 2019-06-06 PROCEDURE — 3331090002 HH PPS REVENUE DEBIT

## 2019-06-07 ENCOUNTER — HOME CARE VISIT (OUTPATIENT)
Dept: SCHEDULING | Facility: HOME HEALTH | Age: 69
End: 2019-06-07
Payer: MEDICARE

## 2019-06-07 VITALS
DIASTOLIC BLOOD PRESSURE: 84 MMHG | HEART RATE: 82 BPM | SYSTOLIC BLOOD PRESSURE: 152 MMHG | RESPIRATION RATE: 18 BRPM | TEMPERATURE: 98.5 F

## 2019-06-07 PROCEDURE — G0151 HHCP-SERV OF PT,EA 15 MIN: HCPCS

## 2019-06-07 PROCEDURE — 3331090001 HH PPS REVENUE CREDIT

## 2019-06-07 PROCEDURE — 3331090002 HH PPS REVENUE DEBIT

## 2019-06-08 PROCEDURE — 3331090002 HH PPS REVENUE DEBIT

## 2019-06-08 PROCEDURE — 3331090001 HH PPS REVENUE CREDIT

## 2019-06-09 PROCEDURE — 3331090002 HH PPS REVENUE DEBIT

## 2019-06-09 PROCEDURE — 3331090001 HH PPS REVENUE CREDIT

## 2019-06-10 ENCOUNTER — HOME CARE VISIT (OUTPATIENT)
Dept: SCHEDULING | Facility: HOME HEALTH | Age: 69
End: 2019-06-10
Payer: MEDICARE

## 2019-06-10 VITALS
DIASTOLIC BLOOD PRESSURE: 70 MMHG | TEMPERATURE: 98.3 F | RESPIRATION RATE: 16 BRPM | HEART RATE: 70 BPM | SYSTOLIC BLOOD PRESSURE: 136 MMHG

## 2019-06-10 PROCEDURE — 3331090002 HH PPS REVENUE DEBIT

## 2019-06-10 PROCEDURE — 3331090001 HH PPS REVENUE CREDIT

## 2019-06-10 PROCEDURE — G0151 HHCP-SERV OF PT,EA 15 MIN: HCPCS

## 2019-06-11 PROCEDURE — 3331090002 HH PPS REVENUE DEBIT

## 2019-06-11 PROCEDURE — 3331090001 HH PPS REVENUE CREDIT

## 2019-06-11 PROCEDURE — A4649 SURGICAL SUPPLIES: HCPCS

## 2019-06-12 ENCOUNTER — HOME CARE VISIT (OUTPATIENT)
Dept: SCHEDULING | Facility: HOME HEALTH | Age: 69
End: 2019-06-12
Payer: MEDICARE

## 2019-06-12 PROCEDURE — 3331090002 HH PPS REVENUE DEBIT

## 2019-06-12 PROCEDURE — G0157 HHC PT ASSISTANT EA 15: HCPCS

## 2019-06-12 PROCEDURE — 3331090001 HH PPS REVENUE CREDIT

## 2019-06-13 PROCEDURE — 3331090002 HH PPS REVENUE DEBIT

## 2019-06-13 PROCEDURE — 3331090001 HH PPS REVENUE CREDIT

## 2019-06-14 VITALS
HEART RATE: 80 BPM | SYSTOLIC BLOOD PRESSURE: 120 MMHG | RESPIRATION RATE: 18 BRPM | DIASTOLIC BLOOD PRESSURE: 70 MMHG | TEMPERATURE: 97.7 F

## 2019-06-14 PROCEDURE — 3331090002 HH PPS REVENUE DEBIT

## 2019-06-14 PROCEDURE — 3331090001 HH PPS REVENUE CREDIT

## 2019-06-15 PROCEDURE — 3331090001 HH PPS REVENUE CREDIT

## 2019-06-15 PROCEDURE — 3331090002 HH PPS REVENUE DEBIT

## 2019-06-16 PROCEDURE — 3331090001 HH PPS REVENUE CREDIT

## 2019-06-16 PROCEDURE — 3331090002 HH PPS REVENUE DEBIT

## 2019-06-17 ENCOUNTER — HOME CARE VISIT (OUTPATIENT)
Dept: SCHEDULING | Facility: HOME HEALTH | Age: 69
End: 2019-06-17
Payer: MEDICARE

## 2019-06-17 VITALS
RESPIRATION RATE: 18 BRPM | DIASTOLIC BLOOD PRESSURE: 84 MMHG | HEART RATE: 74 BPM | TEMPERATURE: 98.2 F | SYSTOLIC BLOOD PRESSURE: 150 MMHG

## 2019-06-17 PROCEDURE — 3331090001 HH PPS REVENUE CREDIT

## 2019-06-17 PROCEDURE — G0151 HHCP-SERV OF PT,EA 15 MIN: HCPCS

## 2019-06-17 PROCEDURE — 3331090002 HH PPS REVENUE DEBIT

## 2019-06-19 ENCOUNTER — HOSPITAL ENCOUNTER (OUTPATIENT)
Dept: PHYSICAL THERAPY | Age: 69
Discharge: HOME | End: 2019-06-19
Payer: MEDICARE

## 2019-06-19 ENCOUNTER — PATIENT OUTREACH (OUTPATIENT)
Dept: CASE MANAGEMENT | Age: 69
End: 2019-06-19

## 2019-06-19 PROCEDURE — 97535 SELF CARE MNGMENT TRAINING: CPT

## 2019-06-19 PROCEDURE — 97162 PT EVAL MOD COMPLEX 30 MIN: CPT

## 2019-06-19 PROCEDURE — 97110 THERAPEUTIC EXERCISES: CPT

## 2019-06-19 NOTE — PROGRESS NOTES
Gwendolyn Maurice  : 1950  [place]  Secondary: [place] @ [address], [place] Silverio.  [phone]   [phone]      OUTPATIENT PHYSICAL THERAPY: Daily Treatment Note  2019   Therapy Diagnosis: L TKA  _______________________________________                                                                                                Effective Dates: 2019 TO 2019 (90 days). Frequency/Duration: 2-3 times a week for 90 Days  GOALS: (Goals have been discussed and agreed upon with patient.)  Short-Term Functional Goals: Time Frame: 4 weeks   1. Ms. Jamie Hole to be independent with HEP. 2. Pt able to perform PROM L knee 0-120 ° to return to functional mobility. 3. Pt able to perform functional activities and ambulation without needing the SPC and without increase in pain in the L knee. Discharge Goals: Time Frame: 12 weeks   1. Pt to show improvement in strength to 4+/5 overall in the L knee to return to functional activities without limitations. 2. Pt able to demo full AROM in the L knee painfree to 0-120 ° to have full mobility for all activities. Pt able to perform all exercises and daily walking program painfree in the L knee to prepare for the R knee in the fall. __________________________________  Pre-treatment Symptoms/Complaints: \"Im doing well. I just want to do this and get ready for the R one.\"   Pain: Initial: 2/10 Post Session:  2/10   Medications Last Reviewed:  2019    Next MD appt: clayton     Updated Objective Findings:  See evaluation note from today     TREATMENT:   THERAPEUTIC ACTIVITY: ( see chart below for minutes): Therapeutic activities per grid below to improve mobility and strength. Required minimal visual and verbal cues to promote dynamic balance in standing.   THERAPEUTIC EXERCISE: (see chart below for minutes):  Exercises per grid below to improve mobility, strength, balance and coordination. Required minimal visual and verbal cues to promote proper body alignment, promote proper body posture and promote proper body mechanics. Progressed resistance, range, repetitions and complexity of movement as indicated. MANUAL THERAPY: (see chart below for minutes): Joint mobilization and Soft tissue mobilization was utilized and necessary because of the patient's restricted joint motion, painful spasm and restricted motion of soft tissue. MODALITIES: (see chart below for minutes):      see chart below for details on pain management. SELF CARE: (see chart below for minutes): Procedure(s) (per grid) utilized to improve and/or restore self-care/home management as related to functional activities . Required minimal visual, verbal and   cueing to facilitate activities of daily living skills. Date: 6-19-19  (EVAL)        Modalities:        IFC with ice                         Therapeutic Exercise: 15 mins        Heel slides with sheet  x30        Sit to stand  x5 reps without hands       LAQ  x20        QS x20                        Proprioceptive Activities:                                Manual Therapy:                        Self Care:  10 mins        Educated patient on ice and elevation as well as pain medication recommendations for timing and safety in the home  10 mins                          HEP:  Pt was given the above exercises for home and was educated on each. [employer] [facility]  Treatment/Session Summary:    · Response to Treatment: Pt would benefit from a HEP and then will be transferred to aquatics for the remainder of therapy to work on ROM and strengthening as well as prehab for the R knee and ambulation without AD.    · Communication/Consultation:  None today  · Equipment provided today:  None today  · Recommendations/Intent for next treatment session: Next visit will focus on strengthening, stretching and modalities for pain relief.     Total Treatment Billable Duration:  45 mins   PT Patient Time In/Time Out  Time In: 1100  Time Out: [address] [facility], PT, DPT

## 2019-06-19 NOTE — PROGRESS NOTES
This note will not be viewable in [address] Transitions of Care  Follow up Outreach Note   Outreach type Phone call: spoke with patient  Home visit:   Date/Time of Outreach: 6/19/19 1258pm     Has patient attended PCP or specialist follow-up appointments since last contact? What was outcome of appointment? When is next follow-up scheduled? Patient states she is doing well. Patient had follow up with Dr. Izabel Doshi on 6/17/19 and schedule surgery for right knee. Patient is now attending outpatient PT. Review medications. Any medication changes since last outreach? Does patient have any questions or issues related to their medications? None stated    No      Home health active? If yes  any issue? Progress? No, [facility] PT discharged with goals met        Referrals needed?  (CM, SW, HH, etc. )   No      Other issues/Miscellaneous? (Transportation, access to meals, ability to perform ADLs, adequate caregiver support, etc.) No other needs or concerns at this time. Patient states her gratitude for follow up. Next Outreach Scheduled?     Graduation from program?   N/A    Yes       Next Steps/Goals (if applicable):   N/A      Outreach completed by:   Diana Farooq, [address] [place] [address] Coordinator

## 2019-06-19 NOTE — THERAPY EVALUATION
Carolyn Ballesteros  : 1950 [facility] [address]  [facility], [address] [place] [address]  [phone]   [phone]        OUTPATIENT PHYSICAL THERAPY:Initial Assessment 2019         ICD-10: Treatment Diagnosis: Difficulty in walking, not elsewhere classified (R26.2) , Pain in left knee (M25.562)  Precautions/Allergies:   Ambien [zolpidem]; Erythromycin; and Norvasc [amlodipine]   Fall Risk Score:     Ambulatory/Rehab Services H2 Model Falls Risk Assessment    Risk Factors:       No Risk Factors Identified Ability to Rise from Chair:       (1)  Pushes up, successful in one attempt    Falls Prevention Plan:       No modifications necessary   Total: (5 or greater = High Risk): 1     [facility] of [employer]. All Rights Reserved. [facility] [employer] Patent #[id number]. Federal Law prohibits the replication, distribution or use without written permission from [facility] of [address] [place] [address]     MD Orders: Eval and Treat  MEDICAL/REFERRING DIAGNOSIS:  Total knee replacement status, left [Z96.652]   DATE OF ONSET: May 23rd   REFERRING PHYSICIAN: Mary Overton MD  RETURN PHYSICIAN APPOINTMENT:  is scheduled for next surgery      INITIAL ASSESSMENT:  Ms. Justino Bryan presents to therapy with pain in the L knee s/p TKA 3 weeks ago. Pt is having her R knee done in a few months due to the amount of arthritis in her knees. Pt is having difficulty with functional activities, decreased strength and ROM, as well as difficulty walking needing a SPC at this time. Pt would benefit from skilled PT for above deficits to return to prior level of function painfree. PROBLEM LIST (Impacting functional limitations):  1. Decreased Strength  2. Decreased ADL/Functional Activities  3. Decreased Transfer Abilities  4. Decreased Ambulation Ability/Technique  5. Decreased Balance  6. Increased Pain  7. Decreased Activity Tolerance  8.  Decreased Flexibility/Joint Mobility INTERVENTIONS PLANNED:  1. Balance Exercise  2. Cold  3. Electrical Stimulation  4. Heat  5. Home Exercise Program (HEP)  6. Manual Therapy  7. Range of Motion (ROM)  8. Therapeutic Activites  9. Therapeutic Exercise/Strengthening  10. Aquatics     TREATMENT PLAN:  Effective Dates: 6/19/2019 TO 9/17/2019 (90 days). Frequency/Duration: 2-3 times a week for 90 Days  GOALS: (Goals have been discussed and agreed upon with patient.)  Short-Term Functional Goals: Time Frame: 4 weeks   1. Ms. Eber Rodriguez to be independent with HEP. 2. Pt able to perform PROM L knee 0-120 ° to return to functional mobility. 3. Pt able to perform functional activities and ambulation without needing the SPC and without increase in pain in the L knee. Discharge Goals: Time Frame: 12 weeks   1. Pt to show improvement in strength to 4+/5 overall in the L knee to return to functional activities without limitations. 2. Pt able to demo full AROM in the L knee painfree to 0-120 ° to have full mobility for all activities. 3. Pt able to perform all exercises and daily walking program painfree in the L knee to prepare for the R knee in the fall. Rehabilitation Potential For Stated Goals: Excellent  Regarding Terri Santillan's therapy, I certify that the treatment plan above will be carried out by a therapist or under their direction. Thank you for this referral,  Jose Manuel Maloney, PT, DPT       Referring Physician Signature: Whit Galdamez MD              Date                      HISTORY:   History of Present Injury/Illness (Reason for Referral):  Pt 78 y/o F with pain in the L knee due to arthritis that has caused her pain for a very long time x 20 years and she finally decided to have her L knee replaced back in May and then she is planning on the R knee in Sept. Both by Dr. Racquel Lou. Pt is having trouble with ROM, pain, sleeping, positioning, perform functional and daily activities, stairs as well as ambulation and needing a SPC.  She had [facility] for 2 weeks and the incisions are closed and she is doing very well. Pt stated the pain is minimal now and she was able to walk down the stairs independently using the HR. Very minimal swelling and no numbness with little tenderness. Past Medical History/Comorbidities:   Ms. Abdias Quezada  has a past medical history of Breast cancer ([address] (2007), DJD (degenerative joint disease) (04/30/2013), Essential hypertension, benign (04/30/2013), Generalized osteoarthrosis, involving multiple sites (4/30/2013), Hand eczema (4/30/2013), Heart murmur, Mixed hyperlipidemia (04/30/2013), Morbid obesity ([address] (04/30/2013), Routine general medical examination at a health care facility (4/30/2013), Type II or unspecified type diabetes mellitus without mention of complication, not stated as uncontrolled (04/30/2013), and Unspecified sleep apnea (04/30/2013). She also has no past medical history of Adverse effect of anesthesia, Aneurysm ([address] Arrhythmia, Asthma, Autoimmune disease ([address] CAD (coronary artery disease), Chronic kidney disease, Chronic obstructive pulmonary disease ([address] Chronic pain, Coagulation disorder ([address] Difficult intubation, Endocarditis, GERD (gastroesophageal reflux disease), Heart failure ([address] Ill-defined condition, Liver disease, Malignant hyperthermia due to anesthesia, Nausea & vomiting, Nicotine vapor product user, Non-nicotine vapor product user, Pseudocholinesterase deficiency, Psychiatric disorder, PUD (peptic ulcer disease), Rheumatic fever, Seizures ([address] Stroke ([address] Thromboembolus ([address] or Thyroid disease. Ms. Abdias Quezada  has a past surgical history that includes pr breast surgery procedure unlisted (08/2010); hx cholecystectomy (1981); hx tonsillectomy (~1950); hx tubal ligation (1984); hx breast lumpectomy (Right, 2007); and hx knee replacement (Left, 05/2019).    Social History/Living Environment:     Lives with  and has ramp entry in the home   Prior Level of Function/Work/Activity:  Retired   Dominant Side:         Right    Current Medications:    Current Outpatient Medications:     diclofenac (VOLTAREN) 1 % gel, APPLY 4 G TO AFFECTED AREA FOUR (4) TIMES DAILY. , Disp: , Rfl: 1    lisinopril-hydroCHLOROthiazide (PRINZIDE, ZESTORETIC) 20-12.5 mg per tablet, take one tablet daily, Disp: , Rfl: 3    insulin NPH (NOVOLIN N NPH U-100 INSULIN) 100 unit/mL injection, 30 Units by SubCUTAneous route Before breakfast and dinner., Disp: 2 Vial, Rfl: 11    insulin regular (NOVOLIN R REGULAR U-100 INSULN) 100 unit/mL injection, 18 units before breakfast, 20 units before lunch and supper plus correction 1/20>150, max daily dose 60 units, Disp: 2 Vial, Rfl: 11    glucose blood VI test strips (ONETOUCH VERIO) strip, Check glucose 4 times daily, E11.65, Disp: 400 Strip, Rfl: 3    lancets (ONE TOUCH DELICA) 33 gauge misc, Check glucose 4 times daily, E11.65, Disp: 400 Lancet, Rfl: 3    diclofenac (VOLTAREN XR) 100 mg ER tablet, Take 50 mg by mouth two (2) times a day., Disp: , Rfl:     oxyCODONE IR (ROXICODONE) 5 mg immediate release tablet, Take 10 mg by mouth every four (4) hours as needed for Pain., Disp: , Rfl:     hydroCHLOROthiazide (HYDRODIURIL) 25 mg tablet, Take 1 Tab by mouth daily. , Disp: 30 Tab, Rfl: 5    dilTIAZem CD (CARDIZEM CD) 240 mg ER capsule, Take 1 Cap by mouth daily. , Disp: 90 Cap, Rfl: 1    rosuvastatin (CRESTOR) 10 mg tablet, Take 1 Tab by mouth nightly., Disp: 90 Tab, Rfl: 1    aspirin delayed-release 81 mg tablet, Take 1 Tab by mouth every twelve (12) hours every twelve (12) hours for 30 days.  (Patient taking differently: Take 1 Tab by mouth daily.), Disp: 60 Tab, Rfl: 0    MAGNI-GUIDE misc, Use with insulin, E11.65, Disp: 1 Each, Rfl: 1    Insulin Syringe-Needle U-100 0.5 mL 31 gauge x 5/16\" syrg, Use with insulin up to 5 times daily, E11.65, Disp: 500 Syringe, Rfl: 3    metFORMIN ER (GLUCOPHAGE XR) 500 mg tablet, Take two twice daily, Disp: 360 Tab, Rfl: 1    melatonin tab tablet, Take 5 mg by mouth nightly., Disp: , Rfl:    Date Last Reviewed:  6/19/2019   # of Personal Factors/Comorbidities that affect the Plan of Care: 3+: HIGH COMPLEXITY   EXAMINATION:   Observation/Orthostatic Postural Assessment:          Good   Palpation:          Tenderness over lateral and medial portion of the knee   ROM:     L knee      Flexion: 93 °      Extension: 3 ° lag    R knee       Flexion: 80 °      Extension: 0 °    Strength:     L knee      Flexion: 4-     Extension: 4-   R knee      Flexion: 4+      Extension: 4+   Special Tests:          N/a   Neurological Screen:        Sensation: no complaint of numbness or tingling    Functional Mobility:         Independent    Balance:          Good    Body Structures Involved:  1. Bones  2. Joints  3. Muscles Body Functions Affected:  1. Movement Related Activities and Participation Affected:  1. Mobility   # of elements that affect the Plan of Care: 4+: HIGH COMPLEXITY   CLINICAL PRESENTATION:   Presentation: Evolving clinical presentation with changing clinical characteristics: MODERATE COMPLEXITY   CLINICAL DECISION MAKING:   Tool Used: Lower Extremity Functional Scale (LEFS)  Score:  Initial: 35/80 Most Recent: X/80 (Date: -- )   Interpretation of Score: 20 questions each scored on a 5 point scale with 0 representing \"extreme difficulty or unable to perform\" and 4 representing \"no difficulty\". The lower the score, the greater the functional disability. 80/80 represents no disability. Minimal detectable change is 9 points. Medical Necessity:   · Patient is expected to demonstrate progress in strength, range of motion, balance and functional technique  to increase independence with functional activities painfree. .  Reason for Services/Other Comments:  · Patient continues to require present interventions due to patient's inability to perform functional and daily activities painfree without limitations.     .   Use of outcome tool(s) and clinical judgement create a POC that gives a: Questionable prediction of patient's progress: MODERATE COMPLEXITY     Total Treatment Duration:  PT Patient Time In/Time Out  Time In: 1100  Time Out: [address] Corrlaes, PT, DPT

## 2019-06-21 ENCOUNTER — HOSPITAL ENCOUNTER (OUTPATIENT)
Dept: PHYSICAL THERAPY | Age: 69
Discharge: HOME | End: 2019-06-21
Payer: MEDICARE

## 2019-06-21 PROCEDURE — 97014 ELECTRIC STIMULATION THERAPY: CPT

## 2019-06-21 PROCEDURE — 97140 MANUAL THERAPY 1/> REGIONS: CPT

## 2019-06-21 PROCEDURE — 97110 THERAPEUTIC EXERCISES: CPT

## 2019-06-21 NOTE — PROGRESS NOTES
Kati Barbosa  : 1950  [place]  Secondary: [place] @ [address], [address] [place] [address]  [phone]   [phone]      OUTPATIENT PHYSICAL THERAPY: Daily Treatment Note  2019   Therapy Diagnosis: L TKA  _______________________________________                                                                                                Effective Dates: 2019 TO 2019 (90 days). Frequency/Duration: 2-3 times a week for 90 Days  GOALS: (Goals have been discussed and agreed upon with patient.)  Short-Term Functional Goals: Time Frame: 4 weeks   1. Ms. Abdias Quezada to be independent with HEP. 2. Pt able to perform PROM L knee 0-120 ° to return to functional mobility. 3. Pt able to perform functional activities and ambulation without needing the SPC and without increase in pain in the L knee. Discharge Goals: Time Frame: 12 weeks   1. Pt to show improvement in strength to 4+/5 overall in the L knee to return to functional activities without limitations. 2. Pt able to demo full AROM in the L knee painfree to 0-120 ° to have full mobility for all activities. Pt able to perform all exercises and daily walking program painfree in the L knee to prepare for the R knee in the fall. __________________________________  Pre-treatment Symptoms/Complaints: \"Im doing well. The exercises hurt. \"   Pain: Initial: 2/10 Post Session:  2/10   Medications Last Reviewed:  2019    Next MD appt: tbd     Updated Objective Findings:  None Today     TREATMENT:   THERAPEUTIC ACTIVITY: ( see chart below for minutes): Therapeutic activities per grid below to improve mobility and strength. Required minimal visual and verbal cues to promote dynamic balance in standing. THERAPEUTIC EXERCISE: (see chart below for minutes):  Exercises per grid below to improve mobility, strength, balance and coordination. Required minimal visual and verbal cues to promote proper body alignment, promote proper body posture and promote proper body mechanics. Progressed resistance, range, repetitions and complexity of movement as indicated. MANUAL THERAPY: (see chart below for minutes): Joint mobilization and Soft tissue mobilization was utilized and necessary because of the patient's restricted joint motion, painful spasm and restricted motion of soft tissue. MODALITIES: (see chart below for minutes):      see chart below for details on pain management. SELF CARE: (see chart below for minutes): Procedure(s) (per grid) utilized to improve and/or restore self-care/home management as related to functional activities . Required minimal visual, verbal and   cueing to facilitate activities of daily living skills. Date: 6-19-19  (EVAL)  6-21-19  (Visit 2)       Modalities:  15 mins       IFC with ice   15 mins                       Therapeutic Exercise: 15 mins  30 mins       Heel slides with sheet  x30  Repeat with strap       Sit to stand  x5 reps without hands 2x5       HS stretch with strap   2x15SH       LAQ  x20        QS x20  x20       SLR   2x10       SAQ  2x20       Nustep   5 mins L1                       Manual Therapy:  10 mins       STM with scar massage   10 mins               Self Care:  10 mins        Educated patient on ice and elevation as well as pain medication recommendations for timing and safety in the home  10 mins                          HEP:  Pt was given the above exercises for home and was educated on each. [employer] Portal  Treatment/Session Summary:    · Response to Treatment: Pt is having some pain but the swelling and the redness as well as the incision looks good. Pt is not having a lot of pain at rest and the only pain she has is with the flexion exercises. Continue with POC.    · Communication/Consultation:  None today  · Equipment provided today:  None today  · Recommendations/Intent for next treatment session: Next visit will focus on strengthening, stretching and modalities for pain relief.     Total Treatment Billable Duration:  60 mins   PT Patient Time In/Time Out  Time In: 1100  Time Out: [address], PT, DPT

## 2019-06-24 ENCOUNTER — HOSPITAL ENCOUNTER (OUTPATIENT)
Dept: PHYSICAL THERAPY | Age: 69
Discharge: HOME | End: 2019-06-24
Payer: MEDICARE

## 2019-06-24 PROBLEM — E83.52 HYPERCALCEMIA: Status: ACTIVE | Noted: 2019-06-24

## 2019-06-24 PROCEDURE — 97014 ELECTRIC STIMULATION THERAPY: CPT

## 2019-06-24 PROCEDURE — 97110 THERAPEUTIC EXERCISES: CPT

## 2019-06-24 NOTE — PROGRESS NOTES
Harpreet Kimberli  : 1950  [place]  Secondary: [place] @ [address]  [phone]   [phone]      OUTPATIENT PHYSICAL THERAPY: Daily Treatment Note  2019   Therapy Diagnosis: L TKA  _______________________________________                                                                                                Effective Dates: 2019 TO 2019 (90 days). Frequency/Duration: 2-3 times a week for 90 Days  GOALS: (Goals have been discussed and agreed upon with patient.)  Short-Term Functional Goals: Time Frame: 4 weeks   1. Ms. Phoebe Batista to be independent with HEP. 2. Pt able to perform PROM L knee 0-120 ° to return to functional mobility. 3. Pt able to perform functional activities and ambulation without needing the SPC and without increase in pain in the L knee. Discharge Goals: Time Frame: 12 weeks   1. Pt to show improvement in strength to 4+/5 overall in the L knee to return to functional activities without limitations. 2. Pt able to demo full AROM in the L knee painfree to 0-120 ° to have full mobility for all activities. Pt able to perform all exercises and daily walking program painfree in the L knee to prepare for the R knee in the fall. __________________________________  Pre-treatment Symptoms/Complaints: No reports of pain prior to tx. Pain: Initial: 0/10 L knee Post Session:  No increase   Medications Last Reviewed:  2019    Next MD appt: tbd     Updated Objective Findings:  L knee AROM is 0-95 ° (19)       TREATMENT:   THERAPEUTIC ACTIVITY: ( see chart below for minutes): Therapeutic activities per grid below to improve mobility and strength. Required minimal visual and verbal cues to promote dynamic balance in standing.   THERAPEUTIC EXERCISE: (see chart below for minutes):  Exercises per grid below to improve mobility, strength, balance and coordination. Required minimal visual and verbal cues to promote proper body alignment, promote proper body posture and promote proper body mechanics. Progressed resistance, range, repetitions and complexity of movement as indicated. MANUAL THERAPY: (see chart below for minutes): Joint mobilization and Soft tissue mobilization was utilized and necessary because of the patient's restricted joint motion, painful spasm and restricted motion of soft tissue. MODALITIES: (see chart below for minutes):      see chart below for details on pain management. SELF CARE: (see chart below for minutes): Procedure(s) (per grid) utilized to improve and/or restore self-care/home management as related to functional activities . Required minimal visual, verbal and   cueing to facilitate activities of daily living skills. Date: 6-19-19  (EVAL)  6-21-19  (Visit 2)  6-24-19 (visit 3)     Modalities:  15 mins  15 min     IFC with ice   15 mins  In long sitting                     Therapeutic Exercise: 15 mins  30 mins  30 min     Heel slides with sheet  x30  Repeat with strap  x20     Sit to stand  x5 reps without hands 2x5  2x10 with one pad in chair with UE support     HS stretch with strap   2x15SH       LAQ  x20        QS x20  x20       SLR   2x10       SAQ  2x20       Nustep   5 mins L1  6 min L4 moving forward to increase knee flexion     Slant board stretch   1 min hold on level 1     Standing hamstring curls   x20 B with 2.5# cuffs     Lateral walk   2 laps green     Heel raises   3x10     Manual Therapy:  10 mins  5 min     STM with scar massage   10 mins  Stretching into flexion      Patella mobs   x30     Self Care:  10 mins        Educated patient on ice and elevation as well as pain medication recommendations for timing and safety in the home  10 mins                          HEP:  Pt was given the above exercises for home and was educated on each.      [employer] Portal  Treatment/Session Summary:    · Response to Treatment: Pt reported increased pain with stretching into flexion but she is limited. Pt has full extension. Continue with POC. · Communication/Consultation:  None today  · Equipment provided today:  None today  · Recommendations/Intent for next treatment session: Next visit will focus on strengthening, stretching and modalities for pain relief.     Total Treatment Billable Duration:  50 mins   PT Patient Time In/Time Out  Time In: 1140  Time Out: 1230    Honor Rip, PTA

## 2019-06-26 ENCOUNTER — HOSPITAL ENCOUNTER (OUTPATIENT)
Dept: PHYSICAL THERAPY | Age: 69
Discharge: HOME | End: 2019-06-26
Payer: MEDICARE

## 2019-06-26 PROCEDURE — 97014 ELECTRIC STIMULATION THERAPY: CPT

## 2019-06-26 PROCEDURE — 97110 THERAPEUTIC EXERCISES: CPT

## 2019-06-26 PROCEDURE — 97140 MANUAL THERAPY 1/> REGIONS: CPT

## 2019-06-26 NOTE — PROGRESS NOTES
Shaun Mcdonough  : 1950  [place]  Secondary: [place] @ [address]  [address] [facility]  [phone]   [phone]      OUTPATIENT PHYSICAL THERAPY: Daily Treatment Note  2019   Therapy Diagnosis: L TKA  _______________________________________                                                                                                Effective Dates: 2019 TO 2019 (90 days). Frequency/Duration: 2-3 times a week for 90 Days  GOALS: (Goals have been discussed and agreed upon with patient.)  Short-Term Functional Goals: Time Frame: 4 weeks   1. Ms. Manuel Jamie to be independent with HEP. 2. Pt able to perform PROM L knee 0-120 ° to return to functional mobility. 3. Pt able to perform functional activities and ambulation without needing the SPC and without increase in pain in the L knee. Discharge Goals: Time Frame: 12 weeks   1. Pt to show improvement in strength to 4+/5 overall in the L knee to return to functional activities without limitations. 2. Pt able to demo full AROM in the L knee painfree to 0-120 ° to have full mobility for all activities. Pt able to perform all exercises and daily walking program painfree in the L knee to prepare for the R knee in the fall. __________________________________  Pre-treatment Symptoms/Complaints: Pt states \"I had to take a pain pill after last time. \" Pt reports R knee pain. Pain: Initial: 0/10 L knee Post Session:  No increase   Medications Last Reviewed:  2019    Next MD appt: clayton (2019 is the R knee replacement surgery)    Updated Objective Findings:  L knee AROM is 5-100 ° (19) Pt had 0-105 degrees passively. TREATMENT:   THERAPEUTIC ACTIVITY: ( see chart below for minutes): Therapeutic activities per grid below to improve mobility and strength.   Required minimal visual and verbal cues to promote dynamic balance in standing. THERAPEUTIC EXERCISE: (see chart below for minutes):  Exercises per grid below to improve mobility, strength, balance and coordination. Required minimal visual and verbal cues to promote proper body alignment, promote proper body posture and promote proper body mechanics. Progressed resistance, range, repetitions and complexity of movement as indicated. MANUAL THERAPY: (see chart below for minutes): Joint mobilization and Soft tissue mobilization was utilized and necessary because of the patient's restricted joint motion, painful spasm and restricted motion of soft tissue. MODALITIES: (see chart below for minutes):      see chart below for details on pain management. SELF CARE: (see chart below for minutes): Procedure(s) (per grid) utilized to improve and/or restore self-care/home management as related to functional activities . Required minimal visual, verbal and   cueing to facilitate activities of daily living skills.      Date: 6-19-19  (EVAL)  6-21-19  (Visit 2)  6-24-19 (visit 3) 6-26-19 (visit 4)    Modalities:  15 mins  15 min 15 min    IFC with ice   15 mins  In long sitting In long sitting                    Therapeutic Exercise: 15 mins  30 mins  30 min 30 min    Heel slides with sheet  x30  Repeat with strap  x20 Wall slides with 2.5# cuff 3x10    Sit to stand  x5 reps without hands 2x5  2x10 with one pad in chair with UE support 3x10 with UE support    HS stretch with strap   2x15SH       LAQ  x20        QS x20  x20       SLR   2x10       SAQ  2x20       Nustep   5 mins L1  6 min L4 moving forward to increase knee flexion 5 min L4 sliding seat forward    Slant board stretch   1 min hold on level 1 1 min hold on level 1    Standing hamstring curls   x20 B with 2.5# cuffs     Standing hip 3 way with blue band    x15 ea B blue band    Lateral walk   2 laps green 2 laps blue    Heel raises   3x10 x30    Manual Therapy:  10 mins  5 min 15 min    STM with scar massage   10 mins  Stretching into flexion  Stretching into flexion    Patella mobs   x30 x30    Self Care:  10 mins        Educated patient on ice and elevation as well as pain medication recommendations for timing and safety in the home  10 mins                          HEP:  Pt was given the above exercises for home and was educated on each. [employer] Portal  Treatment/Session Summary:    · Response to Treatment:  Pt has full extension passively. She was issued an updated HEP and a blue band. Continue with POC. · Communication/Consultation:  None today  · Equipment provided today:  None today  · Recommendations/Intent for next treatment session: Next visit will focus on strengthening, stretching and modalities for pain relief.     Total Treatment Billable Duration:  60 mins   PT Patient Time In/Time Out  Time In: 1100  Time Out: 1200    Darlin Springer, PTA

## 2019-06-28 ENCOUNTER — HOSPITAL ENCOUNTER (OUTPATIENT)
Dept: PHYSICAL THERAPY | Age: 69
Discharge: HOME | End: 2019-06-28
Payer: MEDICARE

## 2019-06-28 PROCEDURE — 97014 ELECTRIC STIMULATION THERAPY: CPT

## 2019-06-28 PROCEDURE — 97140 MANUAL THERAPY 1/> REGIONS: CPT

## 2019-06-28 PROCEDURE — 97110 THERAPEUTIC EXERCISES: CPT

## 2019-06-28 NOTE — PROGRESS NOTES
Julieta Kenny  : 1950  [place]  Secondary: [place] @ [address], [address] [place] [address]  [phone]   [phone]      OUTPATIENT PHYSICAL THERAPY: Daily Treatment Note  2019   Therapy Diagnosis: L TKA  _______________________________________                                                                                                Effective Dates: 2019 TO 2019 (90 days). Frequency/Duration: 2-3 times a week for 90 Days  GOALS: (Goals have been discussed and agreed upon with patient.)  Short-Term Functional Goals: Time Frame: 4 weeks   1. Ms. Stacie Nath to be independent with HEP. 2. Pt able to perform PROM L knee 0-120 ° to return to functional mobility. 3. Pt able to perform functional activities and ambulation without needing the SPC and without increase in pain in the L knee. Discharge Goals: Time Frame: 12 weeks   1. Pt to show improvement in strength to 4+/5 overall in the L knee to return to functional activities without limitations. 2. Pt able to demo full AROM in the L knee painfree to 0-120 ° to have full mobility for all activities. Pt able to perform all exercises and daily walking program painfree in the L knee to prepare for the R knee in the fall. __________________________________  Pre-treatment Symptoms/Complaints: Pt reports no pain prior to tx and she is not ambulating with the cane today. Pain: Initial: 0/10 L knee Post Session:  No increase   Medications Last Reviewed:  2019    Next MD appt: clayton (2019 is the R knee replacement surgery)    Updated Objective Findings:  L knee AROM is 0-100 ° (19) Pt had 0-110 degrees passively. TREATMENT:   THERAPEUTIC ACTIVITY: ( see chart below for minutes): Therapeutic activities per grid below to improve mobility and strength.   Required minimal visual and verbal cues to promote dynamic balance in standing. THERAPEUTIC EXERCISE: (see chart below for minutes):  Exercises per grid below to improve mobility, strength, balance and coordination. Required minimal visual and verbal cues to promote proper body alignment, promote proper body posture and promote proper body mechanics. Progressed resistance, range, repetitions and complexity of movement as indicated. MANUAL THERAPY: (see chart below for minutes): Joint mobilization and Soft tissue mobilization was utilized and necessary because of the patient's restricted joint motion, painful spasm and restricted motion of soft tissue. MODALITIES: (see chart below for minutes):      see chart below for details on pain management. SELF CARE: (see chart below for minutes): Procedure(s) (per grid) utilized to improve and/or restore self-care/home management as related to functional activities . Required minimal visual, verbal and   cueing to facilitate activities of daily living skills.      Date: 6-19-19  (EVAL)  6-21-19  (Visit 2)  6-24-19 (visit 3) 6-26-19 (visit 4) 6-28-19 (visit 5)   Modalities:  15 mins  15 min 15 min 15 min   IFC with ice   15 mins  In long sitting In long sitting In long sitting                   Therapeutic Exercise: 15 mins  30 mins  30 min 30 min 35 min   bridges     3x10   Heel slides with sheet  x30  Repeat with strap  x20 Wall slides with 2.5# cuff 3x10 x30 with strap   Sit to stand  x5 reps without hands 2x5  2x10 with one pad in chair with UE support 3x10 with UE support 2x10 no UE support   HS stretch with strap   2x15SH       LAQ  x20        QS x20  x20       SLR   2x10    3x10 LLE   SAQ  2x20       Nustep   5 mins L1  6 min L4 moving forward to increase knee flexion 5 min L4 sliding seat forward 5 min L4   Slant board stretch   1 min hold on level 1 1 min hold on level 1 1 min hold on level 2   Standing hamstring curls   x20 B with 2.5# cuffs  2 laps with 2.5# cuff and ski poles for balance   Standing hip 3 way with blue band    x15 ea B blue band    Lateral walk   2 laps green 2 laps blue 2 laps blue   Heel raises   3x10 x30 x30   Manual Therapy:  10 mins  5 min 15 min 10 min   STM with scar massage   10 mins  Stretching into flexion  Stretching into flexion Stretching into flexion   Patella mobs   x30 x30 x30   Self Care:  10 mins        Educated patient on ice and elevation as well as pain medication recommendations for timing and safety in the home  10 mins                          HEP:  Pt was given the above exercises for home and was educated on each. [employer] Portal  Treatment/Session Summary:    · Response to Treatment:  Pt is making good progress with ROM. Pt will transiition to the pool next week. Continue with POC. · Communication/Consultation:  None today  · Equipment provided today:  None today  · Recommendations/Intent for next treatment session: Next visit will focus on strengthening, stretching and modalities for pain relief.     Total Treatment Billable Duration:  60 mins   PT Patient Time In/Time Out  Time In: 1055  Time Out: 1155    Yoseph Loya PTA

## 2019-07-01 ENCOUNTER — HOSPITAL ENCOUNTER (OUTPATIENT)
Dept: PHYSICAL THERAPY | Age: 69
Discharge: HOME | End: 2019-07-01
Payer: MEDICARE

## 2019-07-01 PROCEDURE — 97014 ELECTRIC STIMULATION THERAPY: CPT

## 2019-07-01 PROCEDURE — 97113 AQUATIC THERAPY/EXERCISES: CPT

## 2019-07-01 NOTE — PROGRESS NOTES
Sherri Chacon  : 1950  [place]  Secondary: [place] @ [address], [place].  [phone]   [phone]      OUTPATIENT PHYSICAL THERAPY: Daily Treatment Note  2019   Therapy Diagnosis: L TKA  _______________________________________                                                                                                Effective Dates: 2019 TO 2019 (90 days). Frequency/Duration: 2-3 times a week for 90 Days  GOALS: (Goals have been discussed and agreed upon with patient.)  Short-Term Functional Goals: Time Frame: 4 weeks   1. Ms. Albino Carrion to be independent with HEP. 2. Pt able to perform PROM L knee 0-120 ° to return to functional mobility. 3. Pt able to perform functional activities and ambulation without needing the SPC and without increase in pain in the L knee. Discharge Goals: Time Frame: 12 weeks   1. Pt to show improvement in strength to 4+/5 overall in the L knee to return to functional activities without limitations. 2. Pt able to demo full AROM in the L knee painfree to 0-120 ° to have full mobility for all activities. Pt able to perform all exercises and daily walking program painfree in the L knee to prepare for the R knee in the fall. __________________________________      Pre-treatment Symptoms/Complaints: Pt reports mild soreness with L knee at time of treatment today. Primary complaint of R knee pain (TKA scheduled in Sept). She presents amb with SPC, with notable R>L knee pain. .    Pain: Initial: 1-2/10 L knee       Post Session:  No increase           Medications Last Reviewed:  2019    Next MD appt: clayton (2019 is the R knee replacement surgery)    Updated Objective Findings:  L knee AROM is 0-100 ° (19) Pt had 0-110 degrees passively.        TREATMENT:   THERAPEUTIC ACTIVITY: ( see chart below for minutes): Therapeutic activities per grid below to improve mobility and strength. Required minimal visual and verbal cues to promote dynamic balance in standing. THERAPEUTIC EXERCISE: (see chart below for minutes):  Exercises per grid below to improve mobility, strength, balance and coordination. Required minimal visual and verbal cues to promote proper body alignment, promote proper body posture and promote proper body mechanics. Progressed resistance, range, repetitions and complexity of movement as indicated. MANUAL THERAPY: (see chart below for minutes): Joint mobilization and Soft tissue mobilization was utilized and necessary because of the patient's restricted joint motion, painful spasm and restricted motion of soft tissue. MODALITIES: (see chart below for minutes):      see chart below for details on pain management. SELF CARE: (see chart below for minutes): Procedure(s) (per grid) utilized to improve and/or restore self-care/home management as related to functional activities . Required minimal visual, verbal and   cueing to facilitate activities of daily living skills.      Date: 6-19-19  (EVAL)  6-21-19  (Visit 2)  6-24-19 (visit 3) 6-26-19 (visit 4) 6-28-19 (visit 5) 7/1/19  Visit 6   Modalities:  15 mins  15 min 15 min 15 min 15 min   IFC with ice   15 mins  In long sitting In long sitting In long sitting L knee in long sitting                     Aquatic Exercise:      45 min   1.5#   Gait F/B/S/M      x6L F  X4 L B/S/M   SLR Gait         HSC Gait      X2L   Rockettes         Heel Toe Raises      x20   squats      x20   Hip  4-way      2x10 B   lunges         clamshells         Hip Circles                  Deep Well: bicycle      5 min   Deep Well: \"jacks\"       2 min   Deep Well: \"scissors\"      2 min   Deep Well; aquatic traction      3 min            Core: paddles B UE \"rows\" in squat stance         Core: paddles B UE horz abd/add  in squat stance         Core: dumbbells B UE abd/add in squat stance Core: dumbbells B UE flex/extn in squat stance                  Hamstring stretch         Piriformis stretch                  Therapeutic Exercise: 15 mins  30 mins  30 min 30 min 35 min    bridges     3x10    Heel slides with sheet  x30  Repeat with strap  x20 Wall slides with 2.5# cuff 3x10 x30 with strap    Sit to stand  x5 reps without hands 2x5  2x10 with one pad in chair with UE support 3x10 with UE support 2x10 no UE support    HS stretch with strap   2x15SH        LAQ  x20         QS x20  x20        SLR   2x10    3x10 LLE    SAQ  2x20        Nustep   5 mins L1  6 min L4 moving forward to increase knee flexion 5 min L4 sliding seat forward 5 min L4    Slant board stretch   1 min hold on level 1 1 min hold on level 1 1 min hold on level 2    Standing hamstring curls   x20 B with 2.5# cuffs  2 laps with 2.5# cuff and ski poles for balance    Standing hip 3 way with blue band    x15 ea B blue band     Lateral walk   2 laps green 2 laps blue 2 laps blue    Heel raises   3x10 x30 x30    Manual Therapy:  10 mins  5 min 15 min 10 min    STM with scar massage   10 mins  Stretching into flexion  Stretching into flexion Stretching into flexion    Patella mobs   x30 x30 x30    Self Care:  10 mins         Educated patient on ice and elevation as well as pain medication recommendations for timing and safety in the home  10 mins                             HEP:  Pt was given the above exercises for home and was educated on each. [email or web address] Portal  Treatment/Session Summary:    · Response to Treatment:   With today's treatment session, patient was introduced to an aquatic therapy program to aide in reducing knee pain and improving functional strength, mobility and flexibility. Verbal and visual cuing provided to assure proper and safe execution and body mechanics. Pt demonstrates mild stiff, tight discomfort  L knee and discomfort with R knee, with exercises involving, ROM.  R knee is scheduled for TKA in Sept. Modalities following aquatics. · Communication/Consultation:  None today  · Equipment provided today:  None today  · Recommendations/Intent for next treatment session: Next visit will focus on strengthening, stretching and modalities for pain relief.     Total Treatment Billable Duration:  60 mins   PT Patient Time In/Time Out  Time In: 5337  Time Out: 1309 [facility]

## 2019-07-03 ENCOUNTER — HOSPITAL ENCOUNTER (OUTPATIENT)
Dept: PHYSICAL THERAPY | Age: 69
Discharge: HOME | End: 2019-07-03
Payer: MEDICARE

## 2019-07-03 PROCEDURE — 97113 AQUATIC THERAPY/EXERCISES: CPT

## 2019-07-03 PROCEDURE — 97014 ELECTRIC STIMULATION THERAPY: CPT

## 2019-07-03 NOTE — PROGRESS NOTES
Willem Maldonado  : 1950  [place]  Secondary: [place] @ [address], [address] [place] [address]  [phone]   [phone]      OUTPATIENT PHYSICAL THERAPY: Daily Treatment Note  7/3/2019   Therapy Diagnosis: L TKA  _______________________________________                                                                                                Effective Dates: 2019 TO 2019 (90 days). Frequency/Duration: 2-3 times a week for 90 Days  GOALS: (Goals have been discussed and agreed upon with patient.)  Short-Term Functional Goals: Time Frame: 4 weeks   1. Ms. Bi Lujan to be independent with HEP. 2. Pt able to perform PROM L knee 0-120 ° to return to functional mobility. 3. Pt able to perform functional activities and ambulation without needing the SPC and without increase in pain in the L knee. Discharge Goals: Time Frame: 12 weeks   1. Pt to show improvement in strength to 4+/5 overall in the L knee to return to functional activities without limitations. 2. Pt able to demo full AROM in the L knee painfree to 0-120 ° to have full mobility for all activities. Pt able to perform all exercises and daily walking program painfree in the L knee to prepare for the R knee in the fall. __________________________________      Pre-treatment Symptoms/Complaints: Pt reports no significant changes. Says overall L knee \"feels good\". Primary complaint of L knee mild soreness. Says R knee is now more painful      R knee scheduled for TKA in September. .    Pain: Initial: 3-4/10 L knee       Post Session:  No increase           Medications Last Reviewed:  7/3/2019    Next MD appt: clayton (2019 is the R knee replacement surgery)    Updated Objective Findings:  L knee AROM is 0-100 ° (19) Pt had 0-110 degrees passively.   7/3/19 L knee flexion 0-130 - minimal tightness at end of available range       TREATMENT:   THERAPEUTIC ACTIVITY: ( see chart below for minutes): Therapeutic activities per grid below to improve mobility and strength. Required minimal visual and verbal cues to promote dynamic balance in standing. THERAPEUTIC EXERCISE: (see chart below for minutes):  Exercises per grid below to improve mobility, strength, balance and coordination. Required minimal visual and verbal cues to promote proper body alignment, promote proper body posture and promote proper body mechanics. Progressed resistance, range, repetitions and complexity of movement as indicated. MANUAL THERAPY: (see chart below for minutes): Joint mobilization and Soft tissue mobilization was utilized and necessary because of the patient's restricted joint motion, painful spasm and restricted motion of soft tissue. MODALITIES: (see chart below for minutes):      see chart below for details on pain management. SELF CARE: (see chart below for minutes): Procedure(s) (per grid) utilized to improve and/or restore self-care/home management as related to functional activities . Required minimal visual, verbal and   cueing to facilitate activities of daily living skills.      Date: 6-19-19  (EVAL)  6-21-19  (Visit 2)  6-24-19 (visit 3) 6-26-19 (visit 4) 6-28-19 (visit 5) 7/1/19  Visit 6 7/3/19  Visit 7   Modalities:  15 mins  15 min 15 min 15 min 15 min  15 mins   IFC with ice   15 mins  In long sitting In long sitting In long sitting L knee in long sitting L knee                       Aquatic Exercise:      45 min   1.5#  55mins   Gait F/B/S/M      x6L F  X4 L B/S/M xf4L ea   SLR Gait       x4L   HSC Gait      X2L x4L   Rockettes          Heel Toe Raises      x20 x20   squats      x20 x20   Hip  4-way      2x10 B 2x10 B   lunges          clamshells          Hip Circles       x10 cw/ccw B             Deep Well: bicycle      5 min 5 mins   Deep Well: \"jacks\"       2 min 2 mins   Deep Well: \"scissors\"      2 min 2 mins Deep Well; aquatic traction      3 min 5 min             Core: paddles B UE \"rows\" in squat stance          Core: paddles B UE horz abd/add  in squat stance          Core: dumbbells B UE abd/add in squat stance          Core: dumbbells B UE flex/extn in squat stance                    Hamstring stretch       2xH30 B   Piriformis stretch                    Therapeutic Exercise: 15 mins  30 mins  30 min 30 min 35 min     bridges     3x10     Heel slides with sheet  x30  Repeat with strap  x20 Wall slides with 2.5# cuff 3x10 x30 with strap     Sit to stand  x5 reps without hands 2x5  2x10 with one pad in chair with UE support 3x10 with UE support 2x10 no UE support     HS stretch with strap   2x15SH         LAQ  x20          QS x20  x20         SLR   2x10    3x10 LLE     SAQ  2x20         Nustep   5 mins L1  6 min L4 moving forward to increase knee flexion 5 min L4 sliding seat forward 5 min L4     Slant board stretch   1 min hold on level 1 1 min hold on level 1 1 min hold on level 2     Standing hamstring curls   x20 B with 2.5# cuffs  2 laps with 2.5# cuff and ski poles for balance     Standing hip 3 way with blue band    x15 ea B blue band      Lateral walk   2 laps green 2 laps blue 2 laps blue     Heel raises   3x10 x30 x30     Manual Therapy:  10 mins  5 min 15 min 10 min     STM with scar massage   10 mins  Stretching into flexion  Stretching into flexion Stretching into flexion     Patella mobs   x30 x30 x30     Self Care:  10 mins          Educated patient on ice and elevation as well as pain medication recommendations for timing and safety in the home  10 mins                                HEP:  Pt was given the above exercises for home and was educated on each. [employer] Portal  Treatment/Session Summary:    · Response to Treatment:   Today's treatment included aquatic therapy, completed per flow sheet above, followed by modalities as listed.  Pt able to complete all aquatic exercises without exacerbation of B knee pain. She presents today with mild to moderate swelling of L knee, which has mild warmth to touch    , ROM. R knee is scheduled for TKA in Sept. Modalities following aquatics. · Communication/Consultation:  None today  · Equipment provided today:  None today  · Recommendations/Intent for next treatment session: Next visit will focus on strengthening, stretching and modalities for pain relief.     Total Treatment Billable Duration:  70mins   PT Patient Time In/Time Out  Time In: 1015  Time Out: 900 [facility]

## 2019-07-05 ENCOUNTER — HOSPITAL ENCOUNTER (OUTPATIENT)
Dept: PHYSICAL THERAPY | Age: 69
Discharge: HOME | End: 2019-07-05
Payer: MEDICARE

## 2019-07-05 PROCEDURE — 97113 AQUATIC THERAPY/EXERCISES: CPT

## 2019-07-05 PROCEDURE — 97140 MANUAL THERAPY 1/> REGIONS: CPT

## 2019-07-05 NOTE — PROGRESS NOTES
Lakshmi Henriquez  : 1950  [place]  Secondary: [place] @ [facility]  [address]  [phone]   [phone]      OUTPATIENT PHYSICAL THERAPY: Daily Treatment Note  2019   Therapy Diagnosis: L TKA  19  _______________________________________                                                                                                Effective Dates: 2019 TO 2019 (90 days). Frequency/Duration: 2-3 times a week for 90 Days  GOALS: (Goals have been discussed and agreed upon with patient.)  Short-Term Functional Goals: Time Frame: 4 weeks   1. Ms. Mal Hodge to be independent with HEP. 2. Pt able to perform PROM L knee 0-120 ° to return to functional mobility. - (MET 19)  3. Pt able to perform functional activities and ambulation without needing the SPC and without increase in pain in the L knee. Discharge Goals: Time Frame: 12 weeks   1. Pt to show improvement in strength to 4+/5 overall in the L knee to return to functional activities without limitations. 2. Pt able to demo full AROM in the L knee painfree to 0-120 ° to have full mobility for all activities. - (MET 19)  3. Pt able to perform all exercises and daily walking program painfree in the L knee to prepare for the R knee in the fall. __________________________________      Pre-treatment Symptoms/Complaints: Pt says L knee \"feels good\" says is nearly pain-free. Says R knee is now more painful . R knee scheduled for TKA in September. Pain: Initial: 0-1/10 L knee    She is consistently increasing participation with ADLs without exacerbation of L knee pain.       Post Session:  No increase           Medications Last Reviewed:  2019    Next MD appt: clayton (2019 is the R knee replacement surgery)    Updated Objective Findings:  L knee AROM is 0-100 °   19 Pt had 0-110 degrees passively. 7/3/19 L knee flexion 0-130 - minimal tightness at end of available range       TREATMENT:   THERAPEUTIC ACTIVITY: ( see chart below for minutes): Therapeutic activities per grid below to improve mobility and strength. Required minimal visual and verbal cues to promote dynamic balance in standing. THERAPEUTIC EXERCISE: (see chart below for minutes):  Exercises per grid below to improve mobility, strength, balance and coordination. Required minimal visual and verbal cues to promote proper body alignment, promote proper body posture and promote proper body mechanics. Progressed resistance, range, repetitions and complexity of movement as indicated. MANUAL THERAPY: (see chart below for minutes): Joint mobilization and Soft tissue mobilization was utilized and necessary because of the patient's restricted joint motion, painful spasm and restricted motion of soft tissue. MODALITIES: (see chart below for minutes):      see chart below for details on pain management. SELF CARE: (see chart below for minutes): Procedure(s) (per grid) utilized to improve and/or restore self-care/home management as related to functional activities . Required minimal visual, verbal and   cueing to facilitate activities of daily living skills.      Date: 6-19-19  (EVAL)  6-21-19  (Visit 2)  6-24-19 (visit 3) 6-26-19 (visit 4) 6-28-19 (visit 5) 7/1/19  Visit 6 7/3/19  Visit 7 7/5/19  Visit 8 PROGRESS NOTE   Modalities:  15 mins  15 min 15 min 15 min 15 min  15 mins 15 mins    IFC with ice   15 mins  In long sitting In long sitting In long sitting L knee in long sitting L knee L knee                            Aquatic Exercise:      45 min   1.5#  55mins  1.5#  55 mins  2.5#    Gait F/B/S/M      x6L F  X4 L B/S/M xf4L ea x4L ea    SLR Gait       x4L x4L    HSC Gait      X2L x4L     Rockettes            Heel Toe Raises      x20 x20 x20    squats      x20 x20 x20 at front rail    Hip  4-way      2x10 B 2x10 B 2x10B    lunges Reverse with foot on stair x10 B    clamshells            Hip Circles       x10 cw/ccw B x10 cw/ccw B                Deep Well: bicycle      5 min 5 mins 5 min    Deep Well: \"jacks\"       2 min 2 mins 2 min    Deep Well: \"scissors\"      2 min 2 mins 2 min    Deep Well; aquatic traction      3 min 5 min 2 min                Core: paddles B UE \"rows\" in squat stance        x20    Core: paddles B UE horz abd/add  in squat stance        x20    Core: dumbbells B UE abd/add in squat stance        x20    Core: dumbbells B UE flex/extn in squat stance        x20                Hamstring stretch       2xH30 B 2xH30     Piriformis stretch                        Therapeutic Exercise: 15 mins  30 mins  30 min 30 min 35 min       bridges     3x10       Heel slides with sheet  x30  Repeat with strap  x20 Wall slides with 2.5# cuff 3x10 x30 with strap       Sit to stand  x5 reps without hands 2x5  2x10 with one pad in chair with UE support 3x10 with UE support 2x10 no UE support       HS stretch with strap   2x15SH           LAQ  x20            QS x20  x20           SLR   2x10    3x10 LLE       SAQ  2x20           Nustep   5 mins L1  6 min L4 moving forward to increase knee flexion 5 min L4 sliding seat forward 5 min L4       Slant board stretch   1 min hold on level 1 1 min hold on level 1 1 min hold on level 2       Standing hamstring curls   x20 B with 2.5# cuffs  2 laps with 2.5# cuff and ski poles for balance       Standing hip 3 way with blue band    x15 ea B blue band        Lateral walk   2 laps green 2 laps blue 2 laps blue       Heel raises   3x10 x30 x30       Manual Therapy:  10 mins  5 min 15 min 10 min       STM with scar massage   10 mins  Stretching into flexion  Stretching into flexion Stretching into flexion       Patella mobs   x30 x30 x30       Self Care:  10 mins            Educated patient on ice and elevation as well as pain medication recommendations for timing and safety in the home  10 mins HEP:  Pt was given the above exercises for home and was educated on each. [employer] Portal  Treatment/Session Summary:    · Response to Treatment:   Today's treatment included aquatic therapy, completed per flow sheet above, followed by modalities as listed. Pt able to complete all aquatic exercises without exacerbation of B knee pain. She presents today with mild to moderate swelling of L knee. Pt continues to do well with aquatics. Anticipate transition to land TherEx over the next 1-2 weeks with discharge to follow.     ROM. R knee is scheduled for TKA in Sept. Modalities following aquatics. · Communication/Consultation:  None today  · Equipment provided today:  None today  · Recommendations/Intent for next treatment session: Next visit will focus on strengthening, stretching and modalities for pain relief.     Total Treatment Billable Duration:  70mins   PT Patient Time In/Time Out  Time In: 1100  Time Out: 1210    Alexus Talley PTA

## 2019-07-08 ENCOUNTER — HOSPITAL ENCOUNTER (OUTPATIENT)
Dept: PHYSICAL THERAPY | Age: 69
Discharge: HOME | End: 2019-07-08
Payer: MEDICARE

## 2019-07-08 PROCEDURE — 97014 ELECTRIC STIMULATION THERAPY: CPT

## 2019-07-08 PROCEDURE — 97113 AQUATIC THERAPY/EXERCISES: CPT

## 2019-07-08 NOTE — PROGRESS NOTES
Judyjamil Cb  : 1950  [place]  [place]: [place] [address]  [phone]   [phone]      OUTPATIENT PHYSICAL THERAPY: Daily Treatment Note  2019   Therapy Diagnosis: L TKA  19  _______________________________________                                                                                                Effective Dates: 2019 TO 2019 (90 days). Frequency/Duration: 2-3 times a week for 90 Days  GOALS: (Goals have been discussed and agreed upon with patient.)  Short-Term Functional Goals: Time Frame: 4 weeks   1. Ms. Ricky Ortega to be independent with HEP. 2. Pt able to perform PROM L knee 0-120 ° to return to functional mobility. - (MET 19)  3. Pt able to perform functional activities and ambulation without needing the SPC and without increase in pain in the L knee. (MET - R knee limits her)  Discharge Goals: Time Frame: 12 weeks   1. Pt to show improvement in strength to 4+/5 overall in the L knee to return to functional activities without limitations. (MET)  2. Pt able to demo full AROM in the L knee painfree to 0-120 ° to have full mobility for all activities. - (MET 19)  3. Pt able to perform all exercises and daily walking program painfree in the L knee to prepare for the R knee in the fall. (MET -R knee limits her)__________________________________      Pre-treatment Symptoms/Complaints:     Pain: Initial: 0-1/10 L knee    Pt reports no pain in L knee consistently and states her R knee is the bigger problem right now. \"I would be doing everything I want if my R knee didn't hurt.  My L knee is fine\"     Post Session:  No increase           Medications Last Reviewed:  2019    Next MD appt: clayton (2019 is the R knee replacement surgery)    Updated Objective Findings:  L knee AROM is 0-100 °   19 Pt had 0-110 degrees passively. 7/3/19 L knee flexion 0-130 - minimal tightness at end of available range       TREATMENT:   THERAPEUTIC ACTIVITY: ( see chart below for minutes): Therapeutic activities per grid below to improve mobility and strength. Required minimal visual and verbal cues to promote dynamic balance in standing. THERAPEUTIC EXERCISE: (see chart below for minutes):  Exercises per grid below to improve mobility, strength, balance and coordination. Required minimal visual and verbal cues to promote proper body alignment, promote proper body posture and promote proper body mechanics. Progressed resistance, range, repetitions and complexity of movement as indicated. MANUAL THERAPY: (see chart below for minutes): Joint mobilization and Soft tissue mobilization was utilized and necessary because of the patient's restricted joint motion, painful spasm and restricted motion of soft tissue. MODALITIES: (see chart below for minutes):      see chart below for details on pain management. SELF CARE: (see chart below for minutes): Procedure(s) (per grid) utilized to improve and/or restore self-care/home management as related to functional activities . Required minimal visual, verbal and   cueing to facilitate activities of daily living skills.      Date: 6-19-19  (EVAL)  6-21-19  (Visit 2)  6-24-19 (visit 3) 6-26-19 (visit 4) 6-28-19 (visit 5) 7/1/19  Visit 6 7/3/19  Visit 7 7/5/19  Visit 8 7/8/19  Visit 9   Modalities:  15 mins  15 min 15 min 15 min 15 min  15 mins 15 mins 15 min   IFC with ice   15 mins  In long sitting In long sitting In long sitting L knee in long sitting L knee L knee L knee                           Aquatic Exercise:      45 min   1.5#  55mins  1.5#  55 mins  2.5# 2.5# 45 min   Gait F/B/S/M      x6L F  X4 L B/S/M xf4L ea x4L ea x4L   SLR Gait       x4L x4L x4L   HSC Gait      X2L x4L  x4L   Rockettes         x2L pain in R knee   Heel Toe Raises      x20 x20 x20    squats      x20 x20 x20 at front rail    Hip  4-way      2x10 B 2x10 B 2x10B    lunges        Reverse with foot on stair x10 B    clamshells            Hip Circles       x10 cw/ccw B x10 cw/ccw B                Deep Well: bicycle      5 min 5 mins 5 min 5 min   Deep Well: \"jacks\"       2 min 2 mins 2 min 2 min   Deep Well: \"scissors\"      2 min 2 mins 2 min 2 min   Deep Well; aquatic traction      3 min 5 min 2 min 2 mint               Core: paddles B UE \"rows\" in squat stance        x20    Core: paddles B UE horz abd/add  in squat stance        x20    Core: dumbbells B UE abd/add in squat stance        x20    Core: dumbbells B UE flex/extn in squat stance        x20                Hamstring stretch       2xH30 B 2xH30  2H30   Piriformis stretch                        Therapeutic Exercise: 15 mins  30 mins  30 min 30 min 35 min       bridges     3x10       Heel slides with sheet  x30  Repeat with strap  x20 Wall slides with 2.5# cuff 3x10 x30 with strap       Sit to stand  x5 reps without hands 2x5  2x10 with one pad in chair with UE support 3x10 with UE support 2x10 no UE support       HS stretch with strap   2x15SH           LAQ  x20            QS x20  x20           SLR   2x10    3x10 LLE       SAQ  2x20           Nustep   5 mins L1  6 min L4 moving forward to increase knee flexion 5 min L4 sliding seat forward 5 min L4       Slant board stretch   1 min hold on level 1 1 min hold on level 1 1 min hold on level 2       Standing hamstring curls   x20 B with 2.5# cuffs  2 laps with 2.5# cuff and ski poles for balance       Standing hip 3 way with blue band    x15 ea B blue band        Lateral walk   2 laps green 2 laps blue 2 laps blue       Heel raises   3x10 x30 x30       Manual Therapy:  10 mins  5 min 15 min 10 min       STM with scar massage   10 mins  Stretching into flexion  Stretching into flexion Stretching into flexion       Patella mobs   x30 x30 x30       Self Care:  10 mins            Educated patient on ice and elevation as well as pain medication recommendations for timing and safety in the home  10 mins                                      HEP:  Pt was given the above exercises for home and was educated on each. [employer] Portal  Treatment/Session Summary:    Response to Treatment:  Continued aquatics as seen above. Pt tolerates well with L knee but is having difficulty with pain in R knee which is pending TKA in September 2019. Pt has financial cap on insurance for the year and will need money remaining for next surgery. Plan to d/c next visit to aquatic HEP (pt [facility] silver sneakers at [facility]) as pt is doing very well with ADL's and ROM in L knee. L knee ROM 0-. Provided aquatic printout today for HEP at d/c     · Communication/Consultation:  None today  · Equipment provided today:  None today  · Recommendations/Intent for next treatment session: Next visit will focus on strengthening, stretching and modalities for pain relief. Plan to d/c due to pending R TKA and financial limitations on insurance.      Total Treatment Billable Duration:  60 mins   PT Patient Time In/Time Out  Time In: 1015  Time Out: [facility]

## 2019-07-10 ENCOUNTER — HOSPITAL ENCOUNTER (OUTPATIENT)
Dept: PHYSICAL THERAPY | Age: 69
Discharge: HOME | End: 2019-07-10
Payer: MEDICARE

## 2019-07-10 PROCEDURE — 97113 AQUATIC THERAPY/EXERCISES: CPT

## 2019-07-10 PROCEDURE — 97014 ELECTRIC STIMULATION THERAPY: CPT

## 2019-07-10 NOTE — PROGRESS NOTES
Tanna Medrano  : 1950  [place]  Secondary: [place] [address]  [phone]   [phone]      OUTPATIENT PHYSICAL THERAPY: Daily Treatment Note/ Discharge Summary  7/10/2019   Therapy Diagnosis: L TKA  19  _______________________________________                                                                                                Effective Dates: 2019 TO 2019 (90 days). Frequency/Duration: 2-3 times a week for 90 Days  GOALS: (Goals have been discussed and agreed upon with patient.)  Short-Term Functional Goals: Time Frame: 4 weeks   1. Ms. Addis Yanez to be independent with HEP. - MET 7/10/19)  2. Pt able to perform PROM L knee 0-120 ° to return to functional mobility. - (MET 19)  3. Pt able to perform functional activities and ambulation without needing the SPC and without increase in pain in the L knee. (MET 7/10/19)  Discharge Goals: Time Frame: 12 weeks   1. Pt to show improvement in strength to 4+/5 overall in the L knee to return to functional activities without limitations. (MET)  2. Pt able to demo full AROM in the L knee painfree to 0-120 ° to have full mobility for all activities. - (MET 19)  Pt able to perform all exercises and daily walking program painfree in the L knee to prepare for the R knee in the fall. (MET -R knee limits her)        Pre-treatment Symptoms/Complaints:Patient is pleased with progress concerning L TKA. Primary complaint of R knee pain, which she states inhibits ADLs , \"not the L knee\". Pain: Initial: 0-1/10 L knee    Pt reports no pain in L knee consistently and states her R knee is the bigger problem right now. Pt has joined [facility] to continue with aquatics as part of HEP.       She says Is ready for discharge at this time, as she is scheduled for R TKA in Sept., and does not want to exhaust her insurance benefit. Post Session:  No increase           Medications Last Reviewed:  7/10/2019    Next MD appt: clayton (September 19, 2019 is the R knee replacement surgery)    Updated Objective Findings:  L knee AROM is 0-100 °   6/28/19 Pt had 0-110 degrees passively. 7/3/19 L knee flexion 0-130 - minimal tightness at end of available range      LEFS:  6/19/19 :  35/80  7/10/19 :  41/80          TREATMENT:   THERAPEUTIC ACTIVITY: ( see chart below for minutes): Therapeutic activities per grid below to improve mobility and strength. Required minimal visual and verbal cues to promote dynamic balance in standing. THERAPEUTIC EXERCISE: (see chart below for minutes):  Exercises per grid below to improve mobility, strength, balance and coordination. Required minimal visual and verbal cues to promote proper body alignment, promote proper body posture and promote proper body mechanics. Progressed resistance, range, repetitions and complexity of movement as indicated. MANUAL THERAPY: (see chart below for minutes): Joint mobilization and Soft tissue mobilization was utilized and necessary because of the patient's restricted joint motion, painful spasm and restricted motion of soft tissue. MODALITIES: (see chart below for minutes):      see chart below for details on pain management. SELF CARE: (see chart below for minutes): Procedure(s) (per grid) utilized to improve and/or restore self-care/home management as related to functional activities . Required minimal visual, verbal and   cueing to facilitate activities of daily living skills.      Date: 6-19-19  (EVAL)  6-21-19  (Visit 2)  6-24-19 (visit 3) 6-26-19 (visit 4) 6-28-19 (visit 5) 7/1/19  Visit 6 7/3/19  Visit 7 7/5/19  Visit 8 7/8/19  Visit 9 7/1/0/19  Visit 10  DC   Modalities:  15 mins  15 min 15 min 15 min 15 min  15 mins 15 mins 15 min 15 mins   IFC with ice   15 mins  In long sitting In long sitting In long sitting L knee in long sitting L knee L knee L knee L knee                             Aquatic Exercise:      45 min   1.5#  55mins  1.5#  55 mins  2.5# 2.5# 45 min  50 mins  2.5#   Gait F/B/S/M      x6L F  X4 L B/S/M xf4L ea x4L ea x4L x6L ea   SLR Gait       x4L x4L x4L x6L   HSC Gait      X2L x4L  x4L x6   Rockettes         x2L pain in R knee    Heel Toe Raises      x20 x20 x20  x20   squats      x20 x20 x20 at front rail  x20   Hip  4-way      2x10 B 2x10 B 2x10B  x20 B   lunges        Reverse with foot on stair x10 B     clamshells             Hip Circles       x10 cw/ccw B x10 cw/ccw B  x10 cw/ccw B                Deep Well: bicycle      5 min 5 mins 5 min 5 min 5 mins   Deep Well: \"jacks\"       2 min 2 mins 2 min 2 min 2 min   Deep Well: \"scissors\"      2 min 2 mins 2 min 2 min 2 min   Deep Well; aquatic traction      3 min 5 min 2 min 2 mint 2 min                Core: paddles B UE \"rows\" in squat stance        x20     Core: paddles B UE horz abd/add  in squat stance        x20     Core: dumbbells B UE abd/add in squat stance        x20     Core: dumbbells B UE flex/extn in squat stance        x20                  Hamstring stretch       2xH30 B 2xH30  2H30 2xH30   Piriformis stretch                          Therapeutic Exercise: 15 mins  30 mins  30 min 30 min 35 min        bridges     3x10        Heel slides with sheet  x30  Repeat with strap  x20 Wall slides with 2.5# cuff 3x10 x30 with strap        Sit to stand  x5 reps without hands 2x5  2x10 with one pad in chair with UE support 3x10 with UE support 2x10 no UE support        HS stretch with strap   2x15SH            LAQ  x20             QS x20  x20            SLR   2x10    3x10 LLE        SAQ  2x20            Nustep   5 mins L1  6 min L4 moving forward to increase knee flexion 5 min L4 sliding seat forward 5 min L4        Slant board stretch   1 min hold on level 1 1 min hold on level 1 1 min hold on level 2        Standing hamstring curls   x20 B with 2.5# cuffs  2 laps with 2.5# cuff and ski poles for balance        Standing hip 3 way with blue band    x15 ea B blue band         Lateral walk   2 laps green 2 laps blue 2 laps blue        Heel raises   3x10 x30 x30        Manual Therapy:  10 mins  5 min 15 min 10 min        STM with scar massage   10 mins  Stretching into flexion  Stretching into flexion Stretching into flexion        Patella mobs   x30 x30 x30        Self Care:  10 mins             Educated patient on ice and elevation as well as pain medication recommendations for timing and safety in the home  10 mins                                         HEP:  Pt was given the above exercises for home and was educated on each. [employer] Portal  Treatment/Session Summary:    Response to Treatment:  Continued aquatics as seen above. Pt tolerates well with L knee , however continues to note R knee pain. Provided aquatic printout for HEP at d/c with plan to her to continue in aquatics setting to cont rehab for L knee and pre op prep for R TKA in a few months. · Communication/Consultation:  None today  · Equipment provided today:  None today  Recommendations/Intent for next treatment session: Today is patient final treatment session. She will continue independently with HEP, including aquatics at the [facility].   Pt is scheduled of R TKA in Sept.    Total Treatment Billable Duration:  60 mins   PT Patient Time In/Time Out  Time In: 1015  Time Out: [place] [address]26, PTA

## 2019-07-12 ENCOUNTER — APPOINTMENT (OUTPATIENT)
Dept: PHYSICAL THERAPY | Age: 69
End: 2019-07-12
Payer: MEDICARE

## 2019-07-15 ENCOUNTER — APPOINTMENT (OUTPATIENT)
Dept: PHYSICAL THERAPY | Age: 69
End: 2019-07-15
Payer: MEDICARE

## 2019-07-17 ENCOUNTER — APPOINTMENT (OUTPATIENT)
Dept: PHYSICAL THERAPY | Age: 69
End: 2019-07-17
Payer: MEDICARE

## 2019-07-19 ENCOUNTER — APPOINTMENT (OUTPATIENT)
Dept: PHYSICAL THERAPY | Age: 69
End: 2019-07-19
Payer: MEDICARE

## 2019-07-22 ENCOUNTER — APPOINTMENT (OUTPATIENT)
Dept: PHYSICAL THERAPY | Age: 69
End: 2019-07-22
Payer: MEDICARE

## 2019-07-24 ENCOUNTER — APPOINTMENT (OUTPATIENT)
Dept: PHYSICAL THERAPY | Age: 69
End: 2019-07-24
Payer: MEDICARE

## 2019-07-26 ENCOUNTER — APPOINTMENT (OUTPATIENT)
Dept: PHYSICAL THERAPY | Age: 69
End: 2019-07-26
Payer: MEDICARE

## 2019-09-05 ENCOUNTER — HOSPITAL ENCOUNTER (EMERGENCY)
Age: 69
Discharge: HOME | End: 2019-09-05
Attending: EMERGENCY MEDICINE
Payer: MEDICARE

## 2019-09-05 VITALS
DIASTOLIC BLOOD PRESSURE: 64 MMHG | SYSTOLIC BLOOD PRESSURE: 152 MMHG | RESPIRATION RATE: 16 BRPM | HEART RATE: 75 BPM | TEMPERATURE: 97.8 F | OXYGEN SATURATION: 99 %

## 2019-09-05 DIAGNOSIS — Z51.89 VISIT FOR WOUND CHECK: Primary | ICD-10-CM

## 2019-09-05 PROCEDURE — 74011250636 HC RX REV CODE- 250/636: Performed by: PHYSICIAN ASSISTANT

## 2019-09-05 PROCEDURE — 99283 EMERGENCY DEPT VISIT LOW MDM: CPT | Performed by: PHYSICIAN ASSISTANT

## 2019-09-05 PROCEDURE — 90471 IMMUNIZATION ADMIN: CPT | Performed by: PHYSICIAN ASSISTANT

## 2019-09-05 PROCEDURE — 90715 TDAP VACCINE 7 YRS/> IM: CPT | Performed by: PHYSICIAN ASSISTANT

## 2019-09-05 RX ORDER — CEPHALEXIN 500 MG/1
500 CAPSULE ORAL 4 TIMES DAILY
Qty: 28 CAP | Refills: 0 | Status: SHIPPED | OUTPATIENT
Start: 2019-09-05 | End: 2019-09-12

## 2019-09-05 RX ADMIN — TETANUS TOXOID, REDUCED DIPHTHERIA TOXOID AND ACELLULAR PERTUSSIS VACCINE, ADSORBED 0.5 ML: 5; 2.5; 8; 8; 2.5 SUSPENSION INTRAMUSCULAR at 14:19

## 2019-09-05 NOTE — ED NOTES
I have reviewed discharge instructions with the patient. The patient verbalized understanding. Patient left ED via Discharge Method: ambulatory to Home. Opportunity for questions and clarification provided. Patient given 1 scripts. To continue your aftercare when you leave the hospital, you may receive an automated call from our care team to check in on how you are doing. This is a free service and part of our promise to provide the best care and service to meet your aftercare needs.  If you have questions, or wish to unsubscribe from this service please call [phone]. Thank you [facility].

## 2019-09-05 NOTE — DISCHARGE INSTRUCTIONS
Patient Education   Wash two-three times daily with soap and water, blot dry, apply neosporin and a clean dressing. Watch for redness, swelling, pus, increasing pain, fever and return if any of those symptoms began. Return to the ED if worse. Wound Care: After Your Visit to the Emergency Room  Your Care Instructions  The care you need depends on the type of wound you have. Taking good care of your wound at home will help it heal quickly and will reduce your chance of infection. Even though you have been released from the emergency room, you still need to watch for any problems. The doctor carefully checked you. But sometimes problems can develop later. If you have new symptoms, or if your symptoms do not get better, return to the emergency room or call your doctor right away. A visit to the emergency room is only one step in your treatment. Even if you feel better, you still need to do what your doctor recommends, such as going to all suggested follow-up appointments and taking medicines exactly as directed. This will help you recover and help prevent future problems. How can you care for yourself at home? · Clean the area with soap and water 2 times a day, or as your doctor tells you. Don't use hydrogen peroxide or alcohol, which can slow healing. ¨ Unless your doctor gives you other directions, cover the wound with a thin layer of antibiotic ointment, such as bacitracin, and a bandage. Do not use an ointment that contains neomycin, because it can irritate the skin. ¨ Apply more ointment and replace the bandage as your doctor tells you. ¨ If the bandage is stuck to a scab, soak it in warm water to soften the scab. This will make the bandage easier to remove. · Ask your doctor if you can take an over-the-counter pain medicine. Do not take two or more pain medicines at the same time unless the doctor told you to.   · Some pain is normal with a wound, but do not ignore pain that is getting worse instead of better. You could have an infection. · Your doctor may have closed your wound with stitches (sutures), staples, or skin glue. ¨ If you have stitches, your doctor may remove them after several days to 2 weeks. Or you may have stitches that dissolve on their own. ¨ If you have staples, your doctor may remove them after 7 to 10 days. ¨ If your wound was closed with skin glue, the glue will wear off in a few days to 2 weeks. When should you call for help? Return to the emergency room now if:  · You have signs of infection, such as:  ¨ Increased pain, swelling, warmth, or redness around the wound. ¨ Red streaks leading from the wound. ¨ Pus draining from the wound. ¨ Swollen lymph nodes in your neck, armpits, or groin. ¨ A fever. · The wound starts to bleed, and blood soaks through the bandage. (Oozing small amounts of blood is normal.)  Call your doctor today if:  · The wound is not getting better each day. Where can you learn more? Go to [employer]  Enter P907 in the search box to learn more about \"Wound Care: After Your Visit to the Emergency Room. \"   © 9478-1199 [employer], [employer]orporated. Care instructions adapted under license by [facility] [employer] (which disclaims liability or warranty for this information). This care instruction is for use with your licensed healthcare professional. If you have questions about a medical condition or this instruction, always ask your healthcare professional. [place] [address] any warranty or liability for your use of this information.   Content Version: 9.3.56818; Last Revised: July 22, 2010

## 2019-09-05 NOTE — ED PROVIDER NOTES
Patient is here with an abscess that was diagnosed to proximately 10 days ago while she was on vacation. She was placed on antibiotics, Keflex, which she has been taking and went to her primary care physician for follow-up and they sent her here to have us nany it. She is scheduled for right knee surgery and wanted to make sure that the abscess was cleared up prior to having the surgery. She states it is not painful and she is not having a fever, swelling to the area, nausea, vomiting, chest pain, shortness of breath, abdominal pain, dizziness, weakness or other new symptoms. The history is provided by the patient and the spouse. Abscess    This is a new problem. The current episode started more than 1 week ago. The problem has been rapidly improving. The problem is associated with an unknown factor. There has been no fever. The pain is at a severity of 0/10. The patient is experiencing no pain. Treatments tried: Keflex. The treatment provided moderate relief. Past Medical History:   Diagnosis Date    Breast cancer ([facility] [address] 2007    right breast    DJD (degenerative joint disease) 04/30/2013    Bilateral knees    Essential hypertension, benign 04/30/2013    on med for control     Generalized osteoarthrosis, involving multiple sites 4/30/2013    Hand eczema 4/30/2013    Heart murmur     echo 9/18/18: estimated EF 55%    Mixed hyperlipidemia 04/30/2013    on med    Morbid obesity ([address] 04/30/2013    Routine general medical examination at a health care facility 4/30/2013    Type II or unspecified type diabetes mellitus without mention of complication, not stated as uncontrolled 04/30/2013    oral and insulin meds; checks bs 4 x day; average bs ranges (140-200); last A1C=7.4 on 12/18/18    Unspecified sleep apnea 04/30/2013    c-pap; instructed to bring dos.         Past Surgical History:   Procedure Laterality Date    BREAST SURGERY PROCEDURE UNLISTED  08/2010    let breast cyst aspirated    HX BREAST LUMPECTOMY Right 2007    cancer    HX CHOLECYSTECTOMY  1981    HX KNEE REPLACEMENT Left 05/2019    HX TONSILLECTOMY  ~1950    HX TUBAL LIGATION  1984         Family History:   Problem Relation Age of Onset    Heart Disease Mother     Elevated Lipids Mother     Hypertension Mother     Diabetes Mother         type 2    Dementia Father 80    Heart Disease Father     Elevated Lipids Father     Hypertension Father     Diabetes Father         type 2    No Known Problems Sister     No Known Problems Brother     Breast Cancer Sister     Stroke Maternal Grandmother     No Known Problems Daughter     No Known Problems Son        Social History     Socioeconomic History    Marital status:      Spouse name: Not on file    Number of children: Not on file    Years of education: Not on file    Highest education level: Not on file   Occupational History    Occupation: retired     Employer: NOT EMPLOYED   Social Needs    Financial resource strain: Not on file    Food insecurity:     Worry: Not on file     Inability: Not on file    Transportation needs:     Medical: Not on file     Non-medical: Not on file   Tobacco Use    Smoking status: Never Smoker    Smokeless tobacco: Never Used   Substance and Sexual Activity    Alcohol use: No     Alcohol/week: 0.0 standard drinks    Drug use: No    Sexual activity: Yes     Partners: Male     Birth control/protection: Surgical   Lifestyle    Physical activity:     Days per week: 0 days     Minutes per session: 0 min    Stress:  Only a little   Relationships    Social connections:     Talks on phone: More than three times a week     Gets together: More than three times a week     Attends [employer] service: More than 4 times per year     Active member of club or organization: No     Attends meetings of clubs or organizations: Never     Relationship status:     Intimate partner violence:     Fear of current or ex partner: No     Emotionally abused: No     Physically abused: No     Forced sexual activity: No   Other Topics Concern     Service No    Blood Transfusions No    Caffeine Concern No    Occupational Exposure No    Hobby Hazards No    Sleep Concern Yes    Stress Concern No    Weight Concern Yes    Special Diet Not Asked    Back Care Not Asked    Exercise No     Comment: limted due to knees    Bike Helmet Not Asked    Seat Belt Yes    Self-Exams Yes   Social History Narrative    Lives with  ivis kruse since 1979. Son lives in [facility]. Daughter lives in [place]. ALLERGIES: Ambien [zolpidem]; Erythromycin; and Norvasc [amlodipine]    Review of Systems   Constitutional: Negative. HENT: Negative. Eyes: Negative. Respiratory: Negative. Cardiovascular: Negative. Gastrointestinal: Negative. Genitourinary: Negative. Musculoskeletal: Negative. Skin: Positive for wound. Neurological: Negative. Psychiatric/Behavioral: Negative. All other systems reviewed and are negative. Vitals:    09/05/19 1207   BP: 150/61   Pulse: 69   Resp: 16   Temp: 97.8 °F (36.6 °C)   SpO2: 98%            Physical Exam   Constitutional: She is oriented to person, place, and time. She appears well-developed and well-nourished. HENT:   Head: Normocephalic and atraumatic. Right Ear: External ear normal.   Left Ear: External ear normal.   Nose: Nose normal.   Mouth/Throat: Oropharynx is clear and moist.   Eyes: Pupils are equal, round, and reactive to light. Conjunctivae and EOM are normal.   Neck: Normal range of motion. Neck supple. Cardiovascular: Normal rate, regular rhythm, normal heart sounds and intact distal pulses. Pulmonary/Chest: Effort normal and breath sounds normal.   Abdominal: Soft. Bowel sounds are normal.   Genitourinary:         Genitourinary Comments: No Robert, RN in to assist with exam   Musculoskeletal: Normal range of motion.    Neurological: She is alert and oriented to person, place, and time. She has normal reflexes. Skin: Skin is warm and dry. Psychiatric: She has a normal mood and affect. Her behavior is normal. Judgment and thought content normal.   Nursing note and vitals reviewed. MDM  Number of Diagnoses or Management Options  Visit for wound check:   Risk of Complications, Morbidity, and/or Mortality  Presenting problems: moderate  Diagnostic procedures: moderate  Management options: moderate    Patient Progress  Patient progress: improved         Procedures    The patient was observed in the ED. Results Reviewed: The area was debrided, exudate removed, very minimal bleeding following that, wound is granulating from the inside out and sterile dressing with Neosporin applied. Patient was instructed on wound care and I have extended her Keflex for another 7 days. Have advised her to call Dr. Mari Brandon and get in for follow-up to see if he wants to delay surgery or not. Patient should return if worsening in any way. There is nothing to drain, the area was debrided. She is stable for discharge and ambulatory out of the ER without difficulty. I discussed the results of all labs, procedures, radiographs, and treatments with the patient and available family. Treatment plan is agreed upon and the patient is ready for discharge. All voiced understanding of the discharge plan and medication instructions or changes as appropriate. Questions about treatment in the ED were answered. All were encouraged to return should symptoms worsen or new problems develop.

## 2019-09-05 NOTE — ED TRIAGE NOTES
Pt ambulatory to triage without complications. Pt states about 10 days ago she went to urgent care in [place] for a \"boil\" to her right buttock and was placed on ABT. Pt states she went to her PCP and they told her they can't nany the area here and spoke to Dr. Yeny Mcdonald and he states he would examine the pt when she arrives here. Pt states the area had some drainage but is not drainage at this time. Pt denies fever, n/v/d. Pt states ABT on is keflex and continues taking through tomorrow.

## 2020-01-23 ENCOUNTER — HOSPITAL ENCOUNTER (OUTPATIENT)
Dept: SURGERY | Age: 70
Discharge: HOME | End: 2020-01-23
Payer: MEDICARE

## 2020-01-23 VITALS
TEMPERATURE: 98.6 F | HEART RATE: 75 BPM | HEIGHT: 64 IN | SYSTOLIC BLOOD PRESSURE: 150 MMHG | BODY MASS INDEX: 41.66 KG/M2 | WEIGHT: 244 LBS | RESPIRATION RATE: 18 BRPM | DIASTOLIC BLOOD PRESSURE: 75 MMHG | OXYGEN SATURATION: 97 %

## 2020-01-23 LAB
ANION GAP SERPL CALC-SCNC: 6 MMOL/L (ref 7–16)
APTT PPP: 29.6 SEC (ref 24.7–39.8)
BACTERIA SPEC CULT: ABNORMAL
BASOPHILS # BLD: 0.1 K/UL (ref 0–0.2)
BASOPHILS NFR BLD: 1 % (ref 0–2)
BUN SERPL-MCNC: 28 MG/DL (ref 8–23)
CALCIUM SERPL-MCNC: 9.8 MG/DL (ref 8.3–10.4)
CHLORIDE SERPL-SCNC: 110 MMOL/L (ref 98–107)
CO2 SERPL-SCNC: 23 MMOL/L (ref 21–32)
CREAT SERPL-MCNC: 1.2 MG/DL (ref 0.6–1)
DIFFERENTIAL METHOD BLD: ABNORMAL
EOSINOPHIL # BLD: 0.2 K/UL (ref 0–0.8)
EOSINOPHIL NFR BLD: 2 % (ref 0.5–7.8)
ERYTHROCYTE [DISTWIDTH] IN BLOOD BY AUTOMATED COUNT: 13.3 % (ref 11.9–14.6)
EST. AVERAGE GLUCOSE BLD GHB EST-MCNC: 189 MG/DL
GLUCOSE BLD STRIP.AUTO-MCNC: 127 MG/DL (ref 65–100)
GLUCOSE SERPL-MCNC: 127 MG/DL (ref 65–100)
HBA1C MFR BLD: 8.2 %
HCT VFR BLD AUTO: 36.3 % (ref 35.8–46.3)
HGB BLD-MCNC: 11.4 G/DL (ref 11.7–15.4)
IMM GRANULOCYTES # BLD AUTO: 0.1 K/UL (ref 0–0.5)
IMM GRANULOCYTES NFR BLD AUTO: 1 % (ref 0–5)
INR PPP: 0.9
LYMPHOCYTES # BLD: 3 K/UL (ref 0.5–4.6)
LYMPHOCYTES NFR BLD: 25 % (ref 13–44)
MCH RBC QN AUTO: 27.1 PG (ref 26.1–32.9)
MCHC RBC AUTO-ENTMCNC: 31.4 G/DL (ref 31.4–35)
MCV RBC AUTO: 86.2 FL (ref 79.6–97.8)
MONOCYTES # BLD: 0.8 K/UL (ref 0.1–1.3)
MONOCYTES NFR BLD: 7 % (ref 4–12)
NEUTS SEG # BLD: 7.6 K/UL (ref 1.7–8.2)
NEUTS SEG NFR BLD: 65 % (ref 43–78)
NRBC # BLD: 0 K/UL (ref 0–0.2)
PLATELET # BLD AUTO: 480 K/UL (ref 150–450)
PMV BLD AUTO: 9 FL (ref 9.4–12.3)
POTASSIUM SERPL-SCNC: 4.7 MMOL/L (ref 3.5–5.1)
PROTHROMBIN TIME: 12.6 SEC (ref 11.7–14.5)
RBC # BLD AUTO: 4.21 M/UL (ref 4.05–5.2)
SERVICE CMNT-IMP: ABNORMAL
SODIUM SERPL-SCNC: 139 MMOL/L (ref 136–145)
WBC # BLD AUTO: 11.7 K/UL (ref 4.3–11.1)

## 2020-01-23 PROCEDURE — 87641 MR-STAPH DNA AMP PROBE: CPT

## 2020-01-23 PROCEDURE — 85025 COMPLETE CBC W/AUTO DIFF WBC: CPT

## 2020-01-23 PROCEDURE — 80048 BASIC METABOLIC PNL TOTAL CA: CPT

## 2020-01-23 PROCEDURE — 83036 HEMOGLOBIN GLYCOSYLATED A1C: CPT

## 2020-01-23 PROCEDURE — 85730 THROMBOPLASTIN TIME PARTIAL: CPT

## 2020-01-23 PROCEDURE — 85610 PROTHROMBIN TIME: CPT

## 2020-01-23 PROCEDURE — 82962 GLUCOSE BLOOD TEST: CPT

## 2020-01-23 RX ORDER — ASPIRIN 81 MG/1
81 TABLET ORAL DAILY
COMMUNITY
End: 2020-02-07

## 2020-01-23 RX ORDER — ACETAMINOPHEN 325 MG/1
TABLET ORAL
COMMUNITY
End: 2020-12-04 | Stop reason: ALTCHOICE

## 2020-01-23 NOTE — PERIOP NOTES
Patient verified name and . Order for consent found in EHR and matches case posting; patient verified. Type 3 surgery, PAT walk-in joint assessment complete. Labs per surgeon: CBC,BMP, PT/PTT, Hemoglobin A1c; results pending. T&S DOS and POC glucose DOS; order signed and held in EHR. Labs per anesthesia protocol: no additional  EKG: completed 19; results within anesthesia limits. Tracing placed on chart. Patient has known cardiac murmur. Patient denies CP, SOB, and edema. Echo dated 18 located in EHR if needed for anesthesia reference. MRSA/MSSA swab collected; pharmacy to review and dose antibiotic as appropriate. Patient has incentive spirometer from previous joint replacement. Hospital approved surgical skin cleanser and instructions to return bottle on DOS given per hospital policy. Patient provided with handouts including Guide to Surgery, Pain Management, Hand Hygiene, Blood Transfusion Education, and [facility] Brochure. Patient answered medical/surgical history questions at their best of ability. All prior to admission medications documented in [facility]. Original medication prescription bottle visualized during patient appointment. Patient instructed to hold all vitamins, supplements, herbals 3 weeks prior to surgery and NSAIDS 5 days prior to surgery. Patient instructed to continue daily 81 mg ASA. Patient teach back successful and patient demonstrates knowledge of instruction.

## 2020-01-23 NOTE — PERIOP NOTES
PLEASE CONTINUE TAKING ALL PRESCRIPTION MEDICATIONS UP TO THE DAY OF SURGERY UNLESS OTHERWISE DIRECTED BELOW. DISCONTINUE all vitamins, herbals and supplements 21 days prior to surgery. DISCONTINUE Non-Steriodal Anti-Inflammatory (NSAIDS) such as Advil, Ibuprofen, and Aleve 5 days prior to surgery. Home Medications to HOLD      All vitamins, supplements, and herbals stop NOW. All NSAIDs such as Advil, Aleve, Ibuprofen, Diclofenac, Naproxen, etc. Stop 5 days prior to surgery. Home Medications to take  the day of surgery   Tylenol if needed, 81 mg Aspirin, Diltiazem        Comments   The day before surgery take 28 units of NPH insulin (both AM and PM dose). Take 17 units of NPH insulin on the day of surgery if glucose is greater than 120, if glucose is less than 120 then HOLD AM dose of NPH insulin. *Bring Metformin ER, CPAP, incentive spirometer, and bottle of soap (Dynahex) to the hospital.        Please do not bring home medications with you on the day of surgery unless otherwise directed by your nurse. If you are instructed to bring home medications, please give them to your nurse as they will be administered by the nursing staff. If you have any questions, please call [facility] [phone] or [facility] [phone]. Copy of above instructions given to patient.

## 2020-01-23 NOTE — PERIOP NOTES
Recent Results (from the past 12 hour(s))   GLUCOSE, POC    Collection Time: 01/23/20  9:59 AM   Result Value Ref Range    Glucose (POC) 127 (H) 65 - 100 mg/dL   CBC WITH AUTOMATED DIFF    Collection Time: 01/23/20 10:00 AM   Result Value Ref Range    WBC 11.7 (H) 4.3 - 11.1 K/uL    RBC 4.21 4.05 - 5.2 M/uL    HGB 11.4 (L) 11.7 - 15.4 g/dL    HCT 36.3 35.8 - 46.3 %    MCV 86.2 79.6 - 97.8 FL    MCH 27.1 26.1 - 32.9 PG    MCHC 31.4 31.4 - 35.0 g/dL    RDW 13.3 11.9 - 14.6 %    PLATELET 619 (H) 080 - 450 K/uL    MPV 9.0 (L) 9.4 - 12.3 FL    ABSOLUTE NRBC 0.00 0.0 - 0.2 K/uL    DF AUTOMATED      NEUTROPHILS 65 43 - 78 %    LYMPHOCYTES 25 13 - 44 %    MONOCYTES 7 4.0 - 12.0 %    EOSINOPHILS 2 0.5 - 7.8 %    BASOPHILS 1 0.0 - 2.0 %    IMMATURE GRANULOCYTES 1 0.0 - 5.0 %    ABS. NEUTROPHILS 7.6 1.7 - 8.2 K/UL    ABS. LYMPHOCYTES 3.0 0.5 - 4.6 K/UL    ABS. MONOCYTES 0.8 0.1 - 1.3 K/UL    ABS. EOSINOPHILS 0.2 0.0 - 0.8 K/UL    ABS. BASOPHILS 0.1 0.0 - 0.2 K/UL    ABS. IMM.  GRANS. 0.1 0.0 - 0.5 K/UL   HEMOGLOBIN A1C WITH EAG    Collection Time: 01/23/20 10:00 AM   Result Value Ref Range    Hemoglobin A1c 8.2 %    Est. average glucose 843 mg/dL   METABOLIC PANEL, BASIC    Collection Time: 01/23/20 10:00 AM   Result Value Ref Range    Sodium 139 136 - 145 mmol/L    Potassium 4.7 3.5 - 5.1 mmol/L    Chloride 110 (H) 98 - 107 mmol/L    CO2 23 21 - 32 mmol/L    Anion gap 6 (L) 7 - 16 mmol/L    Glucose 127 (H) 65 - 100 mg/dL    BUN 28 (H) 8 - 23 MG/DL    Creatinine 1.20 (H) 0.6 - 1.0 MG/DL    GFR est AA 58 (L) >60 ml/min/1.73m2    GFR est non-AA 48 (L) >60 ml/min/1.73m2    Calcium 9.8 8.3 - 10.4 MG/DL   PROTHROMBIN TIME + INR    Collection Time: 01/23/20 10:00 AM   Result Value Ref Range    Prothrombin time 12.6 11.7 - 14.5 sec    INR 0.9     PTT    Collection Time: 01/23/20 10:00 AM   Result Value Ref Range    aPTT 29.6 24.7 - 39.8 SEC

## 2020-01-23 NOTE — PERIOP NOTES
Labs dated 1/23/20 routed via [facility] to patients PCP, Cassidy Marcum, per Dr. Bowen Hutchinson request. Lab results also routed via [facility] to surgeon, Dr. Mellisa Perez, per anesthesia protocol. Yun Chang NP over joint camp, made aware of elevated WBC and A1c results; no new orders received. The below lab results are within anesthesia limits. No further action is required. Recent Results (from the past 12 hour(s))   GLUCOSE, POC    Collection Time: 01/23/20  9:59 AM   Result Value Ref Range    Glucose (POC) 127 (H) 65 - 100 mg/dL   CBC WITH AUTOMATED DIFF    Collection Time: 01/23/20 10:00 AM   Result Value Ref Range    WBC 11.7 (H) 4.3 - 11.1 K/uL    RBC 4.21 4.05 - 5.2 M/uL    HGB 11.4 (L) 11.7 - 15.4 g/dL    HCT 36.3 35.8 - 46.3 %    MCV 86.2 79.6 - 97.8 FL    MCH 27.1 26.1 - 32.9 PG    MCHC 31.4 31.4 - 35.0 g/dL    RDW 13.3 11.9 - 14.6 %    PLATELET 214 (H) 439 - 450 K/uL    MPV 9.0 (L) 9.4 - 12.3 FL    ABSOLUTE NRBC 0.00 0.0 - 0.2 K/uL    DF AUTOMATED      NEUTROPHILS 65 43 - 78 %    LYMPHOCYTES 25 13 - 44 %    MONOCYTES 7 4.0 - 12.0 %    EOSINOPHILS 2 0.5 - 7.8 %    BASOPHILS 1 0.0 - 2.0 %    IMMATURE GRANULOCYTES 1 0.0 - 5.0 %    ABS. NEUTROPHILS 7.6 1.7 - 8.2 K/UL    ABS. LYMPHOCYTES 3.0 0.5 - 4.6 K/UL    ABS. MONOCYTES 0.8 0.1 - 1.3 K/UL    ABS. EOSINOPHILS 0.2 0.0 - 0.8 K/UL    ABS. BASOPHILS 0.1 0.0 - 0.2 K/UL    ABS. IMM.  GRANS. 0.1 0.0 - 0.5 K/UL   HEMOGLOBIN A1C WITH EAG    Collection Time: 01/23/20 10:00 AM   Result Value Ref Range    Hemoglobin A1c 8.2 %    Est. average glucose 564 mg/dL   METABOLIC PANEL, BASIC    Collection Time: 01/23/20 10:00 AM   Result Value Ref Range    Sodium 139 136 - 145 mmol/L    Potassium 4.7 3.5 - 5.1 mmol/L    Chloride 110 (H) 98 - 107 mmol/L    CO2 23 21 - 32 mmol/L    Anion gap 6 (L) 7 - 16 mmol/L    Glucose 127 (H) 65 - 100 mg/dL    BUN 28 (H) 8 - 23 MG/DL    Creatinine 1.20 (H) 0.6 - 1.0 MG/DL    GFR est AA 58 (L) >60 ml/min/1.73m2    GFR est non-AA 48 (L) >60 ml/min/1.73m2    Calcium 9.8 8.3 - 10.4 MG/DL   PROTHROMBIN TIME + INR    Collection Time: 01/23/20 10:00 AM   Result Value Ref Range    Prothrombin time 12.6 11.7 - 14.5 sec    INR 0.9     PTT    Collection Time: 01/23/20 10:00 AM   Result Value Ref Range    aPTT 29.6 24.7 - 39.8 SEC

## 2020-01-24 NOTE — ADVANCED PRACTICE NURSE
Total Joint Surgery Preoperative Chart Review      Patient ID:  Hossein Dodd  [id number]  83 y.o.  1950  Surgeon: Dr. Latia Paula  Date of Surgery: 2/6/2020  Procedure: Total Right Knee Arthroplasty  Primary Care Physician: Horacio Reyes [phone]  Specialty Physician(s):      Subjective:   Hossein Dodd is a 81 y.o. WHITE OR  female who presents for preoperative evaluation for Total Right Knee arthroplasty. This is a preoperative chart review note based on data collected by the nurse at the surgical Pre-Assessment visit. Past Medical History:   Diagnosis Date    Breast cancer ([facility] [address] 2007    right breast    DJD (degenerative joint disease) 04/30/2013    Bilateral knees    Essential hypertension, benign 04/30/2013    on med for control     Generalized osteoarthrosis, involving multiple sites 4/30/2013    Hand eczema 4/30/2013    Heart murmur     echo 9/18/18: estimated EF 55%    Mixed hyperlipidemia 04/30/2013    on med    Morbid obesity ([facility] [address] 04/30/2013    Routine general medical examination at a [facility] care facility 4/30/2013    Type II or unspecified type diabetes mellitus without mention of complication, not stated as uncontrolled 04/30/2013    oral and insulin meds; checks bs 4 x day; average bs ranges (140-200); last A1C=8.2 on 1/23/20. No problems with hypoglycemia.  Unspecified sleep apnea 04/30/2013    c-pap; instructed to bring dos.        Past Surgical History:   Procedure Laterality Date    BREAST SURGERY PROCEDURE UNLISTED  08/2010    let breast cyst aspirated    HX BREAST LUMPECTOMY Right 2007    cancer    HX CHOLECYSTECTOMY  1981    HX KNEE REPLACEMENT Left 05/2019    HX TONSILLECTOMY  ~1950    HX TUBAL LIGATION  1984     Family History   Problem Relation Age of Onset    Heart Disease Mother     Elevated Lipids Mother     Hypertension Mother     Diabetes Mother         type 2    Dementia Father 80    Heart Disease Father     Elevated Lipids Father    Judieth Lubna Hypertension Father     Diabetes Father         type 2    No Known Problems Sister     No Known Problems Brother     Breast Cancer Sister     Stroke Maternal Grandmother     No Known Problems Daughter     No Known Problems Son       Social History     Tobacco Use    Smoking status: Never Smoker    Smokeless tobacco: Never Used   Substance Use Topics    Alcohol use: No     Alcohol/week: 0.0 standard drinks       Prior to Admission medications    Medication Sig Start Date End Date Taking? Authorizing Provider   aspirin delayed-release 81 mg tablet Take 81 mg by mouth daily. Yes Provider, Historical   acetaminophen (TYLENOL) 325 mg tablet Take  by mouth every four (4) hours as needed for Pain. Yes Provider, Historical   metFORMIN ER (GLUCOPHAGE XR) 500 mg tablet Take 1 Tab by mouth Before breakfast and dinner. Indications: type 2 diabetes mellitus  Patient taking differently: Take 500 mg by mouth Before breakfast and dinner. Non-formulary medication. Patient instructed to bring medication to the hospital on the DOS, in the original bottle, and give to nurse. Indications: type 2 diabetes mellitus 1/8/20  Yes Villa Balderas PA-C   ergocalciferol (ERGOCALCIFEROL) 50,000 unit capsule Take 1 Cap by mouth every seven (7) days. Patient taking differently: Take 50,000 Units by mouth every seven (7) days. Every Saturday 12/19/19  Yes Villa Balderas PA-C   dilTIAZem CD (CARDIZEM CD) 240 mg ER capsule Take 1 Cap by mouth daily. 12/4/19  Yes Bessie Israel NP   rosuvastatin (CRESTOR) 10 mg tablet Take 1 Tab by mouth nightly. 12/4/19  Yes Bessie Israel NP   lisinopril-hydroCHLOROthiazide (PRINZIDE, ZESTORETIC) 20-12.5 mg per tablet Take 1 Tab by mouth daily. Indications: high blood pressure 12/4/19  Yes Tamela GLYNN NP   insulin NPH (NOVOLIN N NPH U-100 INSULIN) 100 unit/mL injection 30 Units by SubCUTAneous route Before breakfast and dinner.   Patient taking differently: 35 Units by SubCUTAneous route Before breakfast and dinner. Patient instructed to take 80% or 28 units of NPH insulin the day before surgery (both AM and PM dose). Patient instructed to take 1/2 or 17 units of NPH insulin on the day of surgery if glucose is greater than 120, if glucose is less than 120 then HOLD AM dose of NPH insulin. 6/18/19  Yes Jean Claude Balderas PA-C   insulin regular (NOVOLIN R REGULAR U-100 INSULN) 100 unit/mL injection 18 units before breakfast, 20 units before lunch and supper plus correction 1/20>150, max daily dose 60 units  Patient taking differently: 20 units before breakfast, 20 units before lunch and supper plus correction 1/20>150, max daily dose 60 units 6/18/19  Yes Carina Balderas PA-C   glucose blood VI test strips (ONETOUCH VERIO) strip Check glucose 4 times daily, E11.65 6/18/19  Yes Jean Claude Balderas PA-C   lancets (ONE TOUCH DELICA) 33 gauge misc Check glucose 4 times daily, E11.65 6/18/19  Yes Jean Claude Balderas PA-C   Insulin Syringe-Needle U-100 0.5 mL 31 gauge x 5/16\" syrg Use with insulin up to 5 times daily, E11.65 1/31/19  Yes Carina Balderas PA-C   melatonin tab tablet Take 5 mg by mouth nightly. Yes Provider, Historical     Allergies   Allergen Reactions    Ambien [Zolpidem] Palpitations     Ambien CR heart race.  ambien-hypnotics hallucinations    Erythromycin Other (comments)     Increased heart rate     Norvasc [Amlodipine] Other (comments)     Leg swelling          Objective:     Physical Exam:   Patient Vitals for the past 24 hrs:   Temp Pulse Resp BP SpO2   01/23/20 0949 98.6 °F (37 °C) 75 18 150/75 97 %       ECG:    EKG Results     None          Data Review:   Labs:   Recent Results (from the past 24 hour(s))   GLUCOSE, POC    Collection Time: 01/23/20  9:59 AM   Result Value Ref Range    Glucose (POC) 127 (H) 65 - 100 mg/dL   CBC WITH AUTOMATED DIFF    Collection Time: 01/23/20 10:00 AM   Result Value Ref Range    WBC 11.7 (H) 4.3 - 11.1 K/uL    RBC 4.21 4.05 - 5.2 M/uL    HGB 11.4 (L) 11.7 - 15.4 g/dL    HCT 36.3 35.8 - 46.3 %    MCV 86.2 79.6 - 97.8 FL    MCH 27.1 26.1 - 32.9 PG    MCHC 31.4 31.4 - 35.0 g/dL    RDW 13.3 11.9 - 14.6 %    PLATELET 336 (H) 013 - 450 K/uL    MPV 9.0 (L) 9.4 - 12.3 FL    ABSOLUTE NRBC 0.00 0.0 - 0.2 K/uL    DF AUTOMATED      NEUTROPHILS 65 43 - 78 %    LYMPHOCYTES 25 13 - 44 %    MONOCYTES 7 4.0 - 12.0 %    EOSINOPHILS 2 0.5 - 7.8 %    BASOPHILS 1 0.0 - 2.0 %    IMMATURE GRANULOCYTES 1 0.0 - 5.0 %    ABS. NEUTROPHILS 7.6 1.7 - 8.2 K/UL    ABS. LYMPHOCYTES 3.0 0.5 - 4.6 K/UL    ABS. MONOCYTES 0.8 0.1 - 1.3 K/UL    ABS. EOSINOPHILS 0.2 0.0 - 0.8 K/UL    ABS. BASOPHILS 0.1 0.0 - 0.2 K/UL    ABS. IMM. GRANS. 0.1 0.0 - 0.5 K/UL   HEMOGLOBIN A1C WITH EAG    Collection Time: 01/23/20 10:00 AM   Result Value Ref Range    Hemoglobin A1c 8.2 %    Est. average glucose 897 mg/dL   METABOLIC PANEL, BASIC    Collection Time: 01/23/20 10:00 AM   Result Value Ref Range    Sodium 139 136 - 145 mmol/L    Potassium 4.7 3.5 - 5.1 mmol/L    Chloride 110 (H) 98 - 107 mmol/L    CO2 23 21 - 32 mmol/L    Anion gap 6 (L) 7 - 16 mmol/L    Glucose 127 (H) 65 - 100 mg/dL    BUN 28 (H) 8 - 23 MG/DL    Creatinine 1.20 (H) 0.6 - 1.0 MG/DL    GFR est AA 58 (L) >60 ml/min/1.73m2    GFR est non-AA 48 (L) >60 ml/min/1.73m2    Calcium 9.8 8.3 - 10.4 MG/DL   MSSA/MRSA SC BY PCR, NASAL SWAB    Collection Time: 01/23/20 10:00 AM   Result Value Ref Range    Special Requests: NO SPECIAL REQUESTS      Culture result: (A)       MRSA target DNA detected, SA target DNA detected. A positive test result does not necessarily indicate the presence of viable organisms. It is however, presumptive for the presence of MRSA or SA.    PROTHROMBIN TIME + INR    Collection Time: 01/23/20 10:00 AM   Result Value Ref Range    Prothrombin time 12.6 11.7 - 14.5 sec    INR 0.9     PTT    Collection Time: 01/23/20 10:00 AM   Result Value Ref Range    aPTT 29.6 24.7 - 39.8 SEC         Problem List:  )  Patient Active Problem List   Diagnosis Code    Hand eczema L30.9    DJD (degenerative joint disease) M19.90    Essential hypertension, benign I10    Mixed hyperlipidemia E78.2    Uncontrolled type 2 diabetes mellitus without complication, with long-term current use of insulin ([facility] [address] E11.65, Z79.4    [place] on CPAP G47.33, Z99.89    Morbid obesity ([address] E66.01    Generalized osteoarthrosis, involving multiple sites M15.9    Breast cancer ([facility] [address] C50.919    Osteoarthritis of left knee M17.12    Elevated serum creatinine R79.89    Abnormal urinalysis R82.90    Type 2 diabetes with nephropathy (HCC) E11.21    Arthritis of knee, left M17.12    S/P total knee arthroplasty Z96.659    Total knee replacement status, left Z96.652    Hypercalcemia E83.52       Total Joint Surgery Pre-Assessment Recommendations:           Patient is to wear home CPAP during hospitalization.      Signed By: BILLY Sweet    January 24, 2020

## 2020-01-31 NOTE — H&P
H&P    Patient ID:  Brad Snow  [id number]  59 y.o.  1950  Surgeon:  Mindy Jimenez MD  Date of Surgery: * No surgery date entered *  Procedure: Right Knee Total Arthroplasty  Primary Care Physician: Alondra Ramírez NP        Subjective:  Brad Snow is a 81 y.o. WHITE OR  female who presents with Right Knee pain. She has history of Right Knee pain for several months. Symptoms worse with walking long distances and relieved with rest. Conservative treatment consisting of  activity modification and injections have not helped. The patient lives with their family. The patients goal after surgery is improved pain and function. Past Medical History:   Diagnosis Date    Breast cancer ([facility] [address] 2007    right breast    DJD (degenerative joint disease) 04/30/2013    Bilateral knees    Essential hypertension, benign 04/30/2013    on med for control     Generalized osteoarthrosis, involving multiple sites 4/30/2013    Hand eczema 4/30/2013    Heart murmur     echo 9/18/18: estimated EF 55%    Mixed hyperlipidemia 04/30/2013    on med    Morbid obesity ([facility] [address] 04/30/2013    Routine general medical examination at a [facility] care facility 4/30/2013    Type II or unspecified type diabetes mellitus without mention of complication, not stated as uncontrolled 04/30/2013    oral and insulin meds; checks bs 4 x day; average bs ranges (140-200); last A1C=8.2 on 1/23/20. No problems with hypoglycemia.  Unspecified sleep apnea 04/30/2013    c-pap; instructed to bring dos.        Past Surgical History:   Procedure Laterality Date    BREAST SURGERY PROCEDURE UNLISTED  08/2010    let breast cyst aspirated    HX BREAST LUMPECTOMY Right 2007    cancer    HX CHOLECYSTECTOMY  1981    HX KNEE REPLACEMENT Left 05/2019    HX TONSILLECTOMY  ~1950    HX TUBAL LIGATION  1984     Family History   Problem Relation Age of Onset    Heart Disease Mother     Elevated Lipids Mother     Hypertension Mother  Diabetes Mother         type 2    Dementia Father 80    Heart Disease Father     Elevated Lipids Father     Hypertension Father     Diabetes Father         type 2    No Known Problems Sister     No Known Problems Brother     Breast Cancer Sister     Stroke Maternal Grandmother     No Known Problems Daughter     No Known Problems Son       Social History     Tobacco Use    Smoking status: Never Smoker    Smokeless tobacco: Never Used   Substance Use Topics    Alcohol use: No     Alcohol/week: 0.0 standard drinks       Prior to Admission medications    Medication Sig Start Date End Date Taking? Authorizing Provider   aspirin delayed-release 81 mg tablet Take 81 mg by mouth daily. Provider, Historical   acetaminophen (TYLENOL) 325 mg tablet Take  by mouth every four (4) hours as needed for Pain. Provider, Historical   metFORMIN ER (GLUCOPHAGE XR) 500 mg tablet Take 1 Tab by mouth Before breakfast and dinner. Indications: type 2 diabetes mellitus  Patient taking differently: Take 500 mg by mouth Before breakfast and dinner. Non-formulary medication. Patient instructed to bring medication to the hospital on the DOS, in the original bottle, and give to nurse. Indications: type 2 diabetes mellitus 1/8/20   [place] GLYNN PA-C   ergocalciferol (ERGOCALCIFEROL) 50,000 unit capsule Take 1 Cap by mouth every seven (7) days. Patient taking differently: Take 50,000 Units by mouth every seven (7) days. Every Saturday 12/19/19   [place] GLYNN PA-C   dilTIAZem CD (CARDIZEM CD) 240 mg ER capsule Take 1 Cap by mouth daily. 12/4/19   Lauren Casillas NP   rosuvastatin (CRESTOR) 10 mg tablet Take 1 Tab by mouth nightly. 12/4/19   Lauren Casillas NP   lisinopril-hydroCHLOROthiazide (PRINZIDE, ZESTORETIC) 20-12.5 mg per tablet Take 1 Tab by mouth daily.  Indications: high blood pressure 12/4/19   Coleen GLYNN NP   insulin NPH (NOVOLIN N NPH U-100 INSULIN) 100 unit/mL injection 30 Units by SubCUTAneous route Before breakfast and dinner. Patient taking differently: 35 Units by SubCUTAneous route Before breakfast and dinner. Patient instructed to take 80% or 28 units of NPH insulin the day before surgery (both AM and PM dose). Patient instructed to take 1/2 or 17 units of NPH insulin on the day of surgery if glucose is greater than 120, if glucose is less than 120 then HOLD AM dose of NPH insulin. 6/18/19   Heather Shelton PA-C   insulin regular (NOVOLIN R REGULAR U-100 INSULN) 100 unit/mL injection 18 units before breakfast, 20 units before lunch and supper plus correction 1/20>150, max daily dose 60 units  Patient taking differently: 20 units before breakfast, 20 units before lunch and supper plus correction 1/20>150, max daily dose 60 units 6/18/19   Carina Balderas PA-C   glucose blood VI test strips (ONETOUCH VERIO) strip Check glucose 4 times daily, E11.65 6/18/19   Heather Shelton PA-C   lancets (ONE TOUCH DELICA) 33 gauge misc Check glucose 4 times daily, E11.65 6/18/19   Rosario Balderas PA-C   Insulin Syringe-Needle U-100 0.5 mL 31 gauge x 5/16\" syrg Use with insulin up to 5 times daily, E11.65 1/31/19   Anna LGYNN PA-C   melatonin tab tablet Take 5 mg by mouth nightly. Provider, Historical     Allergies   Allergen Reactions    Ambien [Zolpidem] Palpitations     Ambien CR heart race. ambien-hypnotics hallucinations    Erythromycin Other (comments)     Increased heart rate     Norvasc [Amlodipine] Other (comments)     Leg swelling        REVIEW OF SYSTEMS:  CONSTITUTIONAL: Denies fever, decreased appetite, weight loss/gain, night sweats or fatigue. HEENT: Denies vision or hearing changes. denies glasses. denies hearing aids. CARDIAC: Denies CP, palpitations, rheumatic fever, murmur, peripheral edema, carotid artery disease or syncopal episodes. RESPIRATORY: Denies dyspnea on exertion, asthma, COPD or orthopnea.  GI: Denies GERD, history of GI bleed or melena, PUD, hepatitis or cirrhosis. : Denies dysuria, hematuria. denies BPH symptoms. HEMATOLOGIC: Denies anemia or blood disorders. ENDOCRINE: Denies thyroid disease. MUSCULOSKELETAL: See HPI. NEUROLOGIC: Denies seizure, peripheral neuropathy or memory loss. PSYCH: Denies depression, anxiety or insomnia. SKIN: Denies rash or open sores. Objective:    PHYSICAL EXAM  GENERAL: No data found. EYES: PERRL. EOM intact. MOUTH:Teeth and Gums normal. NECK: Full ROM. Trachea midline. No thyromegaly or JVD. CARDIOVASCULAR: Regular rate and rhythm. No murmur or gallops. No carotid bruits. Peripheral pulses: radial 2 +, PT 2+, DP 2+ bilaterally. LUNGS: CTA bilaterally. No wheezes, rhonchi or rales. GI: positive BS. Abdomen nontender. NEUROLOGIC: Alert and oriented x 3. Bilateral equal strong had grasp and bilateral equal strong plantar flexion and dorsiflexion. GAIT: abnormal MUSCULOSKELETAL: ROM: full with pain. Tenderness: over the medial and lateral joint lines. Crepitus: present. SKIN: No rash, bruising, swelling, redness or warmth. Labs:  No results found for this or any previous visit (from the past 24 hour(s)). Xray Right Knee: joint space narrowing    Assessment:  Advanced Right Knee Osteoarthritis. Total Right Knee Arthroplasty Indicated.   Patient Active Problem List   Diagnosis Code    Hand eczema L30.9    DJD (degenerative joint disease) M19.90    Essential hypertension, benign I10    Mixed hyperlipidemia E78.2    Uncontrolled type 2 diabetes mellitus without complication, with long-term current use of insulin ([address] E11.65, Z79.4    ISRA[place] on CPAP G47.33, Z99.89    Morbid obesity ([address] E66.01    Generalized osteoarthrosis, involving multiple sites M15.9    Breast cancer ([address] C50.919    Osteoarthritis of left knee M17.12    Elevated serum creatinine R79.89    Abnormal urinalysis R82.90    Type 2 diabetes with nephropathy (HCC) E11.21    Arthritis of knee, left M17.12    S/P total knee arthroplasty Z96.659    Total knee replacement status, left Z96.652    Hypercalcemia E83.52       Plan:  I have advised the patient of the risks and consequences, including possible complications of performing total joint replacement, as well as not doing this operation. The patient had the opportunity to ask questions and have them answered to their satisfaction.      Signed:  ZURI Sherman 1/31/2020

## 2020-02-05 ENCOUNTER — ANESTHESIA EVENT (OUTPATIENT)
Dept: SURGERY | Age: 70
DRG: 470 | End: 2020-02-05
Payer: MEDICARE

## 2020-02-06 ENCOUNTER — HOSPITAL ENCOUNTER (INPATIENT)
Age: 70
LOS: 1 days | Discharge: HOME HEALTH | DRG: 470 | End: 2020-02-07
Attending: ORTHOPAEDIC SURGERY | Admitting: ORTHOPAEDIC SURGERY
Payer: MEDICARE

## 2020-02-06 ENCOUNTER — ANESTHESIA (OUTPATIENT)
Dept: SURGERY | Age: 70
DRG: 470 | End: 2020-02-06
Payer: MEDICARE

## 2020-02-06 DIAGNOSIS — Z96.651 S/P TKR (TOTAL KNEE REPLACEMENT) USING CEMENT, RIGHT: Primary | ICD-10-CM

## 2020-02-06 PROBLEM — M17.11 ARTHRITIS OF KNEE, RIGHT: Status: ACTIVE | Noted: 2020-02-06

## 2020-02-06 LAB
GLUCOSE BLD STRIP.AUTO-MCNC: 125 MG/DL (ref 65–100)
GLUCOSE BLD STRIP.AUTO-MCNC: 192 MG/DL (ref 65–100)
GLUCOSE BLD STRIP.AUTO-MCNC: 397 MG/DL (ref 65–100)
HCG UR QL: NEGATIVE
HGB BLD-MCNC: 10 G/DL (ref 11.7–15.4)

## 2020-02-06 PROCEDURE — 74011000250 HC RX REV CODE- 250: Performed by: NURSE ANESTHETIST, CERTIFIED REGISTERED

## 2020-02-06 PROCEDURE — 74011250637 HC RX REV CODE- 250/637: Performed by: ANESTHESIOLOGY

## 2020-02-06 PROCEDURE — 74011250636 HC RX REV CODE- 250/636: Performed by: ANESTHESIOLOGY

## 2020-02-06 PROCEDURE — 76010000171 HC OR TIME 2 TO 2.5 HR INTENSV-TIER 1: Performed by: ORTHOPAEDIC SURGERY

## 2020-02-06 PROCEDURE — 74011250636 HC RX REV CODE- 250/636: Performed by: ORTHOPAEDIC SURGERY

## 2020-02-06 PROCEDURE — 77030006720 HC BLD PAT RMR ZIMM -B: Performed by: ORTHOPAEDIC SURGERY

## 2020-02-06 PROCEDURE — 82962 GLUCOSE BLOOD TEST: CPT

## 2020-02-06 PROCEDURE — 77030040922 HC BLNKT HYPOTHRM STRY -A: Performed by: ANESTHESIOLOGY

## 2020-02-06 PROCEDURE — C1713 ANCHOR/SCREW BN/BN,TIS/BN: HCPCS | Performed by: ORTHOPAEDIC SURGERY

## 2020-02-06 PROCEDURE — 77030018836 HC SOL IRR NACL ICUM -A: Performed by: ORTHOPAEDIC SURGERY

## 2020-02-06 PROCEDURE — 77030033067 HC SUT PDO STRATFX SPIR J&J -B: Performed by: ORTHOPAEDIC SURGERY

## 2020-02-06 PROCEDURE — 74011000258 HC RX REV CODE- 258: Performed by: ORTHOPAEDIC SURGERY

## 2020-02-06 PROCEDURE — 74011250636 HC RX REV CODE- 250/636: Performed by: NURSE ANESTHETIST, CERTIFIED REGISTERED

## 2020-02-06 PROCEDURE — C1776 JOINT DEVICE (IMPLANTABLE): HCPCS | Performed by: ORTHOPAEDIC SURGERY

## 2020-02-06 PROCEDURE — 0SRC0J9 REPLACEMENT OF RIGHT KNEE JOINT WITH SYNTHETIC SUBSTITUTE, CEMENTED, OPEN APPROACH: ICD-10-PCS | Performed by: ORTHOPAEDIC SURGERY

## 2020-02-06 PROCEDURE — 77030007880 HC KT SPN EPDRL BBMI -B: Performed by: ANESTHESIOLOGY

## 2020-02-06 PROCEDURE — 77030018846 HC SOL IRR STRL H20 ICUM -A

## 2020-02-06 PROCEDURE — 77030012935 HC DRSG AQUACEL BMS -B: Performed by: ORTHOPAEDIC SURGERY

## 2020-02-06 PROCEDURE — 77030031139 HC SUT VCRL2 J&J -A: Performed by: ORTHOPAEDIC SURGERY

## 2020-02-06 PROCEDURE — 77030013708 HC HNDPC SUC IRR PULS STRY –B: Performed by: ORTHOPAEDIC SURGERY

## 2020-02-06 PROCEDURE — 74011636637 HC RX REV CODE- 636/637: Performed by: HOSPITALIST

## 2020-02-06 PROCEDURE — 77030019557 HC ELECTRD VES SEAL MEDT -F: Performed by: ORTHOPAEDIC SURGERY

## 2020-02-06 PROCEDURE — 36415 COLL VENOUS BLD VENIPUNCTURE: CPT

## 2020-02-06 PROCEDURE — 76060000035 HC ANESTHESIA 2 TO 2.5 HR: Performed by: ORTHOPAEDIC SURGERY

## 2020-02-06 PROCEDURE — 77030037714 HC CLOSR DEV INCIS ZIP STRY -C: Performed by: ORTHOPAEDIC SURGERY

## 2020-02-06 PROCEDURE — 65270000029 HC RM PRIVATE

## 2020-02-06 PROCEDURE — 74011000250 HC RX REV CODE- 250: Performed by: ORTHOPAEDIC SURGERY

## 2020-02-06 PROCEDURE — 77030016544 HC BLD SAW RECIP1 STRY -B: Performed by: ORTHOPAEDIC SURGERY

## 2020-02-06 PROCEDURE — 76942 ECHO GUIDE FOR BIOPSY: CPT | Performed by: ORTHOPAEDIC SURGERY

## 2020-02-06 PROCEDURE — 77030003602 HC NDL NRV BLK BBMI -B: Performed by: ANESTHESIOLOGY

## 2020-02-06 PROCEDURE — 74011636637 HC RX REV CODE- 636/637: Performed by: ANESTHESIOLOGY

## 2020-02-06 PROCEDURE — 74011000250 HC RX REV CODE- 250: Performed by: ANESTHESIOLOGY

## 2020-02-06 PROCEDURE — 77030013819 HC MX SYS CEM ZIMM -B: Performed by: ORTHOPAEDIC SURGERY

## 2020-02-06 PROCEDURE — 77030003665 HC NDL SPN BBMI -A: Performed by: ANESTHESIOLOGY

## 2020-02-06 PROCEDURE — 76010010054 HC POST OP PAIN BLOCK: Performed by: ORTHOPAEDIC SURGERY

## 2020-02-06 PROCEDURE — 85018 HEMOGLOBIN: CPT

## 2020-02-06 PROCEDURE — 74011250637 HC RX REV CODE- 250/637: Performed by: ORTHOPAEDIC SURGERY

## 2020-02-06 PROCEDURE — 81025 URINE PREGNANCY TEST: CPT

## 2020-02-06 PROCEDURE — 76210000000 HC OR PH I REC 2 TO 2.5 HR: Performed by: ORTHOPAEDIC SURGERY

## 2020-02-06 PROCEDURE — 77030006835 HC BLD SAW SAG STRY -B: Performed by: ORTHOPAEDIC SURGERY

## 2020-02-06 PROCEDURE — 94760 N-INVAS EAR/PLS OXIMETRY 1: CPT

## 2020-02-06 DEVICE — BASEPLATE TIB SZ 5 FIX BEAR CO CHROM MOLYBDENUM TI ALLY END: Type: IMPLANTABLE DEVICE | Site: KNEE | Status: FUNCTIONAL

## 2020-02-06 DEVICE — CEMENT BNE 20ML 41GM FULL DOSE PMMA W/ TOBRA M VISC RADPQ: Type: IMPLANTABLE DEVICE | Site: KNEE | Status: FUNCTIONAL

## 2020-02-06 DEVICE — COMPONENT FEM SZ 6 R KNEE POST STBL CEM ATTUNE: Type: IMPLANTABLE DEVICE | Site: KNEE | Status: FUNCTIONAL

## 2020-02-06 DEVICE — STEM FEM L50MM DIA14MM KNEE CEM REV ATTUNE: Type: IMPLANTABLE DEVICE | Site: KNEE | Status: FUNCTIONAL

## 2020-02-06 DEVICE — IMPLANTABLE DEVICE: Type: IMPLANTABLE DEVICE | Site: KNEE | Status: FUNCTIONAL

## 2020-02-06 RX ORDER — OXYCODONE HYDROCHLORIDE 5 MG/1
5 TABLET ORAL
Status: DISCONTINUED | OUTPATIENT
Start: 2020-02-06 | End: 2020-02-06 | Stop reason: HOSPADM

## 2020-02-06 RX ORDER — SODIUM CHLORIDE 0.9 % (FLUSH) 0.9 %
5-40 SYRINGE (ML) INJECTION EVERY 8 HOURS
Status: DISCONTINUED | OUTPATIENT
Start: 2020-02-06 | End: 2020-02-07 | Stop reason: HOSPADM

## 2020-02-06 RX ORDER — ASPIRIN 81 MG/1
81 TABLET ORAL EVERY 12 HOURS
Status: DISCONTINUED | OUTPATIENT
Start: 2020-02-06 | End: 2020-02-07 | Stop reason: HOSPADM

## 2020-02-06 RX ORDER — FENTANYL CITRATE 50 UG/ML
100 INJECTION, SOLUTION INTRAMUSCULAR; INTRAVENOUS ONCE
Status: COMPLETED | OUTPATIENT
Start: 2020-02-06 | End: 2020-02-06

## 2020-02-06 RX ORDER — ROSUVASTATIN CALCIUM 5 MG/1
10 TABLET, COATED ORAL
Status: DISCONTINUED | OUTPATIENT
Start: 2020-02-06 | End: 2020-02-07 | Stop reason: HOSPADM

## 2020-02-06 RX ORDER — INSULIN LISPRO 100 [IU]/ML
5 INJECTION, SOLUTION INTRAVENOUS; SUBCUTANEOUS ONCE
Status: COMPLETED | OUTPATIENT
Start: 2020-02-06 | End: 2020-02-06

## 2020-02-06 RX ORDER — ACETAMINOPHEN 10 MG/ML
1000 INJECTION, SOLUTION INTRAVENOUS ONCE
Status: COMPLETED | OUTPATIENT
Start: 2020-02-06 | End: 2020-02-06

## 2020-02-06 RX ORDER — DEXAMETHASONE SODIUM PHOSPHATE 4 MG/ML
INJECTION, SOLUTION INTRA-ARTICULAR; INTRALESIONAL; INTRAMUSCULAR; INTRAVENOUS; SOFT TISSUE AS NEEDED
Status: DISCONTINUED | OUTPATIENT
Start: 2020-02-06 | End: 2020-02-06 | Stop reason: HOSPADM

## 2020-02-06 RX ORDER — CEFAZOLIN SODIUM/WATER 2 G/20 ML
2 SYRINGE (ML) INTRAVENOUS ONCE
Status: COMPLETED | OUTPATIENT
Start: 2020-02-06 | End: 2020-02-06

## 2020-02-06 RX ORDER — MIDAZOLAM HYDROCHLORIDE 1 MG/ML
2 INJECTION, SOLUTION INTRAMUSCULAR; INTRAVENOUS ONCE
Status: DISCONTINUED | OUTPATIENT
Start: 2020-02-06 | End: 2020-02-06 | Stop reason: HOSPADM

## 2020-02-06 RX ORDER — MIDAZOLAM HYDROCHLORIDE 1 MG/ML
INJECTION, SOLUTION INTRAMUSCULAR; INTRAVENOUS AS NEEDED
Status: DISCONTINUED | OUTPATIENT
Start: 2020-02-06 | End: 2020-02-06 | Stop reason: HOSPADM

## 2020-02-06 RX ORDER — CEFAZOLIN SODIUM/WATER 2 G/20 ML
2 SYRINGE (ML) INTRAVENOUS EVERY 8 HOURS
Status: COMPLETED | OUTPATIENT
Start: 2020-02-06 | End: 2020-02-07

## 2020-02-06 RX ORDER — ONDANSETRON 8 MG/1
8 TABLET, ORALLY DISINTEGRATING ORAL
Status: DISCONTINUED | OUTPATIENT
Start: 2020-02-06 | End: 2020-02-07 | Stop reason: HOSPADM

## 2020-02-06 RX ORDER — INSULIN LISPRO 100 [IU]/ML
4 INJECTION, SOLUTION INTRAVENOUS; SUBCUTANEOUS ONCE
Status: COMPLETED | OUTPATIENT
Start: 2020-02-06 | End: 2020-02-06

## 2020-02-06 RX ORDER — SODIUM CHLORIDE, SODIUM LACTATE, POTASSIUM CHLORIDE, CALCIUM CHLORIDE 600; 310; 30; 20 MG/100ML; MG/100ML; MG/100ML; MG/100ML
50 INJECTION, SOLUTION INTRAVENOUS CONTINUOUS
Status: DISCONTINUED | OUTPATIENT
Start: 2020-02-06 | End: 2020-02-06 | Stop reason: HOSPADM

## 2020-02-06 RX ORDER — BUPIVACAINE HYDROCHLORIDE 7.5 MG/ML
INJECTION, SOLUTION INTRASPINAL
Status: COMPLETED | OUTPATIENT
Start: 2020-02-06 | End: 2020-02-06

## 2020-02-06 RX ORDER — CELECOXIB 200 MG/1
200 CAPSULE ORAL
Status: COMPLETED | OUTPATIENT
Start: 2020-02-06 | End: 2020-02-06

## 2020-02-06 RX ORDER — PROPOFOL 10 MG/ML
INJECTION, EMULSION INTRAVENOUS
Status: DISCONTINUED | OUTPATIENT
Start: 2020-02-06 | End: 2020-02-06 | Stop reason: HOSPADM

## 2020-02-06 RX ORDER — DEXAMETHASONE SODIUM PHOSPHATE 100 MG/10ML
10 INJECTION INTRAMUSCULAR; INTRAVENOUS ONCE
Status: DISCONTINUED | OUTPATIENT
Start: 2020-02-07 | End: 2020-02-07 | Stop reason: HOSPADM

## 2020-02-06 RX ORDER — ROPIVACAINE HYDROCHLORIDE 2 MG/ML
INJECTION, SOLUTION EPIDURAL; INFILTRATION; PERINEURAL AS NEEDED
Status: DISCONTINUED | OUTPATIENT
Start: 2020-02-06 | End: 2020-02-06 | Stop reason: HOSPADM

## 2020-02-06 RX ORDER — CELECOXIB 200 MG/1
200 CAPSULE ORAL EVERY 12 HOURS
Status: DISCONTINUED | OUTPATIENT
Start: 2020-02-06 | End: 2020-02-07 | Stop reason: HOSPADM

## 2020-02-06 RX ORDER — TRANEXAMIC ACID 100 MG/ML
INJECTION, SOLUTION INTRAVENOUS AS NEEDED
Status: DISCONTINUED | OUTPATIENT
Start: 2020-02-06 | End: 2020-02-06 | Stop reason: HOSPADM

## 2020-02-06 RX ORDER — SODIUM CHLORIDE 0.9 % (FLUSH) 0.9 %
5-40 SYRINGE (ML) INJECTION AS NEEDED
Status: DISCONTINUED | OUTPATIENT
Start: 2020-02-06 | End: 2020-02-07 | Stop reason: HOSPADM

## 2020-02-06 RX ORDER — EPHEDRINE SULFATE/0.9% NACL/PF 50 MG/5 ML
SYRINGE (ML) INTRAVENOUS AS NEEDED
Status: DISCONTINUED | OUTPATIENT
Start: 2020-02-06 | End: 2020-02-06 | Stop reason: HOSPADM

## 2020-02-06 RX ORDER — AMOXICILLIN 250 MG
2 CAPSULE ORAL DAILY
Status: DISCONTINUED | OUTPATIENT
Start: 2020-02-07 | End: 2020-02-07 | Stop reason: HOSPADM

## 2020-02-06 RX ORDER — LISINOPRIL AND HYDROCHLOROTHIAZIDE 12.5; 2 MG/1; MG/1
1 TABLET ORAL DAILY
Status: DISCONTINUED | OUTPATIENT
Start: 2020-02-07 | End: 2020-02-07 | Stop reason: HOSPADM

## 2020-02-06 RX ORDER — NALOXONE HYDROCHLORIDE 0.4 MG/ML
.2-.4 INJECTION, SOLUTION INTRAMUSCULAR; INTRAVENOUS; SUBCUTANEOUS
Status: DISCONTINUED | OUTPATIENT
Start: 2020-02-06 | End: 2020-02-07 | Stop reason: HOSPADM

## 2020-02-06 RX ORDER — VANCOMYCIN 1.75 GRAM/500 ML IN 0.9 % SODIUM CHLORIDE INTRAVENOUS
1750
Status: COMPLETED | OUTPATIENT
Start: 2020-02-06 | End: 2020-02-06

## 2020-02-06 RX ORDER — INSULIN LISPRO 100 [IU]/ML
INJECTION, SOLUTION INTRAVENOUS; SUBCUTANEOUS
Status: DISCONTINUED | OUTPATIENT
Start: 2020-02-06 | End: 2020-02-07 | Stop reason: HOSPADM

## 2020-02-06 RX ORDER — SODIUM CHLORIDE 9 MG/ML
100 INJECTION, SOLUTION INTRAVENOUS CONTINUOUS
Status: DISCONTINUED | OUTPATIENT
Start: 2020-02-06 | End: 2020-02-07 | Stop reason: HOSPADM

## 2020-02-06 RX ORDER — METFORMIN HYDROCHLORIDE 500 MG/1
1000 TABLET, FILM COATED, EXTENDED RELEASE ORAL 2 TIMES DAILY
Status: DISCONTINUED | OUTPATIENT
Start: 2020-02-06 | End: 2020-02-06

## 2020-02-06 RX ORDER — DIPHENHYDRAMINE HCL 25 MG
25 CAPSULE ORAL
Status: DISCONTINUED | OUTPATIENT
Start: 2020-02-06 | End: 2020-02-07 | Stop reason: HOSPADM

## 2020-02-06 RX ORDER — DILTIAZEM HYDROCHLORIDE 240 MG/1
240 CAPSULE, COATED, EXTENDED RELEASE ORAL DAILY
Status: DISCONTINUED | OUTPATIENT
Start: 2020-02-07 | End: 2020-02-07 | Stop reason: HOSPADM

## 2020-02-06 RX ORDER — HYDROMORPHONE HYDROCHLORIDE 1 MG/ML
1 INJECTION, SOLUTION INTRAMUSCULAR; INTRAVENOUS; SUBCUTANEOUS
Status: DISCONTINUED | OUTPATIENT
Start: 2020-02-06 | End: 2020-02-07 | Stop reason: HOSPADM

## 2020-02-06 RX ORDER — ALBUTEROL SULFATE 0.83 MG/ML
2.5 SOLUTION RESPIRATORY (INHALATION) AS NEEDED
Status: DISCONTINUED | OUTPATIENT
Start: 2020-02-06 | End: 2020-02-06 | Stop reason: HOSPADM

## 2020-02-06 RX ORDER — SODIUM CHLORIDE, SODIUM LACTATE, POTASSIUM CHLORIDE, CALCIUM CHLORIDE 600; 310; 30; 20 MG/100ML; MG/100ML; MG/100ML; MG/100ML
75 INJECTION, SOLUTION INTRAVENOUS CONTINUOUS
Status: DISCONTINUED | OUTPATIENT
Start: 2020-02-06 | End: 2020-02-06 | Stop reason: HOSPADM

## 2020-02-06 RX ORDER — LIDOCAINE HYDROCHLORIDE 10 MG/ML
0.1 INJECTION INFILTRATION; PERINEURAL AS NEEDED
Status: DISCONTINUED | OUTPATIENT
Start: 2020-02-06 | End: 2020-02-06 | Stop reason: HOSPADM

## 2020-02-06 RX ORDER — HYDROMORPHONE HYDROCHLORIDE 2 MG/ML
0.5 INJECTION, SOLUTION INTRAMUSCULAR; INTRAVENOUS; SUBCUTANEOUS
Status: DISCONTINUED | OUTPATIENT
Start: 2020-02-06 | End: 2020-02-06 | Stop reason: HOSPADM

## 2020-02-06 RX ORDER — METFORMIN HYDROCHLORIDE 500 MG/1
500 TABLET, FILM COATED, EXTENDED RELEASE ORAL 2 TIMES DAILY
Status: DISCONTINUED | OUTPATIENT
Start: 2020-02-07 | End: 2020-02-06 | Stop reason: SDUPTHER

## 2020-02-06 RX ORDER — METFORMIN HYDROCHLORIDE 500 MG/1
1000 TABLET ORAL 2 TIMES DAILY
Status: DISCONTINUED | OUTPATIENT
Start: 2020-02-06 | End: 2020-02-06 | Stop reason: ALTCHOICE

## 2020-02-06 RX ORDER — OXYCODONE HYDROCHLORIDE 5 MG/1
5-10 TABLET ORAL
Status: DISCONTINUED | OUTPATIENT
Start: 2020-02-06 | End: 2020-02-07 | Stop reason: HOSPADM

## 2020-02-06 RX ORDER — ONDANSETRON 2 MG/ML
INJECTION INTRAMUSCULAR; INTRAVENOUS AS NEEDED
Status: DISCONTINUED | OUTPATIENT
Start: 2020-02-06 | End: 2020-02-06 | Stop reason: HOSPADM

## 2020-02-06 RX ORDER — DEXAMETHASONE SODIUM PHOSPHATE 4 MG/ML
INJECTION, SOLUTION INTRA-ARTICULAR; INTRALESIONAL; INTRAMUSCULAR; INTRAVENOUS; SOFT TISSUE
Status: COMPLETED | OUTPATIENT
Start: 2020-02-06 | End: 2020-02-06

## 2020-02-06 RX ORDER — ACETAMINOPHEN 500 MG
1000 TABLET ORAL EVERY 6 HOURS
Status: DISCONTINUED | OUTPATIENT
Start: 2020-02-07 | End: 2020-02-07 | Stop reason: HOSPADM

## 2020-02-06 RX ORDER — METFORMIN HYDROCHLORIDE 500 MG/1
500 TABLET, FILM COATED, EXTENDED RELEASE ORAL 2 TIMES DAILY
Status: DISCONTINUED | OUTPATIENT
Start: 2020-02-06 | End: 2020-02-07 | Stop reason: HOSPADM

## 2020-02-06 RX ORDER — MIDAZOLAM HYDROCHLORIDE 1 MG/ML
2 INJECTION, SOLUTION INTRAMUSCULAR; INTRAVENOUS
Status: COMPLETED | OUTPATIENT
Start: 2020-02-06 | End: 2020-02-06

## 2020-02-06 RX ORDER — PROMETHAZINE HYDROCHLORIDE 25 MG/1
25 TABLET ORAL
Status: DISCONTINUED | OUTPATIENT
Start: 2020-02-06 | End: 2020-02-07 | Stop reason: HOSPADM

## 2020-02-06 RX ADMIN — BUPIVACAINE HYDROCHLORIDE IN DEXTROSE 15 MG: 7.5 INJECTION, SOLUTION SUBARACHNOID at 12:13

## 2020-02-06 RX ADMIN — Medication 10 ML: at 22:37

## 2020-02-06 RX ADMIN — VANCOMYCIN HYDROCHLORIDE 1750 MG: 10 INJECTION, POWDER, LYOPHILIZED, FOR SOLUTION INTRAVENOUS at 12:24

## 2020-02-06 RX ADMIN — ONDANSETRON 4 MG: 2 INJECTION INTRAMUSCULAR; INTRAVENOUS at 12:24

## 2020-02-06 RX ADMIN — MIDAZOLAM 2 MG: 1 INJECTION INTRAMUSCULAR; INTRAVENOUS at 12:09

## 2020-02-06 RX ADMIN — OXYCODONE 10 MG: 5 TABLET ORAL at 20:44

## 2020-02-06 RX ADMIN — DEXAMETHASONE SODIUM PHOSPHATE 4 MG: 4 INJECTION, SOLUTION INTRAMUSCULAR; INTRAVENOUS at 11:17

## 2020-02-06 RX ADMIN — OXYCODONE 10 MG: 5 TABLET ORAL at 17:03

## 2020-02-06 RX ADMIN — Medication 2 G: at 12:01

## 2020-02-06 RX ADMIN — Medication 5 ML: at 17:11

## 2020-02-06 RX ADMIN — PROPOFOL 100 MCG/KG/MIN: 10 INJECTION, EMULSION INTRAVENOUS at 12:19

## 2020-02-06 RX ADMIN — Medication 10 MG: at 12:49

## 2020-02-06 RX ADMIN — Medication 2 G: at 20:43

## 2020-02-06 RX ADMIN — SODIUM CHLORIDE, SODIUM LACTATE, POTASSIUM CHLORIDE, AND CALCIUM CHLORIDE: 600; 310; 30; 20 INJECTION, SOLUTION INTRAVENOUS at 13:14

## 2020-02-06 RX ADMIN — ASPIRIN 81 MG: 81 TABLET ORAL at 20:44

## 2020-02-06 RX ADMIN — CELECOXIB 200 MG: 200 CAPSULE ORAL at 10:34

## 2020-02-06 RX ADMIN — DEXAMETHASONE SODIUM PHOSPHATE 10 MG: 4 INJECTION, SOLUTION INTRAMUSCULAR; INTRAVENOUS at 12:24

## 2020-02-06 RX ADMIN — SODIUM CHLORIDE, SODIUM LACTATE, POTASSIUM CHLORIDE, AND CALCIUM CHLORIDE 75 ML/HR: 600; 310; 30; 20 INJECTION, SOLUTION INTRAVENOUS at 10:07

## 2020-02-06 RX ADMIN — INSULIN LISPRO 5 UNITS: 100 INJECTION, SOLUTION INTRAVENOUS; SUBCUTANEOUS at 22:28

## 2020-02-06 RX ADMIN — MIDAZOLAM 2 MG: 1 INJECTION INTRAMUSCULAR; INTRAVENOUS at 11:15

## 2020-02-06 RX ADMIN — Medication 5 ML: at 20:45

## 2020-02-06 RX ADMIN — Medication 1 AMPULE: at 20:44

## 2020-02-06 RX ADMIN — INSULIN LISPRO 10 UNITS: 100 INJECTION, SOLUTION INTRAVENOUS; SUBCUTANEOUS at 22:29

## 2020-02-06 RX ADMIN — ROSUVASTATIN CALCIUM 10 MG: 5 TABLET, FILM COATED ORAL at 20:44

## 2020-02-06 RX ADMIN — METFORMIN HYDROCHLORIDE 500 MG: 500 TABLET, FILM COATED, EXTENDED RELEASE ORAL at 20:00

## 2020-02-06 RX ADMIN — TRANEXAMIC ACID 1000 MG: 100 INJECTION, SOLUTION INTRAVENOUS at 12:20

## 2020-02-06 RX ADMIN — INSULIN LISPRO 4 UNITS: 100 INJECTION, SOLUTION INTRAVENOUS; SUBCUTANEOUS at 11:00

## 2020-02-06 RX ADMIN — Medication 10 MG: at 12:31

## 2020-02-06 RX ADMIN — Medication 3 AMPULE: at 10:07

## 2020-02-06 RX ADMIN — ROPIVACAINE HYDROCHLORIDE 20 ML: 2 INJECTION, SOLUTION EPIDURAL; INFILTRATION at 11:17

## 2020-02-06 RX ADMIN — INSULIN HUMAN 35 UNITS: 100 INJECTION, SUSPENSION SUBCUTANEOUS at 22:05

## 2020-02-06 RX ADMIN — FENTANYL CITRATE 50 MCG: 50 INJECTION, SOLUTION INTRAMUSCULAR; INTRAVENOUS at 11:15

## 2020-02-06 RX ADMIN — CELECOXIB 200 MG: 200 CAPSULE ORAL at 20:44

## 2020-02-06 RX ADMIN — ACETAMINOPHEN 1000 MG: 10 INJECTION, SOLUTION INTRAVENOUS at 17:05

## 2020-02-06 RX ADMIN — HYDROMORPHONE HYDROCHLORIDE 1 MG: 1 INJECTION, SOLUTION INTRAMUSCULAR; INTRAVENOUS; SUBCUTANEOUS at 22:36

## 2020-02-06 NOTE — ANESTHESIA PROCEDURE NOTES
Spinal Block    Start time: 2/6/2020 12:13 PM  End time: 2/6/2020 12:17 PM  Performed by: Bhavani Mane MD  Authorized by: Bhavani Mane MD     Pre-procedure:   Indications: primary anesthetic  Preanesthetic Checklist: patient identified, risks and benefits discussed, anesthesia consent, patient being monitored and timeout performed    Timeout Time: 12:13          Spinal Block:   Patient Position:  Seated  Prep Region:  Lumbar  Prep: chlorhexidine and patient draped      Location:  L3-4  Technique:  Single shot    Local Dose (mL):  3    Needle:   Needle Type:  Pencan  Needle Gauge:  25 G  Attempts:  1      Events: CSF confirmed, no blood with aspiration and no paresthesia        Assessment:  Insertion:  Uncomplicated  Patient tolerance:  Patient tolerated the procedure well with no immediate complications

## 2020-02-06 NOTE — PERIOP NOTES
Teach back method used with patient concerning antiseptic wash, TB screening, incentive spirometer, and pain management goals. Patient and family were provided with home discharge needs list. IS in the car pending use.

## 2020-02-06 NOTE — PROGRESS NOTES
TRANSFER - IN REPORT:    Verbal report received from Yancy Diaz RN on [facility]  being received from PACU for routine post - op      Report consisted of patients Situation, Background, Assessment and   Recommendations(SBAR). Information from the following report(s) SBAR was reviewed with the receiving nurse. Opportunity for questions and clarification was provided. Assessment completed upon patients arrival to unit and care assumed. Oriented to room, bed controls, and how to order meals. C/o pain, medicated with oxycodone 10 mg. Son in room. Aquacel dry and intact to R knee with iceman. SCDs and yellow gripper socks to LEs and instructed not to get up without staff to assist. moving feet, still has tingling.

## 2020-02-06 NOTE — PERIOP NOTES
TRANSFER - OUT REPORT:    Verbal report given to [facility] on Zondra Cea  being transferred to [place] [id number] for routine post - op       Report consisted of patients Situation, Background, Assessment and   Recommendations(SBAR). Information from the following report(s) SBAR, Kardex, Procedure Summary, Intake/Output and MAR was reviewed with the receiving nurse. Lines:   Peripheral IV 02/06/20 Right Hand (Active)   Site Assessment Clean, dry, & intact 2/6/2020  2:45 PM   Phlebitis Assessment 0 2/6/2020  2:45 PM   Infiltration Assessment 0 2/6/2020  2:45 PM   Dressing Status Clean, dry, & intact 2/6/2020  2:45 PM   Dressing Type Tape;Transparent 2/6/2020  2:45 PM   Hub Color/Line Status Pink;Patent; Infusing 2/6/2020  2:45 PM        Opportunity for questions and clarification was provided.       Patient transported with:   Room air, patient chart; IV fluids

## 2020-02-06 NOTE — ANESTHESIA PROCEDURE NOTES
Peripheral Block    Start time: 2/6/2020 11:14 AM  End time: 2/6/2020 11:17 AM  Performed by: Winthrop Harada, MD  Authorized by: Winthrop Harada, MD       Pre-procedure: Indications: at surgeon's request and post-op pain management    Preanesthetic Checklist: patient identified, risks and benefits discussed, site marked, timeout performed, anesthesia consent given and patient being monitored    Timeout Time: 11:14          Block Type:   Block Type:   Adductor canal  Laterality:  Right  Monitoring:  Responsive to questions, continuous pulse ox, oxygen, frequent vital sign checks and heart rate  Injection Technique:  Single shot  Procedures: ultrasound guided    Patient Position: supine  Prep: chlorhexidine    Location:  Upper thigh  Needle Type:  Stimuplex  Needle Gauge:  21 G  Needle Localization:  Ultrasound guidance    Assessment:  Number of attempts:  1  Injection Assessment:  Incremental injection every 5 mL, negative aspiration for CSF, no paresthesia, ultrasound image on chart, no intravascular symptoms, negative aspiration for blood and local visualized surrounding nerve on ultrasound  Patient tolerance:  Patient tolerated the procedure well with no immediate complications

## 2020-02-06 NOTE — ANESTHESIA PREPROCEDURE EVALUATION
Relevant Problems   No relevant active problems       Anesthetic History     Increased risk of difficult airway (Per patient.  No records available.)          Review of Systems / Medical History  Patient summary reviewed and pertinent labs reviewed    Pulmonary        Sleep apnea: CPAP           Neuro/Psych   Within defined limits           Cardiovascular    Hypertension: well controlled          Hyperlipidemia    Exercise tolerance: <4 METS: Limited by knee pain  Comments: Denies recent CP, SOB or Palpitations    TTE 2018- normal EF, no signif valve abnl   GI/Hepatic/Renal  Within defined limits              Endo/Other    Diabetes (Hgb A1c 8.2): well controlled, type 2    Morbid obesity and arthritis     Other Findings   Comments: DJD           Physical Exam    Airway  Mallampati: IV  TM Distance: < 4 cm  Neck ROM: normal range of motion   Mouth opening: Normal     Cardiovascular  Regular rate and rhythm,  S1 and S2 normal,  no murmur, click, rub, or gallop             Dental  No notable dental hx       Pulmonary  Breath sounds clear to auscultation               Abdominal  GI exam deferred       Other Findings            Anesthetic Plan    ASA: 3  Anesthesia type: spinal      Post-op pain plan if not by surgeon: peripheral nerve block single      Anesthetic plan and risks discussed with: Patient

## 2020-02-06 NOTE — ANESTHESIA POSTPROCEDURE EVALUATION
Procedure(s):  KNEE ARTHROPLASTY TOTAL/ RIGHT/.    spinal, regional    Anesthesia Post Evaluation      Multimodal analgesia: multimodal analgesia used between 6 hours prior to anesthesia start to PACU discharge  Patient location during evaluation: PACU  Patient participation: complete - patient participated  Level of consciousness: responsive to verbal stimuli  Pain management: adequate  Airway patency: patent  Anesthetic complications: no  Cardiovascular status: acceptable  Respiratory status: acceptable, spontaneous ventilation and nonlabored ventilation  Hydration status: acceptable  Post anesthesia nausea and vomiting:  none      Vitals Value Taken Time   /56 2/6/2020  2:30 PM   Temp 36.7 °C (98 °F) 2/6/2020  2:14 PM   Pulse 72 2/6/2020  2:30 PM   Resp 16 2/6/2020  2:30 PM   SpO2 100 % 2/6/2020  2:30 PM

## 2020-02-06 NOTE — OP NOTES
[facility]  Cemented Total Knee Arthroplasty  Patient:Megha Santillan   : 1950  Medical Record Number:[id number]  Pre-operative Diagnosis:  Unilateral primary osteoarthritis, right knee [M17.11]  Post-operative Diagnosis: Unilateral primary osteoarthritis, right knee     Surgeon: Nova Christopher MD  Assistant: Samanta Andersen PA-C    Anesthesia: Spinal    Procedure: Cemented Total Knee Arthroplasty   The complexity of the total joint surgery requires the use of a first assistant for positioning, retraction and assistance in closure. The patient's Body mass index is 41.88 kg/m²., BMI's greater then 40 make surgical exposure and retraction extremely difficult and increase operative time. In this particular case, exposure time was increased, and a tibial stem was required adding an additional 15 minutes to the case. Tourniquet Time: none  EBL: 150cc  Additional Findings: Severe DJD/ Pre-op ROM 20-75/ post-op 0-125  Releases lateral retincular release for exposure/ medial tibial reduction osteotomy     Juani Berry was brought to the operating room and positioned on the operating table. She was anethestized  IV antibiotics were administered per CMS protocol. Prior to the incision being made a timeout was called identifying the patient, procedure ,operative side and surgeon. The right leg was prepped and draped in the usual sterile manner  An anterior longitudinal incision was accomplished just medial to the tibial tubercle and extending approximal 6 centimeters proximal to the superior pole of the patella. A medial parapatellar capsular incision was performed. The medial capsular flap was elevated around to the insertion of the semimembranous tendon. The patella was everted and the knee flexed and externally rotated. The medial and external menisci were excised. The lateral half of the fat pad excised and the patella femoral ligament was released.   The anterior cruciate ligament was resected and the posterior cruciate ligament was substituted. Using extramedullary instrumentation, the tibial cut was accomplished with appropriate posterior slope. Approxiamately 2 mm of bone was removed from the low side of the tibia. The distal femur was next addressed. A drill hole was made above the intracondylar notch. Using appropriate intramedullary instrumentation,a 5 degree valgus distal cut was accomplished. A femur was sized. The anterior and posterior cuts were then made about the distal femur. The osteophytes were removed from the tibial and femoral surfaces. The flexion and extension gaps were assessed with the appropriate spacer blocks. Additional surgical procedures included none. The flexion and extension gaps were deemed appropriately balanced. The appropriate cutting blocks were then utilized to perform the anterior chamfer, posterior chamfer and notch cuts, with appropriate lateral tranlation accomplished for the patellofemoral groove. The tibia was sized. The tibial base plate was pinned into place with the appropriate external rotation and stem site prepared. A preliminary range of motion was accomplished with the above size trial components. A polyethylene insert allowed the patient to obtain full extension as well as appropriate flexion. The patient's ligaments were stable in flexion and extension to medial and lateral stressing and the alignment was through the appropriate mechanical axis. The patella was then everted. It was prepared for a pegged patellar implant per routine. All trial components were removed and the implants were cemented into position. Bone and cement debris were removed. The betadine lavage protocol was performed. [facility] knee was placed through range of motion and noted to be stable as mentioned above with the trial components. The wound was dry, therefore no drain was used.  The operative knee was injected with 60cc of Naropin, 10 cc's of morphine and 1 cc of 30mg of Toradol. The capsular layer was closed using a #1 vicryl suture, while subcutaneous layers were closed using 2-0 Vicryl interrupted sutures and a #1 Stratofix. Finally the skin was closed using 3-0 Vicryl and a Zipline closure. A sterile sterile bandage was applied. An Iceman cryo pad was applied on the operative leg. Sponge count and needle counts were correct. Stanley Kathryn left the operating room     Implants:   Implant Name Type Inv.  Item Serial No.  Lot No. LRB No. Used   CEMENT BONE SIMPLEX P 1/PACK - [id number]  CEMENT BONE SIMPLEX P 1/PACK  [employer] [facility] [id number] Right 2   FEM PS SZ 6 RT PATTIE -- ATTUNE - [id number]  FEM PS SZ 6 RT PATTIE -- ATTUNE  [facility] [employer] [id number] Right 1   TIB CRS FB BASE SZ 5 PATTIE -- ATTUNE - [id number]  TIB CRS FB BASE SZ 5 PATTIE -- ATTUNE  [employer] [id number] Right 1   STEM PATTIE [id number] -- ATTUNE - [id number]  STEM PATTIE [id number] -- ATTUNE  J [employer] [id number] Right 1   patella medialized     [id number] Right 1   INSERT TIB FB PS SZ 6 6MM -- ATTUNE - [id number]  INSERT TIB FB PS SZ 6 6MM -- ATTUNE  [employer] [id number] Right 1     Signed By: Farhana Kelley MD

## 2020-02-06 NOTE — PROGRESS NOTES
02/06/20 1656   Oxygen Therapy   O2 Sat (%) 95 %   Pulse via Oximetry 79 beats per minute   O2 Device Room air   Incentive Spirometry Treatment   Actual Volume (ml) 2000 ml   Patient encouraged to do IS every hour while awake-patient agreed and demonstrated. No shortness of breath or distress noted. Patient's home CPAP set up at bedside.

## 2020-02-07 ENCOUNTER — HOME HEALTH ADMISSION (OUTPATIENT)
Dept: HOME HEALTH SERVICES | Facility: HOME HEALTH | Age: 70
End: 2020-02-07
Payer: MEDICARE

## 2020-02-07 VITALS
DIASTOLIC BLOOD PRESSURE: 65 MMHG | WEIGHT: 244 LBS | TEMPERATURE: 98.1 F | RESPIRATION RATE: 20 BRPM | HEART RATE: 79 BPM | SYSTOLIC BLOOD PRESSURE: 120 MMHG | HEIGHT: 64 IN | BODY MASS INDEX: 41.66 KG/M2 | OXYGEN SATURATION: 96 %

## 2020-02-07 PROBLEM — Z96.651 S/P TKR (TOTAL KNEE REPLACEMENT) USING CEMENT, RIGHT: Status: ACTIVE | Noted: 2020-02-07

## 2020-02-07 LAB
ANION GAP SERPL CALC-SCNC: 8 MMOL/L (ref 7–16)
BUN SERPL-MCNC: 30 MG/DL (ref 8–23)
CALCIUM SERPL-MCNC: 8.8 MG/DL (ref 8.3–10.4)
CHLORIDE SERPL-SCNC: 103 MMOL/L (ref 98–107)
CO2 SERPL-SCNC: 23 MMOL/L (ref 21–32)
CREAT SERPL-MCNC: 1.44 MG/DL (ref 0.6–1)
GLUCOSE BLD STRIP.AUTO-MCNC: 311 MG/DL (ref 65–100)
GLUCOSE BLD STRIP.AUTO-MCNC: 395 MG/DL (ref 65–100)
GLUCOSE SERPL-MCNC: 315 MG/DL (ref 65–100)
POTASSIUM SERPL-SCNC: 5.3 MMOL/L (ref 3.5–5.1)
SODIUM SERPL-SCNC: 134 MMOL/L (ref 136–145)

## 2020-02-07 PROCEDURE — 74011250637 HC RX REV CODE- 250/637: Performed by: ORTHOPAEDIC SURGERY

## 2020-02-07 PROCEDURE — 97161 PT EVAL LOW COMPLEX 20 MIN: CPT

## 2020-02-07 PROCEDURE — 82962 GLUCOSE BLOOD TEST: CPT

## 2020-02-07 PROCEDURE — 97165 OT EVAL LOW COMPLEX 30 MIN: CPT

## 2020-02-07 PROCEDURE — 74011636637 HC RX REV CODE- 636/637: Performed by: HOSPITALIST

## 2020-02-07 PROCEDURE — 97535 SELF CARE MNGMENT TRAINING: CPT

## 2020-02-07 PROCEDURE — 80048 BASIC METABOLIC PNL TOTAL CA: CPT

## 2020-02-07 PROCEDURE — 97150 GROUP THERAPEUTIC PROCEDURES: CPT

## 2020-02-07 PROCEDURE — 74011250636 HC RX REV CODE- 250/636: Performed by: ORTHOPAEDIC SURGERY

## 2020-02-07 PROCEDURE — 36415 COLL VENOUS BLD VENIPUNCTURE: CPT

## 2020-02-07 PROCEDURE — 97116 GAIT TRAINING THERAPY: CPT

## 2020-02-07 PROCEDURE — 97110 THERAPEUTIC EXERCISES: CPT

## 2020-02-07 RX ORDER — OXYCODONE HYDROCHLORIDE 5 MG/1
5-10 TABLET ORAL
Qty: 60 TAB | Refills: 0 | Status: SHIPPED | OUTPATIENT
Start: 2020-02-07 | End: 2020-02-14

## 2020-02-07 RX ORDER — ASPIRIN 81 MG/1
81 TABLET ORAL EVERY 12 HOURS
Qty: 60 TAB | Refills: 0 | Status: SHIPPED | OUTPATIENT
Start: 2020-02-07 | End: 2020-03-08

## 2020-02-07 RX ADMIN — INSULIN LISPRO 8 UNITS: 100 INJECTION, SOLUTION INTRAVENOUS; SUBCUTANEOUS at 09:03

## 2020-02-07 RX ADMIN — Medication 1 AMPULE: at 09:02

## 2020-02-07 RX ADMIN — ACETAMINOPHEN 1000 MG: 500 TABLET, FILM COATED ORAL at 06:34

## 2020-02-07 RX ADMIN — OXYCODONE 10 MG: 5 TABLET ORAL at 06:34

## 2020-02-07 RX ADMIN — OXYCODONE 10 MG: 5 TABLET ORAL at 10:23

## 2020-02-07 RX ADMIN — INSULIN LISPRO 10 UNITS: 100 INJECTION, SOLUTION INTRAVENOUS; SUBCUTANEOUS at 11:58

## 2020-02-07 RX ADMIN — DILTIAZEM HYDROCHLORIDE 240 MG: 240 CAPSULE, COATED, EXTENDED RELEASE ORAL at 08:59

## 2020-02-07 RX ADMIN — ACETAMINOPHEN 1000 MG: 500 TABLET, FILM COATED ORAL at 00:27

## 2020-02-07 RX ADMIN — LISINOPRIL AND HYDROCHLOROTHIAZIDE 1 TABLET: 12.5; 2 TABLET ORAL at 08:59

## 2020-02-07 RX ADMIN — CELECOXIB 200 MG: 200 CAPSULE ORAL at 08:59

## 2020-02-07 RX ADMIN — Medication 2 G: at 03:13

## 2020-02-07 RX ADMIN — INSULIN HUMAN 35 UNITS: 100 INJECTION, SUSPENSION SUBCUTANEOUS at 09:02

## 2020-02-07 RX ADMIN — DOCUSATE SODIUM 50MG AND SENNOSIDES 8.6MG 2 TABLET: 8.6; 5 TABLET, FILM COATED ORAL at 08:59

## 2020-02-07 RX ADMIN — ACETAMINOPHEN 1000 MG: 500 TABLET, FILM COATED ORAL at 12:07

## 2020-02-07 RX ADMIN — ASPIRIN 81 MG: 81 TABLET ORAL at 08:59

## 2020-02-07 RX ADMIN — Medication 10 ML: at 03:13

## 2020-02-07 RX ADMIN — METFORMIN HYDROCHLORIDE 500 MG: 500 TABLET, FILM COATED, EXTENDED RELEASE ORAL at 09:00

## 2020-02-07 NOTE — PROGRESS NOTES
Pt resting quietly in bed. Wearing c-pap. Daughter-in-law at bedside. Ancef given IV. Pt denied need for pain med at this time.

## 2020-02-07 NOTE — CONSULTS
Hospitalist Note     Admit Date:  2020  9:27 AM   Name:  Adán Jovel   Age:  81 y.o.  :  1950   MRN:  [id number]   PCP:  Zoraida Aviles NP  Treatment Team: Attending Provider: Jeanne Wise MD; Utilization Review: Ramy Garcia; Consulting Provider: Linda Macdonald DO; Occupational Therapist: Samira Holland OT    HPI/Subjective:   Hospitalist consulted for comanagement of diabetes    Today is postop day 0 status post right total knee arthroplasty. Chart was reviewed including vital signs, labs, imaging studies. Patient was not seen or evaluated by me. Past Medical History:   Diagnosis Date    Breast cancer ([facility] [address]     right breast    DJD (degenerative joint disease) 2013    Bilateral knees    Essential hypertension, benign 2013    on med for control     Generalized osteoarthrosis, involving multiple sites 2013    Hand eczema 2013    Heart murmur     echo 18: estimated EF 55%    Mixed hyperlipidemia 2013    on med    Morbid obesity ([facility] [address] 2013    Routine general medical examination at a health care facility 2013    Type II or unspecified type diabetes mellitus without mention of complication, not stated as uncontrolled 2013    oral and insulin meds; checks bs 4 x day; average bs ranges (140-200); last A1C=8.2 on 20. No problems with hypoglycemia.  Unspecified sleep apnea 2013    c-pap; instructed to bring dos. Past Surgical History:   Procedure Laterality Date    BREAST SURGERY PROCEDURE UNLISTED  2010    let breast cyst aspirated    HX BREAST LUMPECTOMY Right     cancer    HX CHOLECYSTECTOMY  1981    HX KNEE REPLACEMENT Left 2019    HX TONSILLECTOMY  ~1950    HX TUBAL LIGATION        Allergies   Allergen Reactions    Ambien [Zolpidem] Palpitations     Ambien CR heart race.  ambien-hypnotics hallucinations    Erythromycin Other (comments)     Increased heart rate     Norvasc [Amlodipine] Other (comments)     Leg swelling      Social History     Tobacco Use    Smoking status: Never Smoker    Smokeless tobacco: Never Used   Substance Use Topics    Alcohol use: No     Alcohol/week: 0.0 standard drinks      Family History   Problem Relation Age of Onset    Heart Disease Mother     Elevated Lipids Mother     Hypertension Mother     Diabetes Mother         type 2    Dementia Father 80    Heart Disease Father     Elevated Lipids Father     Hypertension Father     Diabetes Father         type 2    No Known Problems Sister     No Known Problems Brother     Breast Cancer Sister     Stroke Maternal Grandmother     No Known Problems Daughter     No Known Problems Son       Immunization History   Administered Date(s) Administered    Influenza High Dose Vaccine PF 10/08/2019    Influenza Vaccine (Quad) 11/02/2015    Pneumococcal Conjugate (PCV-13) 07/24/2017    Pneumococcal Polysaccharide (PPSV-23) 03/22/2019    Pneumococcal Vaccine (Unspecified Type) 08/01/2012    TB Skin Test (PPD) Intradermal 05/23/2019    Tdap 09/05/2019    Zoster Recombinant 03/22/2019, 06/18/2019     PTA Medications:  Prior to Admission Medications   Prescriptions Last Dose Informant Patient Reported? Taking? Insulin Syringe-Needle U-100 0.5 mL 31 gauge x 5/16\" syrg 2/6/2020 at Unknown time  No Yes   Sig: Use with insulin up to 5 times daily, E11.65   acetaminophen (TYLENOL) 325 mg tablet 1/30/2020 at Unknown time  Yes Yes   Sig: Take  by mouth every four (4) hours as needed for Pain. aspirin delayed-release 81 mg tablet 2/5/2020 at Unknown time  Yes Yes   Sig: Take 81 mg by mouth daily. dilTIAZem CD (CARDIZEM CD) 240 mg ER capsule 2/5/2020 at Unknown time  No Yes   Sig: Take 1 Cap by mouth daily. ergocalciferol (ERGOCALCIFEROL) 50,000 unit capsule 2/5/2020 at Unknown time  No Yes   Sig: Take 1 Cap by mouth every seven (7) days.    Patient taking differently: Take 50,000 Units by mouth every seven (7) days. Every Saturday   glucose blood VI test strips (ONETOUCH VERIO) strip 2020 at Unknown time  No Yes   Sig: Check glucose 4 times daily, E11.65   insulin NPH (NOVOLIN N NPH U-100 INSULIN) 100 unit/mL injection 2020 at Unknown time  No Yes   Si Units by SubCUTAneous route Before breakfast and dinner. Patient taking differently: 35 Units by SubCUTAneous route Before breakfast and dinner. Patient instructed to take 80% or 28 units of NPH insulin the day before surgery (both AM and PM dose). Patient instructed to take 1/2 or 17 units of NPH insulin on the day of surgery if glucose is greater than 120, if glucose is less than 120 then HOLD AM dose of NPH insulin. insulin regular (NOVOLIN R REGULAR U-100 INSULN) 100 unit/mL injection 2020 at Unknown time  No Yes   Si units before breakfast, 20 units before lunch and supper plus correction 1/20>150, max daily dose 60 units   Patient taking differently: 20 units before breakfast, 20 units before lunch and supper plus correction 1/20>150, max daily dose 60 units   lancets (ONE TOUCH DELICA) 33 gauge [facility] 6317 at Unknown time  No Yes   Sig: Check glucose 4 times daily, E11.65   lisinopril-hydroCHLOROthiazide (PRINZIDE, ZESTORETIC) 20-12.5 mg per tablet 2020 at Unknown time  No Yes   Sig: Take 1 Tab by mouth daily. Indications: high blood pressure   melatonin tab tablet 2020 at Unknown time  Yes Yes   Sig: Take 5 mg by mouth nightly. metFORMIN ER (GLUCOPHAGE XR) 500 mg tablet 2020 at Unknown time  No Yes   Sig: Take 1 Tab by mouth Before breakfast and dinner. Indications: type 2 diabetes mellitus   Patient taking differently: Take 500 mg by mouth Before breakfast and dinner. Non-formulary medication. Patient instructed to bring medication to the hospital on the DOS, in the original bottle, and give to nurse.   Indications: type 2 diabetes mellitus   rosuvastatin (CRESTOR) 10 mg tablet 2020 at Unknown time  No Yes Sig: Take 1 Tab by mouth nightly. Facility-Administered Medications: None       Objective:     Patient Vitals for the past 24 hrs:   Temp Pulse Resp BP SpO2   02/06/20 1656 -- -- -- -- 95 %   02/06/20 1639 97.5 °F (36.4 °C) 88 16 163/63 92 %   02/06/20 1615 -- 81 16 142/65 95 %   02/06/20 1600 -- 82 16 141/69 95 %   02/06/20 1545 -- 85 17 130/62 95 %   02/06/20 1530 -- 82 16 130/64 94 %   02/06/20 1515 -- 77 17 130/62 98 %   02/06/20 1500 -- 75 16 135/64 97 %   02/06/20 1445 -- 73 16 141/64 96 %   02/06/20 1430 -- 72 16 124/56 100 %   02/06/20 1425 -- 82 16 124/60 94 %   02/06/20 1420 -- 78 15 105/55 98 %   02/06/20 1415 -- 76 16 105/53 95 %   02/06/20 1414 98 °F (36.7 °C) 74 14 106/65 94 %   02/06/20 1147 -- 84 16 143/65 95 %   02/06/20 1132 -- 79 16 138/64 92 %   02/06/20 1127 -- 85 16 127/61 95 %   02/06/20 1122 -- 90 16 136/64 93 %   02/06/20 1104 -- 93 16 151/66 96 %   02/06/20 1000 -- -- -- 164/68 --   02/06/20 0959 98.7 °F (37.1 °C) 89 16 -- 94 %     Oxygen Therapy  O2 Sat (%): 95 % (02/06/20 1656)  Pulse via Oximetry: 79 beats per minute (02/06/20 1656)  O2 Device: Room air (02/06/20 1656)  O2 Flow Rate (L/min): 2 l/min (02/06/20 1515)      Intake/Output Summary (Last 24 hours) at 2/6/2020 1931  Last data filed at 2/6/2020 1628  Gross per 24 hour   Intake 1875 ml   Output 150 ml   Net 1725 ml       *Note that automatically entered I/Os may not be accurate; dependent on patient compliance with collection and accurate  by assistants. I reviewed the labs, imaging, EKGs, telemetry, and other studies done this admission.   Data Review:   Recent Results (from the past 24 hour(s))   GLUCOSE, POC    Collection Time: 02/06/20 10:24 AM   Result Value Ref Range    Glucose (POC) 192 (H) 65 - 100 mg/dL   HCG URINE, QL. - POC    Collection Time: 02/06/20  1:50 PM   Result Value Ref Range    Pregnancy test,urine (POC) NEGATIVE  NEG     GLUCOSE, POC    Collection Time: 02/06/20  2:16 PM   Result Value Ref Range    Glucose (POC) 125 (H) 65 - 100 mg/dL   HEMOGLOBIN    Collection Time: 02/06/20  6:42 PM   Result Value Ref Range    HGB 10.0 (L) 11.7 - 15.4 g/dL       All Micro Results     None          Current Facility-Administered Medications   Medication Dose Route Frequency    [START ON 2/7/2020] dilTIAZem CD (CARDIZEM CD) capsule 240 mg  240 mg Oral DAILY    insulin NPH (NOVOLIN N, HUMULIN N) injection 35 Units  35 Units SubCUTAneous ACB&D    [START ON 2/7/2020] lisinopril-hydroCHLOROthiazide (PRINZIDE, ZESTORETIC) 20-12.5 mg per tablet 1 Tab  1 Tab Oral DAILY    rosuvastatin (CRESTOR) tablet 10 mg  10 mg Oral QHS    alcohol 62% (NOZIN) nasal  1 Ampule  1 Ampule Topical Q12H    0.9% sodium chloride infusion  100 mL/hr IntraVENous CONTINUOUS    sodium chloride (NS) flush 5-40 mL  5-40 mL IntraVENous Q8H    sodium chloride (NS) flush 5-40 mL  5-40 mL IntraVENous PRN    ceFAZolin (ANCEF) 2 g/20 mL in sterile water IV syringe  2 g IntraVENous Q8H    [START ON 2/7/2020] acetaminophen (TYLENOL) tablet 1,000 mg  1,000 mg Oral Q6H    celecoxib (CELEBREX) capsule 200 mg  200 mg Oral Q12H    oxyCODONE IR (ROXICODONE) tablet 5-10 mg  5-10 mg Oral Q4H PRN    HYDROmorphone (PF) (DILAUDID) injection 1 mg  1 mg IntraVENous Q3H PRN    naloxone (NARCAN) injection 0.2-0.4 mg  0.2-0.4 mg IntraVENous Q10MIN PRN    [START ON 2/7/2020] dexamethasone (DECADRON) injection 10 mg  10 mg IntraVENous ONCE    promethazine (PHENERGAN) tablet 25 mg  25 mg Oral Q6H PRN    diphenhydrAMINE (BENADRYL) capsule 25 mg  25 mg Oral Q4H PRN    [START ON 2/7/2020] senna-docusate (PERICOLACE) 8.6-50 mg per tablet 2 Tab  2 Tab Oral DAILY    aspirin delayed-release tablet 81 mg  81 mg Oral Q12H    ondansetron (ZOFRAN ODT) tablet 8 mg  8 mg Oral Q8H PRN    metFORMIN (GLUMETZA ER) 24 hour tablet 1,000 mg (Patient Supplied)  1,000 mg Oral BID    insulin lispro (HUMALOG) injection   SubCUTAneous AC&HS       Other Studies:  No results found.    Assessment and Plan:     Hospital Problems as of 2/6/2020 Date Reviewed: 1/8/2020          Codes Class Noted - Resolved POA    Arthritis of knee, right ICD-10-CM: M17.11  ICD-9-CM: 716.96  2/6/2020 - Present Unknown              Plan: This is a 61-year-old female with    Insulin-dependent diabetes mellitus type 2  A1c of 8.2. Currently on NPH insulin, metformin. If NPH unavailable, can start with Lantus 15 units at bedtime  DC regular insulin and add low-dose sliding scale insulin. Hospitalist will sign off. Please do not hesitate to contact us with questions. No bill will be charged for this encounter.     Signed:  Jaziel Ashton MD

## 2020-02-07 NOTE — PROGRESS NOTES
Problem: Mobility Impaired (Adult and Pediatric)  Goal: *Acute Goals and Plan of Care (Insert Text)  Description  GOALS (1-4 days):  (1.)Ms. Kaleb Ayala will move from supine to sit and sit to supine  in bed with SUPERVISION. (2.)Ms. Kaleb Ayala will transfer from bed to chair and chair to bed with STAND BY ASSIST using the least restrictive device. (3.)Ms. Kaleb Ayala will ambulate with STAND BY ASSIST for 250 feet with the least restrictive device. (4.)Ms. Kaleb Ayala will increase right knee ROM to 5°-80°.  ________________________________________________________________________________________________     Outcome: Progressing Towards Goal     PHYSICAL THERAPY JOINT CAMP TKA: Daily Note, Treatment Day: Day of Assessment and PM 2/7/2020  INPATIENT: Hospital Day: 2  [place] / [place] / Product Type: Medicare /      NAME/AGE/GENDER: Shaun Mcdonough is a 81 y.o. female   PRIMARY DIAGNOSIS:  Unilateral primary osteoarthritis, right knee [M17.11]   Procedure(s) and Anesthesia Type:     * KNEE ARTHROPLASTY TOTAL/ RIGHT/ - Spinal (Right)  ICD-10: Treatment Diagnosis:    · Pain in Right Knee (M25.561)  · Stiffness of Right Knee, Not elsewhere classified (M25.661)  · Difficulty in walking, Not elsewhere classified (R26.2)      ASSESSMENT:     Ms. Kaleb Ayala presents up in chair on contact and agreeable to therapy. Worked on gait training in the palafox with verbal cues. In gym worked on TKA exercises with verbal cues progressing with repetitions. Reviewed HEP and use of ice along with frequency. Pt walked back to her room and stayed up in chair with needs in reach. On discharge from the hospital she plans to go home with HHPT for follow up. This section established at most recent assessment   PROBLEM LIST (Impairments causing functional limitations):  1. Decreased Strength  2. Decreased ADL/Functional Activities  3. Decreased Transfer Abilities  4. Decreased Ambulation Ability/Technique  5.  Increased Pain  6. Decreased Flexibility/Joint Mobility  7. Edema/Girth  8. Decreased [facility] with Home Exercise Program   INTERVENTIONS PLANNED: (Benefits and precautions of physical therapy have been discussed with the patient.)  1. Cold  2. bed mobility  3. gait training  4. home exercise program (HEP)  5. Range of Motion: active/assisted/passive  6. Therapeutic Activities  7. therapeutic exercise/strengthening  8. transfer training  9. Group Therapy     TREATMENT PLAN: Frequency/Duration: Follow patient BID for duration of hospital stay to address above goals. Rehabilitation Potential For Stated Goals: Good     RECOMMENDED REHABILITATION/EQUIPMENT: (at time of discharge pending progress): Continue Skilled Therapy, Home Health: Physical Therapy, and Outpatient: Physical Therapy. HISTORY:   History of Present Injury/Illness (Reason for Referral):  Pt s/p right TKA on 2/6/2020  Past Medical History/Comorbidities:   Ms. Sarah Alvarado  has a past medical history of Breast cancer ([address] (2007), DJD (degenerative joint disease) (04/30/2013), Essential hypertension, benign (04/30/2013), Generalized osteoarthrosis, involving multiple sites (4/30/2013), Hand eczema (4/30/2013), Heart murmur, Mixed hyperlipidemia (04/30/2013), Morbid obesity ([address] (04/30/2013), Routine general medical examination at a health care facility (4/30/2013), Type II or unspecified type diabetes mellitus without mention of complication, not stated as uncontrolled (04/30/2013), and Unspecified sleep apnea (04/30/2013).  She also has no past medical history of Adverse effect of anesthesia, Aneurysm ([address] Arrhythmia, Asthma, Autoimmune disease ([address] CAD (coronary artery disease), Chronic kidney disease, Chronic obstructive pulmonary disease ([address] Chronic pain, Coagulation disorder ([address] Difficult intubation, Endocarditis, GERD (gastroesophageal reflux disease), Heart failure ([address] Ill-defined condition, Liver disease, Malignant hyperthermia due to anesthesia, Nausea & vomiting, Nicotine vapor product user, Non-nicotine vapor product user, Pseudocholinesterase deficiency, Psychiatric disorder, PUD (peptic ulcer disease), Rheumatic fever, Seizures ([address] Stroke ([address] Thromboembolus ([address] or Thyroid disease. Ms. Sherrell Gooden  has a past surgical history that includes pr breast surgery procedure unlisted (08/2010); hx cholecystectomy (1981); hx tonsillectomy (~1950); hx tubal ligation (1984); hx breast lumpectomy (Right, 2007); and hx knee replacement (Left, 05/2019). Social History/Living Environment:   Home Environment: Private residence  # Steps to Enter: 4  Wheelchair Ramp: Yes  One/Two Story Residence: One story  Living Alone: No  Support Systems: Spouse/Significant Other/Partner  Patient Expects to be Discharged to[de-identified] Private residence  Current DME Used/Available at Home: Raised toilet seat, Walker, rolling  Tub or Shower Type: Tub/Shower combination  Prior Level of Function/Work/Activity:  Pt living at home, was using a walker for mobility   Number of Personal Factors/Comorbidities that affect the Plan of Care: 0: LOW COMPLEXITY   EXAMINATION:   Most Recent Physical Functioning:   Gross Assessment: Yes  Gross Assessment  AROM: Within functional limits(except right lower extremity s/p TKA)  Strength: Within functional limits(except right lower extremity s/p TKA)        RLE AROM  R Knee Flexion: 88  R Knee Extension: 3            Bed Mobility  Supine to Sit: Stand-by assistance    Transfers  Sit to Stand: Stand-by assistance  Stand to Sit: Stand-by assistance    Balance  Sitting: Intact  Standing: Intact; With support    Posture  Posture (WDL): Within defined limits    Gait Training: Yes    Weight Bearing Status  Right Side Weight Bearing: As tolerated  Distance (ft): 202 Feet (ft)(and another 202 feet)  Ambulation - Level of Assistance: Stand-by assistance  Assistive Device: Walker, rolling  Speed/Radha: Pace decreased (<100 feet/min)  Step Length: Left shortened  Stance: Right decreased  Gait Abnormalities: Antalgic  Interventions: Safety awareness training;Verbal cues     Braces/Orthotics: none    Right Knee Cold  Type: Cryocuff      Body Structures Involved:  1. Joints  2. Muscles Body Functions Affected:  1. Movement Related Activities and Participation Affected:  1. Mobility  2. Self Care   Number of elements that affect the Plan of Care: 4+: HIGH COMPLEXITY   CLINICAL PRESENTATION:   Presentation: Stable and uncomplicated: LOW COMPLEXITY   CLINICAL DECISION MAKIN[address] [place] [id number] AM-PAC 6 Clicks   Basic Mobility Inpatient Short Form  How much difficulty does the patient currently have. .. Unable A Lot A Little None   1. Turning over in bed (including adjusting bedclothes, sheets and blankets)? [] 1   [] 2   [x] 3   [] 4   2. Sitting down on and standing up from a chair with arms ( e.g., wheelchair, bedside commode, etc.)   [] 1   [] 2   [x] 3   [] 4   3. Moving from lying on back to sitting on the side of the bed? [] 1   [] 2   [x] 3   [] 4   How much help from another person does the patient currently need. .. Total A Lot A Little None   4. Moving to and from a bed to a chair (including a wheelchair)? [] 1   [] 2   [x] 3   [] 4   5. Need to walk in hospital room? [] 1   [] 2   [x] 3   [] 4   6. Climbing 3-5 steps with a railing? [] 1   [] 2   [x] 3   [] 4   © , Trustees of [address] [place] [id number], under license to [employer]. All rights reserved     Score:  Initial: 18 Most Recent: X (Date: -- )    Interpretation of Tool:  Represents activities that are increasingly more difficult (i.e. Bed mobility, Transfers, Gait).     Medical Necessity:     · Patient is expected to demonstrate progress in   · strength, range of motion, and functional technique  ·  to   · decrease assistance required with functional mobility and TKA management   · .  Reason for Services/Other Comments:  · Patient continues to require skilled intervention due to · Inability to complete functional mobility and TKA management independently   · . Use of outcome tool(s) and clinical judgement create a POC that gives a: Clear prediction of patient's progress: LOW COMPLEXITY            TREATMENT:   (In addition to Assessment/Re-Assessment sessions the following treatments were rendered)     Pre-treatment Symptoms/Complaints:  none  Pain Initial:   Pain Intensity 1: (sore after therapy)  Post Session: sore after therapy     Therapeutic Exercise: (45 Minutes(group)):  Exercises per grid below to improve mobility and strength. Required minimal verbal and manual cues to promote proper body alignment and promote proper body posture. Progressed repetitions as indicated. Date:  2/7/2020 Date:   Date:     ACTIVITY/EXERCISE AM PM AM PM AM PM   GROUP THERAPY  []  [x]  []  []  []  []   Ankle Pumps 10 15       Quad Sets 10 15       Gluteal Sets 10 15       Hip ABd/ADduction 10 15       Straight Leg Raises 10 15       Knee Slides 10 15       Short Arc Quads  15       Long Arc Quads         Chair Slides  15                B = bilateral; AA = active assistive; A = active; P = passive      Treatment/Session Assessment:     Response to Treatment:  pt tolerated well, progressing nicely    Education:  [x] Home Exercises  [x] Fall Precautions  [x] No pillow under knee [x] D/C Instruction Review  [x] Knee Prosthesis Review  [x] Walker Management/Safety [] Adaptive Equipment as Needed       Interdisciplinary Collaboration:   o Registered Nurse  o Rehabilitation Attendant    After treatment position/precautions:   o Up in chair  o Bed/Chair-wheels locked  o Call light within reach  o Family at bedside    Compliance with Program/Exercises: Compliant all of the time. Recommendations/Intent for next treatment session:  Treatment next visit will focus on increasing Ms. Santillan's independence with bed mobility, transfers, gait training, strength/ROM exercises, modalities for pain, and patient education.       Total Treatment Duration:  PT Patient Time In/Time Out  Time In: 1300  Time Out: [address], [place]

## 2020-02-07 NOTE — PROGRESS NOTES
Orthopedic Joint Progress Note    2020  Admit Date: 2020  Admit Diagnosis: Unilateral primary osteoarthritis, right knee [M17.11]  Arthritis of knee, right [M17.11]    1 Day Post-Op    Subjective:     Gwendolyn Kaylene awake and alert    Review of Systems: Pertinent items are noted in HPI. Objective:     PT/OT:     PATIENT MOBILITY                           Vital Signs:    Blood pressure 146/55, pulse 89, temperature 97 °F (36.1 °C), resp. rate 16, height 5' 4\" (1.626 m), weight 110.7 kg (244 lb), SpO2 94 %, not currently breastfeeding.   Temp (24hrs), Av.9 °F (36.6 °C), Min:97 °F (36.1 °C), Max:98.7 °F (37.1 °C)      Pain Control:   Pain Assessment  Pain Scale 1: Numeric (0 - 10)  Pain Intensity 1: 4  Pain Onset 1: years  Pain Location 1: Knee  Pain Orientation 1: Right  Pain Description 1: Aching  Pain Intervention(s) 1: Medication (see MAR)    Meds:  Current Facility-Administered Medications   Medication Dose Route Frequency    dilTIAZem CD (CARDIZEM CD) capsule 240 mg  240 mg Oral DAILY    lisinopril-hydroCHLOROthiazide (PRINZIDE, ZESTORETIC) 20-12.5 mg per tablet 1 Tab  1 Tab Oral DAILY    rosuvastatin (CRESTOR) tablet 10 mg  10 mg Oral QHS    alcohol 62% (NOZIN) nasal  1 Ampule  1 Ampule Topical Q12H    0.9% sodium chloride infusion  100 mL/hr IntraVENous CONTINUOUS    sodium chloride (NS) flush 5-40 mL  5-40 mL IntraVENous Q8H    sodium chloride (NS) flush 5-40 mL  5-40 mL IntraVENous PRN    acetaminophen (TYLENOL) tablet 1,000 mg  1,000 mg Oral Q6H    celecoxib (CELEBREX) capsule 200 mg  200 mg Oral Q12H    oxyCODONE IR (ROXICODONE) tablet 5-10 mg  5-10 mg Oral Q4H PRN    HYDROmorphone (PF) (DILAUDID) injection 1 mg  1 mg IntraVENous Q3H PRN    naloxone (NARCAN) injection 0.2-0.4 mg  0.2-0.4 mg IntraVENous Q10MIN PRN    dexamethasone (DECADRON) injection 10 mg  10 mg IntraVENous ONCE    promethazine (PHENERGAN) tablet 25 mg  25 mg Oral Q6H PRN    diphenhydrAMINE (BENADRYL) capsule 25 mg  25 mg Oral Q4H PRN    senna-docusate (PERICOLACE) 8.6-50 mg per tablet 2 Tab  2 Tab Oral DAILY    aspirin delayed-release tablet 81 mg  81 mg Oral Q12H    ondansetron (ZOFRAN ODT) tablet 8 mg  8 mg Oral Q8H PRN    insulin lispro (HUMALOG) injection   SubCUTAneous AC&HS    insulin NPH (NOVOLIN N, HUMULIN N) injection 35 Units  35 Units SubCUTAneous ACB/HS    metFORMIN (GLUMETZA ER) 24 hour tablet 500 mg (Patient Supplied)  500 mg Oral BID        LAB:    Lab Results   Component Value Date/Time    INR 0.9 01/23/2020 10:00 AM    INR 1.0 05/15/2019 09:05 AM    INR 0.9 08/27/2018 08:00 AM     Lab Results   Component Value Date/Time    HGB 10.0 (L) 02/06/2020 06:42 PM    HGB 11.4 (L) 01/23/2020 10:00 AM    HGB 10.0 (L) 05/23/2019 07:41 PM       Wound Knee Left (Active)   Number of days: 260       Wound Knee Right (Active)   Dressing Status Clean, dry, and intact 2/7/2020  3:13 AM   Dressing Type Aquacel 2/6/2020  7:45 PM   Number of days: 1         Physical Exam:  Calves soft/ neuro intact      Assessment:      Principal Problem:    S/P TKR (total knee replacement) using cement, right (2/7/2020)    Active Problems:    Arthritis of knee, right (2/6/2020)         Plan:     Continue PT/OT/Rehab  Consult: Rehab team including PT, OT, recreational therapy, and    Home soon    Patient Expects to be Discharged to[de-identified] Private residence

## 2020-02-07 NOTE — DISCHARGE INSTRUCTIONS
[address] [facility]   Patient Discharge Instructions    Shaun Mcdonough / [id number] : 1950    Admitted 2020 Discharged: 2020     IF YOU HAVE ANY PROBLEMS ONCE YOU ARE AT HOME CALL THE FOLLOWING NUMBERS:   Main office number: [phone]    Take Home Medications     · It is important that you take the medication exactly as they are prescribed. · Keep your medication in the bottles provided by the pharmacist and keep a list of the medication names, dosages, and times to be taken in your wallet. · Do not take other medications without consulting your doctor. What to do at [address] your prehospital diet. If you have excessive nausea or vomitting call your doctor's office     Home Physical Therapy is arranged. Use rolling walker when walking. Patients who have had a joint replacement should not drive until you are seen for your follow up appointment by Dr. Skylar Bal. When to Call    - Call if you have a temperature greater then 101  - Unable to keep food down  - Loose control of your bladder or bowel function  - Are unable to bear any weight   - Need a pain medication refill       DISCHARGE SUMMARY from Nurse    The following personal items collected during your admission are returned to you:   Dental Appliance: Dental Appliances: None  Vision: Visual Aid: Glasses  Hearing Aid:    Jewelry: Jewelry: None  Clothing: Clothing: At bedside  Other Valuables: Other Valuables: Cell Phone, Wallet(in car or with son)  Valuables sent to safe:      PATIENT INSTRUCTIONS:    After general anesthesia or intravenous sedation, for 24 hours or while taking prescription Narcotics:  · Limit your activities  · Do not drive and operate hazardous machinery  · Do not make important personal or business decisions  · Do  not drink alcoholic beverages  · If you have not urinated within 8 hours after discharge, please contact your surgeon on call.     Report the following to your surgeon:  · Excessive pain, swelling, redness or odor of or around the surgical area  · Temperature over 101  · Nausea and vomiting lasting longer than 4 hours or if unable to take medications  · Any signs of decreased circulation or nerve impairment to extremity: change in color, persistent  numbness, tingling, coldness or increase pain  · Any questions, call office @ [phone]      Keep scheduled follow up appointment. If need to change, call office @ [phone]. *  Please give a list of your current medications to your Primary Care Provider. *  Please update this list whenever your medications are discontinued, doses are      changed, or new medications (including over-the-counter products) are added. *  Please carry medication information at all times in case of emergency situations. Patient Education        Total Knee Replacement: What to Expect at Home  Your Recovery    When you leave the hospital, you should be able to move around with a walker or crutches. But you will need someone to help you at home for the next few weeks or until you have more energy and can move around better. If you need more extensive rehab, you may go to a specialized rehab center for more treatment. You will go home with a bandage and stitches, staples, tissue glue, or tape strips. Change the bandage as your doctor tells you to. If you have stitches or staples, your doctor will remove them 10 to 21 days after your surgery. Glue or tape strips will fall off on their own over time. You may still have some mild pain, and the area may be swollen for 3 to 6 months after surgery. Your knee will continue to improve for 6 to 12 months. You will probably use a walker for 1 to 3 weeks and then use crutches. When you are ready, you can use a cane. You will probably be able to walk on your own in 4 to 8 weeks. You will need to do months of physical rehabilitation (rehab) after a knee replacement.  Rehab will help you strengthen the muscles of the knee and help you regain movement. After you recover, your artificial knee will allow you to do normal daily activities with less pain or no pain at all. You may be able to hike, dance, ride a bike, and play golf. Talk to your doctor about whether you can do more strenuous activities. Always tell your caregivers that you have an artificial knee. How long it will take to walk on your own, return to normal activities, and go back to work depends on your health and how well your rehabilitation (rehab) program goes. The better you do with your rehab exercises, the quicker you will get your strength and movement back. This care sheet gives you a general idea about how long it will take for you to recover. But each person recovers at a different pace. Follow the steps below to get better as quickly as possible. How can you care for yourself at home? Activity    · Rest when you feel tired. You may take a nap, but do not stay in bed all day. When you sit, use a chair with arms. You can use the arms to help you stand up.     · Work with your physical therapist to find the best way to exercise. What you can do as your knee heals will depend on whether your new knee is cemented or uncemented. You may not be able to do certain things for a while if your new knee is uncemented.     · After your knee has healed enough, you can do more strenuous activities with caution. ? You can golf, but use a golf cart, and do not wear shoes with spikes. ? You can bike on a flat road or on a stationary bike. Avoid biking up hills. ? Your doctor may suggest that you stay away from activities that put stress on your knee. These include tennis or badminton, squash or racquetball, contact sports like football, jumping (such as in basketball), jogging, or running. ? Avoid activities where you might fall. These include horseback riding, skiing, and mountain biking.     · Do not sit for more than 1 hour at a time.  Get up and walk around for a while before you sit again. If you must sit for a long time, prop up your leg with a chair or footstool. This will help you avoid swelling.     · Ask your doctor when you can drive again. It may take up to 8 weeks after knee replacement surgery before it is safe for you to drive.     · When you get into a car, sit on the edge of the seat. Then pull in your legs, and turn to face the front.     · You should be able to do many everyday activities 3 to 6 weeks after your surgery. You will probably need to take 4 to 16 weeks off from work. When you can go back to work depends on the type of work you do and how you feel.     · Ask your doctor when it is okay for you to have sex.     · Do not lift anything heavier than 10 pounds and do not lift weights for 12 weeks. Diet    · By the time you leave the hospital, you should be eating your normal diet. If your stomach is upset, try bland, low-fat foods like plain rice, broiled chicken, toast, and yogurt. Your doctor may suggest that you take iron and vitamin supplements.     · Drink plenty of fluids (unless your doctor tells you not to).   · Eat healthy foods, and watch your portion sizes. Try to stay at your ideal weight. Too much weight puts more stress on your new knee.     · You may notice that your bowel movements are not regular right after your surgery. This is common. Try to avoid constipation and straining with bowel movements. You may want to take a fiber supplement every day. If you have not had a bowel movement after a couple of days, ask your doctor about taking a mild laxative. Medicines    · Your doctor will tell you if and when you can restart your medicines. He or she will also give you instructions about taking any new medicines.     · If you take blood thinners, such as warfarin (Coumadin), clopidogrel (Plavix), or aspirin, be sure to talk to your doctor. He or she will tell you if and when to start taking those medicines again.  Make sure that you understand exactly what your doctor wants you to do.     · Your doctor may give you a blood-thinning medicine to prevent blood clots. If you take a blood thinner, be sure you get instructions about how to take your medicine safely. Blood thinners can cause serious bleeding problems. This medicine could be in pill form or as a shot (injection). If a shot is necessary, your doctor will tell you how to do this.     · Be safe with medicines. Take pain medicines exactly as directed. ? If the doctor gave you a prescription medicine for pain, take it as prescribed. ? If you are not taking a prescription pain medicine, ask your doctor if you can take an over-the-counter medicine. ? Plan to take your pain medicine 30 minutes before exercises. It is easier to prevent pain before it starts than to stop it once it has started.     · If you think your pain medicine is making you sick to your stomach:  ? Take your medicine after meals (unless your doctor has told you not to). ? Ask your doctor for a different pain medicine.     · If your doctor prescribed antibiotics, take them as directed. Do not stop taking them just because you feel better. You need to take the full course of antibiotics. Incision care    · If your doctor told you how to care for your cut (incision), follow your doctor's instructions. You will have a dressing over the cut. A dressing helps the incision heal and protects it. Your doctor will tell you how to take care of this.     · If you did not get instructions, follow this general advice:  ? If you have strips of tape on the cut the doctor made, leave the tape on for a week or until it falls off.  ? If you have stitches or staples, your doctor will tell you when to come back to have them removed. ? If you have skin adhesive on the cut, leave it on until it falls off. Skin adhesive is also called glue or liquid stitches. ? Change the bandage every day. ?  Wash the area daily with warm water, and pat it dry. Don't use hydrogen peroxide or alcohol. They can slow healing. ? You may cover the area with a gauze bandage if it oozes fluid or rubs against clothing. ? You may shower 24 to 48 hours after surgery. Pat the incision dry. Don't swim or take a bath for the first 2 weeks, or until your doctor tells you it is okay. Exercise    · Your rehab program will give you a number of exercises to do to help you get back your knee's range of motion and strength. Always do them as your therapist tells you. Ice and elevation    · For pain and swelling, put ice or a cold pack on the area for 10 to 20 minutes at a time. Put a thin cloth between the ice and your skin. Other instructions    · Continue to wear your support stockings as your doctor says. These help to prevent blood clots. The length of time that you will have to wear them depends on your activity level and the amount of swelling.     · You have metal pieces in your knee. These may set off some airport metal detectors. Carry a medical alert card that says you have an artificial joint, just in case. Follow-up care is a key part of your treatment and safety. Be sure to make and go to all appointments, and call your doctor if you are having problems. It's also a good idea to know your test results and keep a list of the medicines you take. When should you call for help? Call 911 anytime you think you may need emergency care. For example, call if:    · You passed out (lost consciousness).     · You have severe trouble breathing.     · You have sudden chest pain and shortness of breath, or you cough up blood.    Call your doctor now or seek immediate medical care if:    · You have signs of infection, such as:  ? Increased pain, swelling, warmth, or redness. ? Red streaks leading from the incision. ? Pus draining from the incision. ? A fever.     · You have signs of a blood clot, such as:  ? Pain in your calf, back of the knee, thigh, or groin. ?  Redness and swelling in your leg or groin.     · Your incision comes open and begins to bleed, or the bleeding increases.     · You have pain that does not get better after you take pain medicine.    Watch closely for changes in your health, and be sure to contact your doctor if:    · You do not have a bowel movement after taking a laxative. Where can you learn more? Go to http://analy-leif.info/. Enter M863 in the search box to learn more about \"Total Knee Replacement: What to Expect at Home. \"  Current as of: June 26, 2019  Content Version: 12.2  © 8160-8533 [employer]. Care instructions adapted under license by [employer] (which disclaims liability or warranty for this information). If you have questions about a medical condition or this instruction, always ask your healthcare professional. [address] any warranty or liability for your use of this information. These are general instructions for a healthy lifestyle:    No smoking/ No tobacco products/ Avoid exposure to second hand smoke    Surgeon General's Warning:  Quitting smoking now greatly reduces serious risk to your health. Obesity, smoking, and sedentary lifestyle greatly increases your risk for illness    A healthy diet, regular physical exercise & weight monitoring are important for maintaining a healthy lifestyle    You may be retaining fluid if you have a history of heart failure or if you experience any of the following symptoms:  Weight gain of 3 pounds or more overnight or 5 pounds in a week, increased swelling in our hands or feet or shortness of breath while lying flat in bed. Please call your doctor as soon as you notice any of these symptoms; do not wait until your next office visit.     Recognize signs and symptoms of STROKE:    F-face looks uneven    A-arms unable to move or move even    S-speech slurred or non-existent    T-time-call 911 as soon as signs and symptoms begin-DO NOT go       Back to bed or wait to see if you get better-TIME IS BRAIN. The discharge information has been reviewed with the patient. The patient verbalized understanding. Information obtained by :  I understand that if any problems occur once I am at home I am to contact my physician. I understand and acknowledge receipt of the instructions indicated above.                                                                                                                                            Physician's or R.N.'s Signature                                                                  Date/Time                                                                                                                                              Patient or Representative Signature                                                          Date/Time

## 2020-02-07 NOTE — DISCHARGE SUMMARY
[address] [place] [address]  Total Joint Discharge Summary      Patient ID:  Kunal Belleville  [id number]  8 y.o.  1950    Admit date: 2/6/2020  Discharge date and time: 2/7/20  Admitting Physician: Blanco Gore MD  Surgeon: Same  Admission Diagnoses: Unilateral primary osteoarthritis, right knee [M17.11]  Arthritis of knee, right [M17.11]  Discharge Diagnoses: Principal Problem:    S/P TKR (total knee replacement) using cement, right (2/7/2020)    Active Problems:    Arthritis of knee, right (2/6/2020)                                Perioperative Antibiotics: Ancef 1 to 2 mg was given depending on patient's weight. If allergic to Ancef or due to other indications, patient was given Vancomycin. Hospital Medications given:   [unfilled]  [unfilled]  [unfilled]    Discharge Medications given:  Current Discharge Medication List      START taking these medications    Details   oxyCODONE IR (ROXICODONE) 5 mg immediate release tablet Take 1-2 Tabs by mouth every four (4) hours as needed for Pain for up to 7 days. Max Daily Amount: 60 mg.  Qty: 60 Tab, Refills: 0    Associated Diagnoses: S/P TKR (total knee replacement) using cement, right         CONTINUE these medications which have CHANGED    Details   aspirin delayed-release 81 mg tablet Take 1 Tab by mouth every twelve (12) hours every twelve (12) hours for 30 days. Qty: 60 Tab, Refills: 0         CONTINUE these medications which have NOT CHANGED    Details   acetaminophen (TYLENOL) 325 mg tablet Take  by mouth every four (4) hours as needed for Pain.      metFORMIN ER (GLUCOPHAGE XR) 500 mg tablet Take 1 Tab by mouth Before breakfast and dinner. Indications: type 2 diabetes mellitus  Qty: 180 Tab, Refills: 1    Associated Diagnoses: Controlled type 2 diabetes mellitus without complication, with long-term current use of insulin (HCC)      ergocalciferol (ERGOCALCIFEROL) 50,000 unit capsule Take 1 Cap by mouth every seven (7) days.   Qty: 12 Cap, Refills: 0    Associated Diagnoses: Vitamin D deficiency      dilTIAZem CD (CARDIZEM CD) 240 mg ER capsule Take 1 Cap by mouth daily. Qty: 90 Cap, Refills: 1    Associated Diagnoses: Essential hypertension, benign      rosuvastatin (CRESTOR) 10 mg tablet Take 1 Tab by mouth nightly. Qty: 90 Tab, Refills: 1    Associated Diagnoses: Mixed hyperlipidemia      lisinopril-hydroCHLOROthiazide (PRINZIDE, ZESTORETIC) 20-12.5 mg per tablet Take 1 Tab by mouth daily. Indications: high blood pressure  Qty: 90 Tab, Refills: 1    Associated Diagnoses: Essential hypertension, benign      insulin NPH (NOVOLIN N NPH U-100 INSULIN) 100 unit/mL injection 30 Units by SubCUTAneous route Before breakfast and dinner.   Qty: 2 Vial, Refills: 11    Associated Diagnoses: Uncontrolled type 2 diabetes mellitus without complication, with long-term current use of insulin ([facility])      insulin regular (NOVOLIN R REGULAR U-100 INSULN) 100 unit/mL injection 18 units before breakfast, 20 units before lunch and supper plus correction 1/20>150, max daily dose 60 units  Qty: 2 Vial, Refills: 11    Associated Diagnoses: Uncontrolled type 2 diabetes mellitus without complication, with long-term current use of insulin ([facility])      glucose blood VI test strips (ONETOUCH VERIO) strip Check glucose 4 times daily, E11.65  Qty: 400 Strip, Refills: 3    Associated Diagnoses: Uncontrolled type 2 diabetes mellitus without complication, with long-term current use of insulin ([facility])      lancets (ONE TOUCH DELICA) 33 gauge misc Check glucose 4 times daily, E11.65  Qty: 400 Lancet, Refills: 3    Associated Diagnoses: Uncontrolled type 2 diabetes mellitus without complication, with long-term current use of insulin ([facility])      Insulin Syringe-Needle U-100 0.5 mL 31 gauge x 5/16\" syrg Use with insulin up to 5 times daily, E11.65  Qty: 500 Syringe, Refills: 3    Associated Diagnoses: Uncontrolled type 2 diabetes mellitus without complication, with long-term current use of insulin (HCC)      melatonin tab tablet Take 5 mg by mouth nightly. Additional DVT Prophylaxis:  SOUTH Hose,Plexi-Pulse    Postoperative transfusions:   none  Post Op complications: none    Hemoglobin at discharge:   Lab Results   Component Value Date/Time    HGB 10.0 (L) 02/06/2020 06:42 PM       Wound appears to be healing without any evidence of infection. Physical Therapy started on the day following surgery and progressed to independent ambulation with the aid of a walker. At the time of discharge, able to go up and down stairs and had understanding of precautions needed following surgery.       PT/OT:            Assistive Device: Walker (comment)                Discharged to: home    Discharge instructions:  -Rx pain medication given  - Anticoagulate with: Ecotrin 81 mg PO BID x 4 weeks  -Resume pre hospital diet             -Resume home medications per medical continuation form     -Ambulate with walker, appropriate total joint protocol  -Follow up in office as scheduled       Signed:  Severa Holiday, MD  2/7/2020  7:23 AM

## 2020-02-07 NOTE — PROGRESS NOTES
02/06/20 2049   Oxygen Therapy   O2 Sat (%) 91 %   Pulse via Oximetry 92 beats per minute   O2 Device Room air   P has home CPAP set up at bedside. Pt working on IS, encourage to keep practicing. No distress noted at this time.

## 2020-02-07 NOTE — PHYSICIAN ADVISORY
Short Stay Review       Pt Name:  Madison Mccauley   MR#  [id number]   [facility]#   [id number]   [address] [place] [address]  324/01  @ [facility]   Hospitalization date  2/6/2020  9:27 AM  No discharge date for patient encounter. Current Attending Physician  Lorelei Capps MD     A discharge order has been placed for this episode of hospital care for Ms. Madison Mccauley; since this hospital stay is less than two midnights, I reviewed Ms. Megha Santillan's chart. INPATIENT is appropriate. Recovery quicker than expected and patient being discharged with a plan. Ms. Mahendra Reina healthcare insurance/benefit include:  [place] / [place] / Product Type: Medicare /     Utilization Review related case summary:   Age  81 y.o.   BMI  Body mass index is 41.88 kg/m². PMHx includes  PMHx : Obesity, Primary Hypertension, Type 2 DM, Sleep Apnea, Osteoarthritis. Hospital course  80 y/o female with a PMH as noted above underwent a right Total knee replacement for Osteoarthritis. Post-operatively, patient required ongoing close monitoring including with follow up hemoglobin, electrolytes, optimization of pain control and PT/OT evaluation with recommendations. Risk of deterioration at the time this patient  was hospitalized  High           On the basis of chart review, this patient's hospitalization status      is appropriate for INPATIENT. The information in this document is a recommendation to be used for utilization review and utilization management purposes only. This recommendation is not an order. The recommendation is made based on the information reviewed at the time of the referral, is pursuant to the [facility] Conditions of Participation (42 CFR Part 482), and is neither a judgment nor an assessment with regard to the appropriateness or quality of clinical care. For all Managed Care patients:  The Criteria are intended solely for use as screening guidelines with respect to the medical appropriateness of healthcare services and not for final clinical or payment determinations concerning the type or level of medical care provided, or proposed to be provided, to a patient. They help the reviewers determine whether a patient is appropriate for observation or inpatient admission at the time a decision to admit the patient is being made. All efforts are made to apply the pertinent payor criteria ([employer] or [employer]) as well as the clinical judgements based on the information reviewed at the time of the referral.\" Nothing in this document may be used to limit, alter, or affect clinical services provided to the patient named. Kendra Maynard, [address] [address] [phone]  [email or web address]    8:29 AM 2/7/2020

## 2020-02-07 NOTE — PROGRESS NOTES
[facility] - INPATIENT  Face to Face Encounter    Patients Name: Jina Hollins    YOB: 1950    Ordering Physician:  Izabel Doshi    Primary Diagnosis: Unilateral primary osteoarthritis, right knee [M17.11]  Arthritis of knee, right [M17.11]   S/p right TKA    Date of Face to Face:   2-6-20                                  Face to Face Encounter findings are related to primary reason for home care:   yes. 1. I certify that the patient needs intermittent care as follows: physical therapy: gait/stair training    2. I certify that this patient is homebound, that is: 1) patient requires the use of a walker device, special transportation, or assistance of another to leave the home; or 2) patient's condition makes leaving the home medically contraindicated; and 3) patient has a normal inability to leave the home and leaving the home requires considerable and taxing effort. Patient may leave the home for infrequent and short duration for medical reasons, and occasional absences for non-medical reasons. Homebound status is due to the following functional limitations: Patient's ambulation limited secondary to severe pain and requires the use of an assistive device and the assistance of a caregiver for safe completion. Patient with strength and ROM deficits limiting ambulation endurance requiring the use of an assistive device and the assistance of a caregiver. Patient deemed temporarily homebound secondary to increased risk for infection when leaving home and going out into the community. 3. I certify that this patient is under my care and that I, or a nurse practitioner or  [id number], or clinical nurse specialist, or certified nurse midwife, working with me, had a Face-to-Face Encounter that meets the physician Face-to-Face Encounter requirements.   The following are the clinical findings from the [address] [place] [address] encounter that support the need for skilled services and is a summary of the encounter: See hospital chart        Richelle Abdi, BSW  2/7/2020      THE FOLLOWING TO BE COMPLETED BY THE COMMUNITY PHYSICIAN:    I concur with the findings described above from the F2F encounter that this patient is homebound and in need of a skilled service.     Certifying Physician: _____________________________________      Printed Certifying Physician Name: _____________________________________    Date: _________________

## 2020-02-07 NOTE — PROGRESS NOTES
Pt is alert and oriented. In bed. Family member at bedside. Able to dorsi/plantar flex. JOE Millers in place. IV, dressing clean, dry and intact. Oxycodone given for report of pain. Call light, IS at bedside. Pt also has own C-pap at bedside. Will continue to monitor.

## 2020-02-07 NOTE — PROGRESS NOTES
Problem: Mobility Impaired (Adult and Pediatric)  Goal: *Acute Goals and Plan of Care (Insert Text)  Description  GOALS (1-4 days):  (1.)Ms. Abdias Quezada will move from supine to sit and sit to supine  in bed with SUPERVISION. (2.)Ms. Abdias Quezada will transfer from bed to chair and chair to bed with STAND BY ASSIST using the least restrictive device. (3.)Ms. Abdias Quezada will ambulate with STAND BY ASSIST for 250 feet with the least restrictive device. (4.)Ms. Abdias Quezada will increase right knee ROM to 5°-80°.  ________________________________________________________________________________________________     Outcome: Progressing Towards Goal     PHYSICAL THERAPY JOINT CAMP TKA: Initial Assessment, Treatment Day: Day of Assessment, and AM 2/7/2020  INPATIENT: Hospital Day: 2  [place] / [place] / Product Type: Medicare /      NAME/AGE/GENDER: Kati Barbosa is a 81 y.o. female   PRIMARY DIAGNOSIS:  Unilateral primary osteoarthritis, right knee [M17.11]   Procedure(s) and Anesthesia Type:     * KNEE ARTHROPLASTY TOTAL/ RIGHT/ - Spinal (Right)  ICD-10: Treatment Diagnosis:    Pain in Right Knee (M25.561)  Stiffness of Right Knee, Not elsewhere classified (M25.661)  Difficulty in walking, Not elsewhere classified (R26.2)      ASSESSMENT:     Ms. Abdias Quezada presents supine on contact and agreeable to therapy. She present with decreased strength and range of motion right lower extremity s/p TKA. Pt will benefit from skilled PT interventions to maximize independence with functional mobility. She had her other knee done in May of 2019 and says it went well. Says she already had pain medicine. Worked on TKA exercises in supine with verbal cues. Then bed mobility and walked in and out of bathroom. Verbal cues for gait with rolling walker. Pt stayed up in chair with ice on knee and needs in reach. Hope to progress at next therapy session.      This section established at most recent assessment   PROBLEM LIST (Impairments causing functional limitations):  Decreased Strength  Decreased ADL/Functional Activities  Decreased Transfer Abilities  Decreased Ambulation Ability/Technique  Increased Pain  Decreased Flexibility/Joint Mobility  Edema/Girth  Decreased [place] with Home Exercise Program   INTERVENTIONS PLANNED: (Benefits and precautions of physical therapy have been discussed with the patient.)  Cold  bed mobility  gait training  home exercise program (HEP)  Range of Motion: active/assisted/passive  Therapeutic Activities  therapeutic exercise/strengthening  transfer training  Group Therapy     TREATMENT PLAN: Frequency/Duration: Follow patient BID for duration of hospital stay to address above goals. Rehabilitation Potential For Stated Goals: Good     RECOMMENDED REHABILITATION/EQUIPMENT: (at time of discharge pending progress): Continue Skilled Therapy, Home Health: Physical Therapy, and Outpatient: Physical Therapy. HISTORY:   History of Present Injury/Illness (Reason for Referral):  Pt s/p right TKA on 2/6/2020  Past Medical History/Comorbidities:   Ms. Carlos Card  has a past medical history of Breast cancer ([address] (2007), DJD (degenerative joint disease) (04/30/2013), Essential hypertension, benign (04/30/2013), Generalized osteoarthrosis, involving multiple sites (4/30/2013), Hand eczema (4/30/2013), Heart murmur, Mixed hyperlipidemia (04/30/2013), Morbid obesity ([address] (04/30/2013), Routine general medical examination at a health care facility (4/30/2013), Type II or unspecified type diabetes mellitus without mention of complication, not stated as uncontrolled (04/30/2013), and Unspecified sleep apnea (04/30/2013).  She also has no past medical history of Adverse effect of anesthesia, Aneurysm ([address] Arrhythmia, Asthma, Autoimmune disease ([address] CAD (coronary artery disease), Chronic kidney disease, Chronic obstructive pulmonary disease ([address] Chronic pain, Coagulation disorder ([address] Difficult intubation, Endocarditis, GERD (gastroesophageal reflux disease), Heart failure ([address] Ill-defined condition, Liver disease, Malignant hyperthermia due to anesthesia, Nausea & vomiting, Nicotine vapor product user, Non-nicotine vapor product user, Pseudocholinesterase deficiency, Psychiatric disorder, PUD (peptic ulcer disease), Rheumatic fever, Seizures ([address] Stroke ([address] Thromboembolus ([address] or Thyroid disease. Ms. Enrike Dunn  has a past surgical history that includes pr breast surgery procedure unlisted (08/2010); hx cholecystectomy (1981); hx tonsillectomy (~1950); hx tubal ligation (1984); hx breast lumpectomy (Right, 2007); and hx knee replacement (Left, 05/2019). Social History/Living Environment:   Home Environment: Private residence  # Steps to Enter: 4  Wheelchair Ramp: Yes  One/Two Story Residence: One story  Living Alone: No  Support Systems: Spouse/Significant Other/Partner  Patient Expects to be Discharged to[de-identified] Private residence  Current DME Used/Available at Home: Raised toilet seat, Walker, rolling  Tub or Shower Type: Tub/Shower combination  Prior Level of Function/Work/Activity:  Pt living at home, was using a walker for mobility   Number of Personal Factors/Comorbidities that affect the Plan of Care: 0: LOW COMPLEXITY   EXAMINATION:   Most Recent Physical Functioning:   Gross Assessment: Yes  Gross Assessment  AROM: Within functional limits(except right lower extremity s/p TKA)  Strength: Within functional limits(except right lower extremity s/p TKA)                     Bed Mobility  Supine to Sit: Stand-by assistance    Transfers  Sit to Stand: Contact guard assistance  Stand to Sit: Contact guard assistance    Balance  Sitting: Intact  Standing: Pull to stand; With support    Posture  Posture (WDL): Within defined limits         Weight Bearing Status  Right Side Weight Bearing: As tolerated  Distance (ft): 50 Feet (ft)  Ambulation - Level of Assistance: Stand-by assistance;Contact guard assistance  Assistive Device: Walker, rolling  Speed/Radha: Pace decreased (<100 feet/min)  Step Length: Left shortened  Stance: Right decreased  Gait Abnormalities: Antalgic  Interventions: Safety awareness training;Verbal cues     Braces/Orthotics: none    Right Knee Cold  Type: Cryocuff      Body Structures Involved:  Joints  Muscles Body Functions Affected: Movement Related Activities and Participation Affected: Mobility  Self Care   Number of elements that affect the Plan of Care: 4+: HIGH COMPLEXITY   CLINICAL PRESENTATION:   Presentation: Stable and uncomplicated: LOW COMPLEXITY   CLINICAL DECISION MAKIN[address] [place] [address] AM-PAC 6 Clicks   Basic Mobility Inpatient Short Form  How much difficulty does the patient currently have. .. Unable A Lot A Little None   1. Turning over in bed (including adjusting bedclothes, sheets and blankets)? [] 1   [] 2   [x] 3   [] 4   2. Sitting down on and standing up from a chair with arms ( e.g., wheelchair, bedside commode, etc.)   [] 1   [] 2   [x] 3   [] 4   3. Moving from lying on back to sitting on the side of the bed? [] 1   [] 2   [x] 3   [] 4   How much help from another person does the patient currently need. .. Total A Lot A Little None   4. Moving to and from a bed to a chair (including a wheelchair)? [] 1   [] 2   [x] 3   [] 4   5. Need to walk in hospital room? [] 1   [] 2   [x] 3   [] 4   6. Climbing 3-5 steps with a railing? [] 1   [] 2   [x] 3   [] 4   © , Trustees of [address] [place] [address], under license to [employer]. All rights reserved     Score:  Initial: 18 Most Recent: X (Date: -- )    Interpretation of Tool:  Represents activities that are increasingly more difficult (i.e. Bed mobility, Transfers, Gait). Medical Necessity:     Patient is expected to demonstrate progress in   strength, range of motion, and functional technique   to   decrease assistance required with functional mobility and TKA management   .   Reason for Services/Other Comments:  Patient continues to require skilled intervention due to   Inability to complete functional mobility and TKA management independently   . Use of outcome tool(s) and clinical judgement create a POC that gives a: Clear prediction of patient's progress: LOW COMPLEXITY            TREATMENT:   (In addition to Assessment/Re-Assessment sessions the following treatments were rendered)     Pre-treatment Symptoms/Complaints:  none  Pain Initial:   Pain Intensity 1: (sore after therapy)  Post Session: sore after therapy     Therapeutic Exercise: (10 Minutes):  Exercises per grid below to improve mobility and strength. Required minimal verbal and manual cues to promote proper body alignment and promote proper body posture. Progressed repetitions as indicated. Date:  2/7/2020 Date:   Date:     ACTIVITY/EXERCISE AM PM AM PM AM PM   GROUP THERAPY  []  []  []  []  []  []   Ankle Pumps 10        Quad Sets 10        Gluteal Sets 10        Hip ABd/ADduction 10        Straight Leg Raises 10        Knee Slides 10        Short Arc Quads         Long Arc Quads         Chair Slides                  B = bilateral; AA = active assistive; A = active; P = passive      Treatment/Session Assessment:     Response to Treatment:  pt tolerated well. Education:  [x] Home Exercises  [] Fall Precautions  [x] No pillow under knee [] D/C Instruction Review  [] Knee Prosthesis Review  [] Walker Management/Safety [] Adaptive Equipment as Needed       Interdisciplinary Collaboration:   Registered Nurse    After treatment position/precautions:   Up in chair  Bed/Chair-wheels locked  Call light within reach    Compliance with Program/Exercises: Compliant all of the time, Will assess as treatment progresses. Recommendations/Intent for next treatment session:  Treatment next visit will focus on increasing Ms.  Tyrell's independence with bed mobility, transfers, gait training, strength/ROM exercises, modalities for pain, and patient education.       Total Treatment Duration:  PT Patient Time In/Time Out  Time In: 0923  Time Out: SLADE Guzmán

## 2020-02-07 NOTE — PROGRESS NOTES
Pt alert and oriented. Resting in bed. Daughter in law at bedside. Pt's BS was 397 this pm. Dr Jair Chaudhari notified. Order obtained to give 5units humalog one time in addition to 10 units per sliding scale for total of 15 units. !5 units SQ given.  Pt was also given scheduled 35 units Humalin N SQ.

## 2020-02-07 NOTE — PROGRESS NOTES
Care Management Interventions  PCP Verified by CM: Yes  Mode of Transport at Discharge: Self  Transition of Care Consult (CM Consult): [address]: Yes  Discharge Durable Medical Equipment: No  Physical Therapy Consult: Yes  Occupational Therapy Consult: Yes  Current Support Network: Lives with Spouse  Confirm Follow Up Transport: Family  The Plan for Transition of Care is Related to the Following Treatment Goals : return to independent function  The Patient and/or Patient Representative was Provided with a Choice of Provider and Agrees with the Discharge Plan?: Yes  Freedom of Choice List was Provided with Basic Dialogue that Supports the Patient's Individualized Plan of Care/Goals, Treatment Preferences and Shares the Quality Data Associated with the Providers?: Yes  Discharge Location  Discharge Placement: Home with home health    Patient is a 81y.o. year old female admitted for Right TKA . Patient plans to return home on discharge. Order received to arrange home health. [facility]. Referral sent to [facility]. Patient denies any equipment needs as patient has a walker . Will follow until discharge.

## 2020-02-07 NOTE — PROGRESS NOTES
Post op interview conducted following KNEE ARTHROPLASTY TOTAL/ RIGHT/:  dated 2/6/20.  No anesthetic complications noted

## 2020-02-07 NOTE — PROGRESS NOTES
Problem: Self Care Deficits Care Plan (Adult)  Goal: *Acute Goals and Plan of Care (Insert Text)  Description  GOALS:   DISCHARGE GOALS (in preparation for going home/rehab):  3 days  1. Ms. Zoey Daniels will perform one lower body dressing activity with minimal assistance required to demonstrate improved functional mobility and safety. 2.  Ms. Zoey Daniels will perform one lower body bathing activity with minimal assistance required to demonstrate improved functional mobility and safety. 3.  Ms. Zoey Daniels will perform toileting/toilet transfer with contact guard assistance to demonstrate improved functional mobility and safety. 4.  Ms. Zoey Daniels will perform shower transfer with contact guard assistance to demonstrate improved functional mobility and safety. Outcome: Resolved/Met     JOINT CAMP OCCUPATIONAL THERAPY TKA: Initial Assessment, Daily Note, Discharge, Treatment Day: Day of Assessment, and AM 2/7/2020  INPATIENT: Hospital Day: 2  [place] / [place] / Product Type: Medicare /      NAME/AGE/GENDER: Adán Jovel is a 81 y.o. female   PRIMARY DIAGNOSIS:  Unilateral primary osteoarthritis, right knee [M17.11]   Procedure(s) and Anesthesia Type:     * KNEE ARTHROPLASTY TOTAL/ RIGHT/ - Spinal (Right)  ICD-10: Treatment Diagnosis:    Pain in Right Knee (M25.561)  Stiffness of Right Knee, Not elsewhere classified (Q50.771)      ASSESSMENT:      Ms. Zoey Daniels is s/p right TKA and presents with decreased weight bearing on right LE and decreased independence with functional mobility and activities of daily living. Patient completed shower and dressing as charter below in ADL grid and is ambulating with rolling walker and stand by assist.  Patient has met 4/4 goals and plans to return home with good family support. Family able to provide patient with appropriate level of assistance at this time. OT reviewed safety precautions throughout session and therapy schedule for the remainder of today. Patient instructed to call for assistance when needing to get up from recliner and all needs in reach. Patient verbalized understanding of call light. This section established at most recent assessment   PROBLEM LIST (Impairments causing functional limitations):  Decreased Strength  Decreased ADL/Functional Activities  Decreased Transfer Abilities  Increased Pain  Increased Fatigue  Decreased Flexibility/Joint Mobility  Decreased Knowledge of Precautions   INTERVENTIONS PLANNED: (Benefits and precautions of occupational therapy have been discussed with the patient.)  Activities of daily living training  Adaptive equipment training  Balance training  Clothing management  Donning&doffing training  Theraputic activity     TREATMENT PLAN: Frequency/Duration: Follow patient 1-2 times to address above goals. Rehabilitation Potential For Stated Goals: Good     RECOMMENDED REHABILITATION/EQUIPMENT: (at time of discharge pending progress): Continue Skilled Therapy. OCCUPATIONAL PROFILE AND HISTORY:   History of Present Injury/Illness (Reason for Referral): Pt presents this date s/p (right) TKA. Past Medical History/Comorbidities:   Ms. Bijal Muñiz  has a past medical history of Breast cancer ([address] (2007), DJD (degenerative joint disease) (04/30/2013), Essential hypertension, benign (04/30/2013), Generalized osteoarthrosis, involving multiple sites (4/30/2013), Hand eczema (4/30/2013), Heart murmur, Mixed hyperlipidemia (04/30/2013), Morbid obesity ([address] (04/30/2013), Routine general medical examination at a health care facility (4/30/2013), Type II or unspecified type diabetes mellitus without mention of complication, not stated as uncontrolled (04/30/2013), and Unspecified sleep apnea (04/30/2013).  She also has no past medical history of Adverse effect of anesthesia, Aneurysm ([address] Arrhythmia, Asthma, Autoimmune disease ([address] CAD (coronary artery disease), Chronic kidney disease, Chronic obstructive pulmonary disease ([facility] Chronic pain, Coagulation disorder ([address] Difficult intubation, Endocarditis, GERD (gastroesophageal reflux disease), Heart failure ([address] Ill-defined condition, Liver disease, Malignant hyperthermia due to anesthesia, Nausea & vomiting, Nicotine vapor product user, Non-nicotine vapor product user, Pseudocholinesterase deficiency, Psychiatric disorder, PUD (peptic ulcer disease), Rheumatic fever, Seizures ([address] Stroke ([facility] [facility] Thromboembolus ([facility] [address] or Thyroid disease. Ms. Nanda Heimlich  has a past surgical history that includes pr breast surgery procedure unlisted (08/2010); hx cholecystectomy (1981); hx tonsillectomy (~1950); hx tubal ligation (1984); hx breast lumpectomy (Right, 2007); and hx knee replacement (Left, 05/2019). Social History/Living Environment:   Home Environment: Private residence  # Steps to Enter: 4  Wheelchair Ramp: Yes  One/Two Story Residence: One story  Living Alone: No  Support Systems: Spouse/Significant Other/Partner  Patient Expects to be Discharged to[de-identified] Private residence  Current DME Used/Available at Home: Raised toilet seat, Walker, rolling  Tub or Shower Type: Tub/Shower combination  Prior Level of Function/Work/Activity:  Independent      Number of Personal Factors/Comorbidities that affect the Plan of Care: Brief history (0):  LOW COMPLEXITY   ASSESSMENT OF OCCUPATIONAL PERFORMANCE[de-identified]   Most Recent Physical Functioning:   Balance  Sitting: Intact  Standing: Intact; With support       Gross Assessment: Yes  Gross Assessment  AROM: Within functional limits(except right lower extremity s/p TKA)  Strength:  Within functional limits(except right lower extremity s/p TKA)                      Mental Status  Neurologic State: Alert  Orientation Level: Oriented X4  Perception: Appears intact  Perseveration: No perseveration noted  Safety/Judgement: Awareness of environment                Basic ADLs (From Assessment) Complex ADLs (From Assessment)   Basic ADL  Type of Bath: Chlorhexidine (CHG), Full, Shower     Grooming/Bathing/Dressing Activities of Daily Living   Grooming  Grooming Assistance: Supervision(standing at sink) Cognitive Retraining  Safety/Judgement: Awareness of environment   Upper Body Bathing  Bathing Assistance: Supervision     Lower Body Bathing  Bathing Assistance: Supervision(seated on shower chair) [address]: Supervision  Cues: Verbal cues provided  Adaptive Equipment: Elevated seat;Grab bars; Connee Simmering   Upper Body Dressing Assistance  Dressing Assistance: Supervision Functional Transfers  Bathroom Mobility: Supervision/set up  Toilet Transfer : Supervision  Shower Transfer: Supervision  Cues: Verbal cues provided  Adaptive Equipment: Shower chair with back;Grab bars; Walker (comment)   Lower Body Dressing Assistance  Dressing Assistance: Stand-by assistance(with verbal cues) Bed/Mat Mobility  Supine to Sit: Stand-by assistance  Sit to Stand: Contact guard assistance  Stand to Sit: Contact guard assistance         Physical Skills Involved:  Range of Motion  Balance  Strength Cognitive Skills Affected (resulting in the inability to perform in a timely and safe manner):  none  Psychosocial Skills Affected:  Environmental Adaptation   Number of elements that affect the Plan of Care: 3-5:  MODERATE COMPLEXITY   CLINICAL DECISION MAKIN[address] [place] [address] [address] [id number] AM-PAC 6 Clicks   Daily Activity Inpatient Short Form  How much help from another person does the patient currently need. .. Total A Lot A Little None   1. Putting on and taking off regular lower body clothing? [] 1   [] 2   [] 3   [x] 4   2. Bathing (including washing, rinsing, drying)? [] 1   [] 2   [] 3   [x] 4   3. Toileting, which includes using toilet, bedpan or urinal?   [] 1   [] 2   [] 3   [x] 4   4. Putting on and taking off regular upper body clothing? [] 1   [] 2   [] 3   [x] 4   5. Taking care of personal grooming such as brushing teeth? [] 1   [] 2   [] 3   [x] 4   6. Eating meals? [] 1   [] 2   [] 3   [x] 4   © 2007, Trustees of [address] [place] [address] [address] [id number], under license to [employer]. All rights reserved     Score:  Initial: 24 Most Recent: X (Date: -- )    Interpretation of Tool:  Represents activities that are increasingly more difficult (i.e. Bed mobility, Transfers, Gait). Use of outcome tool(s) and clinical judgement create a POC that gives a: LOW COMPLEXITY            TREATMENT:   (In addition to Assessment/Re-Assessment sessions the following treatments were rendered)     Pre-treatment Symptoms/Complaints:  0  Pain: Initial:     0 Post Session:  0     Self Care: (40 min): Procedure(s) (per grid) utilized to improve and/or restore self-care/home management as related to dressing, bathing, toileting, and grooming. Required minimal verbal and   cueing to facilitate activities of daily living skills and compensatory activities. OT evaluation completed   Treatment/Session Assessment:     Response to Treatment:  pt up to shower tolerated well. Education:  [] Home Exercises  [x] Fall Precautions  [] Hip Precautions [] Going Home Video  [x] Knee/Hip Prosthesis Review  [x] Walker Management/Safety [x] Adaptive Equipment as Needed       Interdisciplinary Collaboration:   Physical Therapist  Occupational Therapist  Registered Nurse    After treatment position/precautions:   Up in chair  Bed/Chair-wheels locked  Caregiver at bedside  Call light within reach  RN notified     Compliance with Program/Exercises: Compliant all of the time. Recommendations/Intent for next treatment session:  Pt doing well all goals met and will do well at home with support from . Patient will be discharged home with home health PT. No further Occupational Therapy warranted, will discharge Occupational Therapy services.         Total Treatment Duration:  OT Patient Time In/Time Out  Time In: 0945  Time Out: [address] [facility],[address], OT

## 2020-02-09 ENCOUNTER — HOME CARE VISIT (OUTPATIENT)
Dept: SCHEDULING | Facility: HOME HEALTH | Age: 70
End: 2020-02-09
Payer: MEDICARE

## 2020-02-09 VITALS
TEMPERATURE: 97.9 F | DIASTOLIC BLOOD PRESSURE: 76 MMHG | SYSTOLIC BLOOD PRESSURE: 132 MMHG | RESPIRATION RATE: 18 BRPM | HEART RATE: 78 BPM

## 2020-02-09 PROCEDURE — G0151 HHCP-SERV OF PT,EA 15 MIN: HCPCS

## 2020-02-09 PROCEDURE — 3331090001 HH PPS REVENUE CREDIT

## 2020-02-09 PROCEDURE — 400013 HH SOC

## 2020-02-09 PROCEDURE — 3331090002 HH PPS REVENUE DEBIT

## 2020-02-10 ENCOUNTER — PATIENT OUTREACH (OUTPATIENT)
Dept: CASE MANAGEMENT | Age: 70
End: 2020-02-10

## 2020-02-10 PROCEDURE — 3331090002 HH PPS REVENUE DEBIT

## 2020-02-10 PROCEDURE — 3331090001 HH PPS REVENUE CREDIT

## 2020-02-10 NOTE — PROGRESS NOTES
This note will not be viewable in [address] Transition of Care Discharge Follow-up Questionnaire   Date/Time of Call:    02/10/2020 11:05 am    What was the patient hospitalized for? S/P TKR ( Total right knee replacement)    Does the patient understand his/her diagnosis and/or treatment and what happened during the hospitalization? Spoke with patient and she states that she has an understanding of her recent hospitalization, diagnosis and treatment. Patient states she is doing well since her discharge home. .    Did the patient receive discharge instructions? Yes    CM Assessed Risk for Readmission:         Patient stated Risk for Readmission:        Low risk for readmission. Patient did not voice any concerns regarding readmission. Review any discharge instructions (see discharge instructions/AVS in [facility]). Ask patient if they understand these. Do they have any questions? Discharge instructions reviewed with patient and she verbalized understanding of discharge instructions given. Were home services ordered (nursing, PT, OT, ST, etc.)? Yes   If so, has the first visit occurred? If not, why? (Assist with coordination of services if necessary.)    Yes    Was any DME ordered? No- Walker at home for use. If so, has it been received? If not, why?  (Assist patient in obtaining DME orders &/or equipment if necessary.) N/A   Completed review of all medications (new, discontinued, and continued meds per the d/c instructions and medication tab in [facility]) START taking:  oxyCODONE IR 5 mg immediate release tablet (ROXICODONE)  CHANGE how you take:  aspirin delayed-release 81 mg tablet  Change dose to 81 mg 1 BID   RESUME ALL PREVIOUS MEDICATIONS    Were all new prescriptions filled?   If not, why?  (Assist patient in obtaining medications if necessary  escalate for CCM &/or SW if ongoing issues are verbalized by pt or anticipated)    Yes    Does the patient understand the purpose and dosing instructions for all medications? (If patient has questions, provide explanation and education.)    Medications reviewed with patient and she verbalized an understanding of medications. Does the patient have any problems in performing ADLs? (If patient is unable to perform ADLs  what is the limiting factor(s)? Do they have a support system that can assist? If no support system is present, discuss possible assistance that they may be able to obtain. Escalate for CCM/SW if ongoing issues are verbalized by pt or anticipated)    Patient lives with    and independent with ADLs.  available for assistance as needed. Does the patient have all follow-up appointments scheduled? 7 day f/up with PCP?   (f/up with PCP may be w/in 14 days if patient has a f/up with their specialist w/in 7 days)     7-14 day f/up with specialist?   (or per discharge instructions)     If f/up has not been made  what actions has the care coordinator made to accomplish this? Has transportation been arranged? FU with Dr. Lauren Rosales scheduled in 3/2020. Patient unsure of the date and time. FU with Danny Mirza NP [facility]               Discussed the importance of FU appointments with patient and she verbalized understanding. No transportation needs at this time. Any other questions or concerns expressed by the patient? No verbalized  questions or concerns. Schedule next appointment with JOSH Evans or refer to RN Case Manager/ per the workflow guidelines. When is care coordinators next follow-up call scheduled? If referred for CCM  what RN care manager       Was the referral assigned? Contact information for Care Coordinator given to patient if needed before next scheduled outreach.           7-14 days            OZZY Call Completed By: Isael Campos LPN Care Coordinator

## 2020-02-11 PROCEDURE — 3331090001 HH PPS REVENUE CREDIT

## 2020-02-11 PROCEDURE — 3331090002 HH PPS REVENUE DEBIT

## 2020-02-12 ENCOUNTER — HOME CARE VISIT (OUTPATIENT)
Dept: SCHEDULING | Facility: HOME HEALTH | Age: 70
End: 2020-02-12
Payer: MEDICARE

## 2020-02-12 VITALS
DIASTOLIC BLOOD PRESSURE: 68 MMHG | RESPIRATION RATE: 18 BRPM | TEMPERATURE: 98.3 F | HEART RATE: 88 BPM | SYSTOLIC BLOOD PRESSURE: 138 MMHG

## 2020-02-12 PROCEDURE — 3331090001 HH PPS REVENUE CREDIT

## 2020-02-12 PROCEDURE — G0157 HHC PT ASSISTANT EA 15: HCPCS

## 2020-02-12 PROCEDURE — 3331090002 HH PPS REVENUE DEBIT

## 2020-02-13 PROCEDURE — 3331090001 HH PPS REVENUE CREDIT

## 2020-02-13 PROCEDURE — 3331090002 HH PPS REVENUE DEBIT

## 2020-02-14 ENCOUNTER — HOME CARE VISIT (OUTPATIENT)
Dept: SCHEDULING | Facility: HOME HEALTH | Age: 70
End: 2020-02-14
Payer: MEDICARE

## 2020-02-14 VITALS
RESPIRATION RATE: 18 BRPM | SYSTOLIC BLOOD PRESSURE: 128 MMHG | TEMPERATURE: 98 F | HEART RATE: 78 BPM | DIASTOLIC BLOOD PRESSURE: 68 MMHG

## 2020-02-14 PROCEDURE — 3331090002 HH PPS REVENUE DEBIT

## 2020-02-14 PROCEDURE — 3331090001 HH PPS REVENUE CREDIT

## 2020-02-14 PROCEDURE — G0157 HHC PT ASSISTANT EA 15: HCPCS

## 2020-02-15 PROCEDURE — 3331090002 HH PPS REVENUE DEBIT

## 2020-02-15 PROCEDURE — 3331090001 HH PPS REVENUE CREDIT

## 2020-02-16 PROCEDURE — 3331090002 HH PPS REVENUE DEBIT

## 2020-02-16 PROCEDURE — 3331090001 HH PPS REVENUE CREDIT

## 2020-02-17 ENCOUNTER — HOME CARE VISIT (OUTPATIENT)
Dept: SCHEDULING | Facility: HOME HEALTH | Age: 70
End: 2020-02-17
Payer: MEDICARE

## 2020-02-17 VITALS
SYSTOLIC BLOOD PRESSURE: 138 MMHG | RESPIRATION RATE: 18 BRPM | TEMPERATURE: 98.4 F | HEART RATE: 96 BPM | DIASTOLIC BLOOD PRESSURE: 70 MMHG

## 2020-02-17 PROCEDURE — 3331090001 HH PPS REVENUE CREDIT

## 2020-02-17 PROCEDURE — A4649 SURGICAL SUPPLIES: HCPCS

## 2020-02-17 PROCEDURE — 3331090002 HH PPS REVENUE DEBIT

## 2020-02-17 PROCEDURE — G0157 HHC PT ASSISTANT EA 15: HCPCS

## 2020-02-18 ENCOUNTER — HOME CARE VISIT (OUTPATIENT)
Dept: SCHEDULING | Facility: HOME HEALTH | Age: 70
End: 2020-02-18
Payer: MEDICARE

## 2020-02-18 VITALS
SYSTOLIC BLOOD PRESSURE: 138 MMHG | TEMPERATURE: 98.4 F | RESPIRATION RATE: 18 BRPM | DIASTOLIC BLOOD PRESSURE: 68 MMHG | HEART RATE: 78 BPM

## 2020-02-18 PROCEDURE — G0157 HHC PT ASSISTANT EA 15: HCPCS

## 2020-02-18 PROCEDURE — 3331090001 HH PPS REVENUE CREDIT

## 2020-02-18 PROCEDURE — 3331090002 HH PPS REVENUE DEBIT

## 2020-02-19 PROCEDURE — 3331090001 HH PPS REVENUE CREDIT

## 2020-02-19 PROCEDURE — 3331090002 HH PPS REVENUE DEBIT

## 2020-02-20 ENCOUNTER — HOME CARE VISIT (OUTPATIENT)
Dept: SCHEDULING | Facility: HOME HEALTH | Age: 70
End: 2020-02-20
Payer: MEDICARE

## 2020-02-20 ENCOUNTER — PATIENT OUTREACH (OUTPATIENT)
Dept: CASE MANAGEMENT | Age: 70
End: 2020-02-20

## 2020-02-20 VITALS
HEART RATE: 88 BPM | SYSTOLIC BLOOD PRESSURE: 142 MMHG | DIASTOLIC BLOOD PRESSURE: 70 MMHG | TEMPERATURE: 98.3 F | RESPIRATION RATE: 18 BRPM

## 2020-02-20 PROCEDURE — 3331090002 HH PPS REVENUE DEBIT

## 2020-02-20 PROCEDURE — 3331090001 HH PPS REVENUE CREDIT

## 2020-02-20 PROCEDURE — G0157 HHC PT ASSISTANT EA 15: HCPCS

## 2020-02-20 NOTE — PROGRESS NOTES
This note will not be viewable in [address] Transitions of Care  Follow up Outreach Note   Outreach type Phone call: Spoke with patient  Home visit:   Date/Time of Outreach: 2/20/2020   10:25 am      Has patient attended PCP or specialist follow-up appointments since last contact? What was outcome of appointment? When is next follow-up scheduled? FU appointment scheduled with Dr. Juan Hearn for 3/9/2020  FU with PCP PRN    Review medications. Any medication changes since last outreach? Does patient have any questions or issues related to their medications? Medications reviewed with patient with no new medications added to medication regimen. Home health active? If yes  any issue? Progress? Yes PT      Referrals needed?  (CM, SW, HH, etc. )   No   Other issues/Miscellaneous? (Transportation, access to meals, ability to perform ADLs, adequate caregiver support, etc.) Patient independent with ADLs. Assistance with ADLs by  as needed. Next Outreach Scheduled? Graduation from program?   15-30 days      Next Steps/Goals (if applicable):         Outreach completed by:   Yohan Rodriguez LPN Care Coordinator

## 2020-02-21 PROCEDURE — 3331090002 HH PPS REVENUE DEBIT

## 2020-02-21 PROCEDURE — 3331090001 HH PPS REVENUE CREDIT

## 2020-02-22 PROCEDURE — 3331090001 HH PPS REVENUE CREDIT

## 2020-02-22 PROCEDURE — 3331090002 HH PPS REVENUE DEBIT

## 2020-02-23 PROCEDURE — 3331090001 HH PPS REVENUE CREDIT

## 2020-02-23 PROCEDURE — 3331090002 HH PPS REVENUE DEBIT

## 2020-02-24 ENCOUNTER — HOME CARE VISIT (OUTPATIENT)
Dept: HOME HEALTH SERVICES | Facility: HOME HEALTH | Age: 70
End: 2020-02-24
Payer: MEDICARE

## 2020-02-24 ENCOUNTER — HOME CARE VISIT (OUTPATIENT)
Dept: SCHEDULING | Facility: HOME HEALTH | Age: 70
End: 2020-02-24
Payer: MEDICARE

## 2020-02-24 VITALS
TEMPERATURE: 98 F | HEART RATE: 86 BPM | RESPIRATION RATE: 18 BRPM | SYSTOLIC BLOOD PRESSURE: 128 MMHG | DIASTOLIC BLOOD PRESSURE: 70 MMHG

## 2020-02-24 PROCEDURE — 3331090002 HH PPS REVENUE DEBIT

## 2020-02-24 PROCEDURE — 3331090001 HH PPS REVENUE CREDIT

## 2020-02-24 PROCEDURE — G0157 HHC PT ASSISTANT EA 15: HCPCS

## 2020-02-25 PROCEDURE — 3331090001 HH PPS REVENUE CREDIT

## 2020-02-25 PROCEDURE — 3331090002 HH PPS REVENUE DEBIT

## 2020-02-26 ENCOUNTER — HOME CARE VISIT (OUTPATIENT)
Dept: SCHEDULING | Facility: HOME HEALTH | Age: 70
End: 2020-02-26
Payer: MEDICARE

## 2020-02-26 VITALS
HEART RATE: 78 BPM | RESPIRATION RATE: 18 BRPM | DIASTOLIC BLOOD PRESSURE: 66 MMHG | SYSTOLIC BLOOD PRESSURE: 136 MMHG | TEMPERATURE: 98.4 F

## 2020-02-26 PROCEDURE — 3331090001 HH PPS REVENUE CREDIT

## 2020-02-26 PROCEDURE — G0157 HHC PT ASSISTANT EA 15: HCPCS

## 2020-02-26 PROCEDURE — 3331090002 HH PPS REVENUE DEBIT

## 2020-02-27 PROCEDURE — 3331090002 HH PPS REVENUE DEBIT

## 2020-02-27 PROCEDURE — 3331090001 HH PPS REVENUE CREDIT

## 2020-02-28 PROCEDURE — 3331090001 HH PPS REVENUE CREDIT

## 2020-02-28 PROCEDURE — 3331090002 HH PPS REVENUE DEBIT

## 2020-02-29 PROCEDURE — 3331090001 HH PPS REVENUE CREDIT

## 2020-02-29 PROCEDURE — 3331090002 HH PPS REVENUE DEBIT

## 2020-03-01 PROCEDURE — 3331090002 HH PPS REVENUE DEBIT

## 2020-03-01 PROCEDURE — 3331090001 HH PPS REVENUE CREDIT

## 2020-03-02 PROCEDURE — 3331090002 HH PPS REVENUE DEBIT

## 2020-03-02 PROCEDURE — 3331090001 HH PPS REVENUE CREDIT

## 2020-03-03 ENCOUNTER — HOME CARE VISIT (OUTPATIENT)
Dept: SCHEDULING | Facility: HOME HEALTH | Age: 70
End: 2020-03-03
Payer: MEDICARE

## 2020-03-03 VITALS
HEART RATE: 82 BPM | DIASTOLIC BLOOD PRESSURE: 60 MMHG | RESPIRATION RATE: 16 BRPM | TEMPERATURE: 98.7 F | SYSTOLIC BLOOD PRESSURE: 148 MMHG

## 2020-03-03 PROCEDURE — G0151 HHCP-SERV OF PT,EA 15 MIN: HCPCS

## 2020-03-03 PROCEDURE — 3331090002 HH PPS REVENUE DEBIT

## 2020-03-03 PROCEDURE — 3331090001 HH PPS REVENUE CREDIT

## 2020-03-04 ENCOUNTER — HOSPITAL ENCOUNTER (OUTPATIENT)
Dept: PHYSICAL THERAPY | Age: 70
Discharge: HOME | End: 2020-03-04
Payer: MEDICARE

## 2020-03-04 PROCEDURE — 97161 PT EVAL LOW COMPLEX 20 MIN: CPT

## 2020-03-04 PROCEDURE — 97016 VASOPNEUMATIC DEVICE THERAPY: CPT

## 2020-03-04 PROCEDURE — 97110 THERAPEUTIC EXERCISES: CPT

## 2020-03-04 NOTE — THERAPY EVALUATION
Anuj Bass  : 1950 [address] [facility]  [address] [facility]  [phone]   [phone]        OUTPATIENT PHYSICAL THERAPY:Initial Assessment 3/4/2020         ICD-10: Treatment Diagnosis: Pain in right knee (M25.561) and Stiffness of right knee, not elsewhere classified (M25.661) and Muscle weakness (generalized) (M62.81) and Other abnormalities of gait and mobility (R26.89)  Precautions/Allergies:   Ambien [zolpidem]; Erythromycin; and Norvasc [amlodipine]   Fall Risk Score:     Ambulatory/Rehab Services H2 Model Falls Risk Assessment    Risk Factors:       No Risk Factors Identified Ability to Rise from Chair:       (1)  Pushes up, successful in one attempt    Falls Prevention Plan: Mobility Assistance Device (specify):  cane   Total: (5 or greater = High Risk): 1     [facility] [address] [place] [address] Patent #[id number]. Federal Law prohibits the replication, distribution or use without written permission from [facility] [employer]     MD Orders: eval and treat MEDICAL/REFERRING DIAGNOSIS:  Presence of right artificial knee joint [Z96.651]   DATE OF ONSET: 20  REFERRING PHYSICIAN: Erica Garcia MD  RETURN PHYSICIAN APPOINTMENT: 3/9/20     INITIAL ASSESSMENT:  Ms. Dee Butts presents with stiffness, weakness, pain, swelling following R TKA. She will benefit from skilled PT to address deficits to restore safe functional levels. PROBLEM LIST (Impacting functional limitations):  1. Decreased Strength  2. Decreased ADL/Functional Activities  3. Decreased Transfer Abilities  4. Decreased Ambulation Ability/Technique  5. Decreased Balance  6. Increased Pain  7. Decreased Activity Tolerance  8. Decreased Flexibility/Joint Mobility  9. Decreased [place] with Home Exercise Program INTERVENTIONS PLANNED:  1. Balance Exercise  2. Cryotherapy  3. Home Exercise Program (HEP)  4.  Manual Therapy  5. Therapeutic Activites  6. Therapeutic Exercise/Strengthening  7. aquatics    TREATMENT PLAN:  Effective Dates: 3/4/2020 TO 6/2/2020 (90 days). Frequency/Duration: 2-3 times a week for 90 Days  GOALS: (Goals have been discussed and agreed upon with patient.)  Short-Term Functional Goals: Time Frame: 4 weeks  1. Pt report compliance with HEP  2. Pt to demonstrate good quad set with full knee ext for normal gait mechanics  Discharge Goals: Time Frame: 12 weeks  1. Pt to restore 120 degrees flex R knee for normal activity  2. Pt to increase quad strength for sit to stand without UE support  3. Pt to increase hip strength for reciprocal gait on stairs  4. Pt to demonstrate SLS > 15 sec for safe amb all surfaces  Rehabilitation Potential For Stated Goals: Good  Regarding [facility] therapy, I certify that the treatment plan above will be carried out by a therapist or under their direction. Thank you for this referral,  Marisabel Merino PT       Referring Physician Signature: Carine Eid MD              Date                      HISTORY:   History of Present Injury/Illness (Reason for Referral):  Pt had R TKA 2/6/20. L TKA done 5/5/19. Knee is good in her opinion. She feels motion and strength are good. She is comfortable going without walker. She had HH until last week. She could not walk before surgery due to pain. She has no hobbies. She watches tv all day. Her goal is to walk on the beach for the first time in 20 years in April when her family goes to the beach.     Past Medical History/Comorbidities:   Ms. Yeni Gomez  has a past medical history of Breast cancer ([address] (2007), DJD (degenerative joint disease) (04/30/2013), Essential hypertension, benign (04/30/2013), Generalized osteoarthrosis, involving multiple sites (4/30/2013), Hand eczema (4/30/2013), Heart murmur, Mixed hyperlipidemia (04/30/2013), Morbid obesity ([address] (04/30/2013), Routine general medical examination at a [facility] facility (4/30/2013), Type II or unspecified type diabetes mellitus without mention of complication, not stated as uncontrolled (04/30/2013), and Unspecified sleep apnea (04/30/2013). She also has no past medical history of Adverse effect of anesthesia, Aneurysm ([address] Arrhythmia, Asthma, Autoimmune disease ([address] CAD (coronary artery disease), Chronic kidney disease, Chronic obstructive pulmonary disease ([address] Chronic pain, Coagulation disorder ([address] Difficult intubation, Endocarditis, GERD (gastroesophageal reflux disease), Heart failure ([address] Ill-defined condition, Liver disease, Malignant hyperthermia due to anesthesia, Nausea & vomiting, Nicotine vapor product user, Non-nicotine vapor product user, Pseudocholinesterase deficiency, Psychiatric disorder, PUD (peptic ulcer disease), Rheumatic fever, Seizures ([address] Stroke ([address] Thromboembolus ([address] or Thyroid disease. Ms. Rylee Bates  has a past surgical history that includes pr breast surgery procedure unlisted (08/2010); hx cholecystectomy (1981); hx tonsillectomy (~1950); hx tubal ligation (1984); hx breast lumpectomy (Right, 2007); and hx knee replacement (Left, 05/2019). Social History/Living Environment:     pt lives in single story home with spouse  Prior Level of Function/Work/Activity:  retired  Dominant Side:         RIGHT  Current Medications:    Current Outpatient Medications:     metFORMIN ER (GLUCOPHAGE XR) 500 mg tablet, Take 1 Tab by mouth Before breakfast and dinner. Indications: type 2 diabetes mellitus, Disp: 180 Tab, Rfl: 1    aspirin delayed-release 81 mg tablet, Take 1 Tab by mouth every twelve (12) hours every twelve (12) hours for 30 days. , Disp: 60 Tab, Rfl: 0    acetaminophen (TYLENOL) 325 mg tablet, Take  by mouth every four (4) hours as needed for Pain., Disp: , Rfl:     ergocalciferol (ERGOCALCIFEROL) 50,000 unit capsule, Take 1 Cap by mouth every seven (7) days.  (Patient taking differently: Take 50,000 Units by mouth every seven (7) days. Every Saturday), Disp: 12 Cap, Rfl: 0    dilTIAZem CD (CARDIZEM CD) 240 mg ER capsule, Take 1 Cap by mouth daily. , Disp: 90 Cap, Rfl: 1    rosuvastatin (CRESTOR) 10 mg tablet, Take 1 Tab by mouth nightly., Disp: 90 Tab, Rfl: 1    lisinopril-hydroCHLOROthiazide (PRINZIDE, ZESTORETIC) 20-12.5 mg per tablet, Take 1 Tab by mouth daily. Indications: high blood pressure, Disp: 90 Tab, Rfl: 1    insulin NPH (NOVOLIN N NPH U-100 INSULIN) 100 unit/mL injection, 30 Units by SubCUTAneous route Before breakfast and dinner. (Patient taking differently: 35 Units by SubCUTAneous route Before breakfast and dinner. Patient instructed to take 80% or 28 units of NPH insulin the day before surgery (both AM and PM dose). Patient instructed to take 1/2 or 17 units of NPH insulin on the day of surgery if glucose is greater than 120, if glucose is less than 120 then HOLD AM dose of NPH insulin.), Disp: 2 Vial, Rfl: 11    insulin regular (NOVOLIN R REGULAR U-100 INSULN) 100 unit/mL injection, 18 units before breakfast, 20 units before lunch and supper plus correction 1/20>150, max daily dose 60 units (Patient taking differently: 20 units before breakfast, 20 units before lunch and supper plus correction 1/20>150, max daily dose 60 units), Disp: 2 Vial, Rfl: 11    glucose blood VI test strips (ONETOUCH VERIO) strip, Check glucose 4 times daily, E11.65, Disp: 400 Strip, Rfl: 3    lancets (ONE TOUCH DELICA) 33 gauge misc, Check glucose 4 times daily, E11.65, Disp: 400 Lancet, Rfl: 3    Insulin Syringe-Needle U-100 0.5 mL 31 gauge x 5/16\" syrg, Use with insulin up to 5 times daily, E11.65, Disp: 500 Syringe, Rfl: 3    melatonin tab tablet, Take 5 mg by mouth nightly., Disp: , Rfl:    Date Last Reviewed:  3/4/2020   # of Personal Factors/Comorbidities that affect the Plan of Care: 0: LOW COMPLEXITY   EXAMINATION:   Observation/Orthostatic Postural Assessment:          Redness and swelling inf-lat R knee.   Pt keeps R hip ER to avoid knee ext. Palpation:          Hypersensitive around entire R knee. ROM:     AROM       Right  Knee flex-ext 85-12  DF 0 Left  102-15  0   Strength:     Fair quad set R  3/5 hip abd, hip ext B      Neurological Screen:        Unremarkable   Functional Mobility:         Gait/Ambulation:  Slight limp with rolling walker        Transfers:  Uses UE's, holds breath        Stairs:  Step-to pattern  Balance:          Unable to assess   Body Structures Involved:  1. Joints  2. Muscles Body Functions Affected:  1. Sensory/Pain  2. Movement Related Activities and Participation Affected:  1. General Tasks and Demands  2. Mobility  3. Self Care  4. Domestic Life  5. Community, Social and Count includes the [facility] [employer]    # of elements that affect the Plan of Care: 1-2: LOW COMPLEXITY   CLINICAL PRESENTATION:   Presentation: Stable and uncomplicated: LOW COMPLEXITY   CLINICAL DECISION MAKING:   Tool Used: Lower Extremity Functional Scale (LEFS)  Score:  Initial: 38/80 Most Recent: X/80 (Date: -- )   Interpretation of Score: 20 questions each scored on a 5 point scale with 0 representing \"extreme difficulty or unable to perform\" and 4 representing \"no difficulty\". The lower the score, the greater the functional disability. 80/80 represents no disability. Minimal detectable change is 9 points. Medical Necessity:   · Patient demonstrates good rehab potential due to higher previous functional level. Reason for Services/Other Comments:  · Patient continues to require present interventions due to patient's inability to function safely without pain.    Use of outcome tool(s) and clinical judgement create a POC that gives a: Clear prediction of patient's progress: LOW COMPLEXITY     Total Treatment Duration:  PT Patient Time In/Time Out  Time In: 0910  Time Out: Raman Stoner PT

## 2020-03-04 NOTE — PROGRESS NOTES
Carolyn Ballesteros  : 1950  [place]  Secondary: [place] @ [address], [place].  [phone]   [phone]      OUTPATIENT PHYSICAL THERAPY: Daily Treatment Note  3/4/2020   ICD-10: Treatment Diagnosis: Pain in right knee (M25.561) and Stiffness of right knee, not elsewhere classified (M25.661) and Muscle weakness (generalized) (M62.81) and Other abnormalities of gait and mobility (R26.89)     R TKA 20  Effective Dates: 3/4/2020 TO 2020 (90 days). Frequency/Duration: 2-3 times a week for 90 Days  GOALS: (Goals have been discussed and agreed upon with patient.)  Short-Term Functional Goals: Time Frame: 4 weeks  1. Pt report compliance with HEP  2. Pt to demonstrate good quad set with full knee ext for normal gait mechanics  Discharge Goals: Time Frame: 12 weeks  1. Pt to restore 120 degrees flex R knee for normal activity  2. Pt to increase quad strength for sit to stand without UE support  3. Pt to increase hip strength for reciprocal gait on stairs  4. Pt to demonstrate SLS > 15 sec for safe amb all surfaces    _________________________________________________________________________  Pre-treatment Symptoms/Complaints:  Ms. Justino Bryan presents with stiffness, weakness, pain, swelling following R TKA  Pain: Initial: 5/10 Post Session:  3/10   Medications Last Reviewed:  3/4/2020    Updated Objective Findings:     Observation/Orthostatic Postural Assessment:          Redness and swelling inf-lat R knee. Pt keeps R hip ER to avoid knee ext. Palpation:          Hypersensitive around entire R knee.   ROM:      AROM       Right  Knee flex-ext 85-12  DF 0 Left  102-15  0   Strength:      Fair quad set R  3/5 hip abd, hip ext B        Neurological Screen:        Unremarkable   Functional Mobility:         Gait/Ambulation:  Slight limp with rolling walker        Transfers:  Uses UE's, holds breath        Stairs:  Step-to pattern  Balance:          Unable to assess    Tool Used: Lower Extremity Functional Scale (LEFS)  Score:  Initial: 38/80 Most Recent: X/80 (Date: -- )   Interpretation of Score: 20 questions each scored on a 5 point scale with 0 representing \"extreme difficulty or unable to perform\" and 4 representing \"no difficulty\". The lower the score, the greater the functional disability. 80/80 represents no disability. Minimal detectable change is 9 points. See evaluation note from today     TREATMENT:   THERAPEUTIC ACTIVITY: ( see below for minutes): Therapeutic activities per grid below to improve mobility. Required moderate verbal and manual cues to improve functional mobility . THERAPEUTIC EXERCISE: (see below for minutes):  Exercises per grid below to improve strength. Required moderate verbal and manual cues to promote proper body alignment, promote proper body posture, promote proper body mechanics and promote proper body breathing techniques. Progressed resistance, range, repetitions and complexity of movement as indicated. MANUAL THERAPY: (see below for minutes): Joint mobilization and Soft tissue mobilization was utilized and necessary because of the patient's restricted joint motion, painful spasm, loss of articular motion and restricted motion of soft tissue. MODALITIES: (see below for minutes):      for pain modulation    AQUATIC THERAPY (see below for minutes): Aquatic treatment performed per flow grid for Decreased muscle strength, Decreased endurance, Decreased static/dynamic balance and reactive control, Decreased activity endurance, Decompression, Ease of movement and Low impact and reduced weight bearing activity.     Date: 3/4/20       Modalities: 15 mins       Game ready R Seated                Manual Therapy: 5 mins       Patellar mobs Sup-inf       STM R ant knee supine       Aquatics Activities:        GAIT (F/B/S/M)        SLR gait        Hamstring Curl gait         Rockettes        Squats        Lunges        Deep well:  Bicycling        Deep well: Jumping jacks        Deep well: Scissoring        Hamstring stretch        Piriformis stretch                                Therapeutic Exercises: 25 mins       Pt education, postural education,HEP, functional breathing 10 mins       Quad set  X 20       SLR R X 20       Prone lying for ext ROM 3 mins       PKF with assist R X 10               Therapeutic Activities:                          HEP: see hand out    MedBridge Portal  Treatment/Session Summary:    · Response to Treatment:  Pt had good understanding and regina to information presented. · Communication/Consultation:  None today  · Equipment provided today:  None today  · Recommendations/Intent for next treatment session: Next visit will focus on ex's for strength, motion, and balance, manual techniques, modalities as indicated.     Total Treatment Billable Duration:  45 mins  PT Patient Time In/Time Out  Time In: 0910  Time Out: [address] [facility] [place], [place]    Future Appointments   Date Time Provider Jenae Dozier   3/6/2020  8:45 AM [facility]   3/9/2020  9:30 AM Martín Stoner, PT [facility]   3/12/2020  8:45 AM Mariela Jackson, PTA [facility]   3/13/2020  8:45 AM RenettaMariela nugent, PTA [facility]   3/16/2020  8:45 AM Mariela Jackson, PTA [facility]   3/19/2020  8:45 AM RenettaMariela nugent, PTA [facility]   3/20/2020  8:45 AM Mariela Jackson, PTA [facility]   3/23/2020  8:45 AM RenettaMariela nugent, PTA [facility]   3/25/2020  8:45 AM RenettaMariela nugent, PTA [facility]   3/27/2020  8:45 AM Mariela Jackson, PTA [facility]   3/30/2020  8:45 AM RenettaMariela nugent, PTA [facility]   3/31/2020  8:00 AM SUNITA Anderson BS ENDO   6/4/2020  8:40 AM Rocio Dean NP [facility]

## 2020-03-06 ENCOUNTER — HOSPITAL ENCOUNTER (OUTPATIENT)
Dept: PHYSICAL THERAPY | Age: 70
Discharge: HOME | End: 2020-03-06
Payer: MEDICARE

## 2020-03-06 PROCEDURE — 97113 AQUATIC THERAPY/EXERCISES: CPT

## 2020-03-06 PROCEDURE — 97016 VASOPNEUMATIC DEVICE THERAPY: CPT

## 2020-03-06 NOTE — PROGRESS NOTES
Shaun Mcdonough  : 1950  [place]  Secondary: [place] @ [address], [place].  [phone]   [phone]      OUTPATIENT PHYSICAL THERAPY: Daily Treatment Note  3/6/2020   ICD-10: Treatment Diagnosis: Pain in right knee (M25.561) and Stiffness of right knee, not elsewhere classified (M25.661) and Muscle weakness (generalized) (M62.81) and Other abnormalities of gait and mobility (R26.89)     R TKA 20  Effective Dates: 3/4/2020 TO 2020 (90 days). Frequency/Duration: 2-3 times a week for 90 Days  GOALS: (Goals have been discussed and agreed upon with patient.)  Short-Term Functional Goals: Time Frame: 4 weeks  1. Pt report compliance with HEP  2. Pt to demonstrate good quad set with full knee ext for normal gait mechanics  Discharge Goals: Time Frame: 12 weeks  1. Pt to restore 120 degrees flex R knee for normal activity  2. Pt to increase quad strength for sit to stand without UE support  3. Pt to increase hip strength for reciprocal gait on stairs  4. Pt to demonstrate SLS > 15 sec for safe amb all surfaces    _________________________________________________________________________  Pre-treatment Symptoms/Complaints:  \"I had some pain when I woke up but it's calmed down some. \"  Pain: Initial: 4/10 Post Session:  2/10 post-game ready   Medications Last Reviewed:  3/6/2020    Updated Objective Findings:     Observation/Orthostatic Postural Assessment:          Redness and swelling inf-lat R knee. Pt keeps R hip ER to avoid knee ext. Palpation:          Hypersensitive around entire R knee.   ROM:      AROM       Right  Knee flex-ext 85-12  DF 0 Left  102-15  0   Strength:      Fair quad set R  3/5 hip abd, hip ext B        Neurological Screen:        Unremarkable   Functional Mobility:         Gait/Ambulation:  Slight limp with rolling walker        Transfers:  Uses UE's, holds breath        Stairs:  Step-to pattern  Balance:          Unable to assess    Tool Used: Lower Extremity Functional Scale (LEFS)  Score:  Initial: 38/80 Most Recent: X/80 (Date: -- )   Interpretation of Score: 20 questions each scored on a 5 point scale with 0 representing \"extreme difficulty or unable to perform\" and 4 representing \"no difficulty\". The lower the score, the greater the functional disability. 80/80 represents no disability. Minimal detectable change is 9 points. See evaluation note from today     TREATMENT:   THERAPEUTIC ACTIVITY: ( see below for minutes): Therapeutic activities per grid below to improve mobility. Required moderate verbal and manual cues to improve functional mobility . THERAPEUTIC EXERCISE: (see below for minutes):  Exercises per grid below to improve strength. Required moderate verbal and manual cues to promote proper body alignment, promote proper body posture, promote proper body mechanics and promote proper body breathing techniques. Progressed resistance, range, repetitions and complexity of movement as indicated. MANUAL THERAPY: (see below for minutes): Joint mobilization and Soft tissue mobilization was utilized and necessary because of the patient's restricted joint motion, painful spasm, loss of articular motion and restricted motion of soft tissue. MODALITIES: (see below for minutes):      for pain modulation    AQUATIC THERAPY (see below for minutes): Aquatic treatment performed per flow grid for Decreased muscle strength, Decreased endurance, Decreased static/dynamic balance and reactive control, Decreased activity endurance, Decompression, Ease of movement and Low impact and reduced weight bearing activity.     Date: 3/4/20 3/6/20      Modalities: 15 mins 15 mins      Game ready R Seated  Seated              Manual Therapy: 5 mins       Patellar mobs Sup-inf       STM R ant knee supine       Aquatics Activities:  55 mins      GAIT (F/B/S/M)  2 laps ea SLR gait  2 laps      Hamstring Curl gait   2 laps      Rockettes  2 laps      Squats  X 20      Lunges        Deep well:  Bicycling  4 min      Deep well: Jumping jacks  2 min      Deep well: Scissoring  2 min      Hamstring stretch  2 x 30 sec      Piriformis stretch        Hip abduction  X 20 B      Heel raises  X 20              Therapeutic Exercises: 25 mins       Pt education, postural education,HEP, functional breathing 10 mins       Quad set  X 20       SLR R X 20       Prone lying for ext ROM 3 mins       PKF with assist R X 10               Therapeutic Activities:                          HEP: see hand out    MedBridge Portal  Treatment/Session Summary:    · Response to Treatment:  Initiated aquatics with pt today. She is comfortable with the environment as she has had aquatic therapy for her L TKA in th past.  Pt is observed to hold her R knee stiff during ambulation on gait and is cued to allow flexion for fluidity of movement. During gait exercises in the water pt has one hand on rail for stabilization. Concluded session with game ready to decrease soreness and edema. Continue as indicated. · Communication/Consultation:  None today  · Equipment provided today:  None today  · Recommendations/Intent for next treatment session: Next visit will focus on ex's for strength, motion, and balance, manual techniques, modalities as indicated.     Total Treatment Billable Duration:  70 mins  PT Patient Time In/Time Out  Time In: 0830  Time Out: Fish [address], PTA    Future Appointments   Date Time Provider Jenae Dozier   3/9/2020  9:30 AM Sivan Stoner, PT [facility]   3/12/2020  8:45 AM Domonique Jacksonon Maxim, PTA [facility]   3/13/2020  8:45 AM [facility]ivan Stoner, [facility]   3/16/2020  8:45 AM RenettaTahiraCortland Maxim, PTA [facility]   3/19/2020  8:45 AM Domonique Jacksonon Maxim, PTA [facility]   3/20/2020  8:45 AM RenettaTahiraDerick Maxim, PTA [facility]   3/23/2020  8:45 AM [facility]   3/25/2020  8:45 AM Renetta, Chip , PTA [facility]   3/27/2020  8:45 AM Renetta, Chip , PTA [facility]   3/30/2020  8:45 AM Renetta, Chip , PTA [facility]   3/31/2020  8:00 AM Dorie Balderas PA-C [facility]   6/4/2020  8:40 AM Mello Lopez NP [facility]

## 2020-03-09 ENCOUNTER — HOSPITAL ENCOUNTER (OUTPATIENT)
Dept: PHYSICAL THERAPY | Age: 70
Discharge: HOME | End: 2020-03-09
Payer: MEDICARE

## 2020-03-09 PROCEDURE — 97113 AQUATIC THERAPY/EXERCISES: CPT

## 2020-03-09 PROCEDURE — 97016 VASOPNEUMATIC DEVICE THERAPY: CPT

## 2020-03-09 NOTE — PROGRESS NOTES
Jaziel Stephanie  : 1950  [place]  Secondary: [place] @ [address], [place].  [phone]   [phone]      OUTPATIENT PHYSICAL THERAPY: Daily Treatment Note  3/9/2020   ICD-10: Treatment Diagnosis: Pain in right knee (M25.561) and Stiffness of right knee, not elsewhere classified (M25.661) and Muscle weakness (generalized) (M62.81) and Other abnormalities of gait and mobility (R26.89)     R TKA 20  Effective Dates: 3/4/2020 TO 2020 (90 days). Frequency/Duration: 2-3 times a week for 90 Days  GOALS: (Goals have been discussed and agreed upon with patient.)  Short-Term Functional Goals: Time Frame: 4 weeks  1. Pt report compliance with HEP  2. Pt to demonstrate good quad set with full knee ext for normal gait mechanics  Discharge Goals: Time Frame: 12 weeks  1. Pt to restore 120 degrees flex R knee for normal activity  2. Pt to increase quad strength for sit to stand without UE support  3. Pt to increase hip strength for reciprocal gait on stairs  4. Pt to demonstrate SLS > 15 sec for safe amb all surfaces    _________________________________________________________________________  Pre-treatment Symptoms/Complaints: It seems okay I think. I don't like the cold or having it straight. I have been stretching it like you said. Pain: Initial: -2/10 Post Session:  That isn't too bad now. Medications Last Reviewed:  3/9/2020    Updated Objective Findings:     Observation/Orthostatic Postural Assessment:          Redness and swelling inf-lat R knee. Pt keeps R hip ER to avoid knee ext. Palpation:          Hypersensitive around entire R knee.   ROM:      AROM       Right       Knee flex-ext 85-12  DF 0 Left  102-15  0   Strength:      Fair quad set R  3/5 hip abd, hip ext B        Functional Mobility:         Gait/Ambulation:  Slight limp with rolling walker Transfers:  Uses UE's, holds breath        Stairs:  Step-to pattern  Balance:          Unable to assess    Tool Used: Lower Extremity Functional Scale (LEFS)  Score:  Initial: 38/80 Most Recent: X/80 (Date: -- )   Interpretation of Score: 20 questions each scored on a 5 point scale with 0 representing \"extreme difficulty or unable to perform\" and 4 representing \"no difficulty\". The lower the score, the greater the functional disability. 80/80 represents no disability. Minimal detectable change is 9 points. TREATMENT:   THERAPEUTIC ACTIVITY: ( see below for minutes): Therapeutic activities per grid below to improve mobility. Required moderate verbal and manual cues to improve functional mobility . THERAPEUTIC EXERCISE: (see below for minutes):  Exercises per grid below to improve strength. Required moderate verbal and manual cues to promote proper body alignment, promote proper body posture, promote proper body mechanics and promote proper body breathing techniques. Progressed resistance, range, repetitions and complexity of movement as indicated. MANUAL THERAPY: (see below for minutes): Joint mobilization and Soft tissue mobilization was utilized and necessary because of the patient's restricted joint motion, painful spasm, loss of articular motion and restricted motion of soft tissue. MODALITIES: (see below for minutes):      for pain modulation    AQUATIC THERAPY (see below for minutes): Aquatic treatment performed per flow grid for Decreased muscle strength, Decreased endurance, Decreased static/dynamic balance and reactive control, Decreased activity endurance, Decompression, Ease of movement and Low impact and reduced weight bearing activity.     Date: 3/4/20 3/6/20 3/9/20  Visit 3     Modalities: 15 mins 15 mins 15 mins     Game ready R Seated  Seated Seated              Manual Therapy: 5 mins       Patellar mobs Sup-inf       STM R ant knee supine       Aquatics Activities:  55 mins 55 mins GAIT (F/B/S/M)  2 laps ea 2 laps     SLR gait  2 laps 2 laps     Hamstring Curl gait   2 laps 2 laps     Rockettes  2 laps 2 laps     Squats  X 20 X 30     Lunges        Deep well:  Bicycling  4 min 3 mins     Deep well: Jumping jacks  2 min 3 mins     Deep well: Scissoring  2 min 3 mins     Hamstring stretch B  2 x 30 sec 2 x 20 sec     Hip abduction B  X 20 B X 30     Hip extension B   X 30     Heel raises  X 20 X 30             Therapeutic Exercises: 25 mins  5 mins     Pt education, postural education,HEP, functional breathing 10 mins       Quad set  X 20       SLR R X 20       Prone lying for ext ROM 3 mins       PKF with assist R X 10  X 20             Therapeutic Activities:                          HEP: see hand out    [employer] Portal  Treatment/Session Summary:    · Response to Treatment: Pt achieved 100-10 after session. She needs cues to roll over toe in gait for knee flex and not to circumduct when picking leg up for steps. Good overall progress. Continue as indicated. · Communication/Consultation:  None today  · Equipment provided today:  None today  · Recommendations/Intent for next treatment session: Next visit will focus on ex's for strength, motion, and balance, manual techniques, modalities as indicated.     Total Treatment Billable Duration:  75 mins  PT Patient Time In/Time Out  Time In: 0920  Time Out: 2000 Amador Sue, PT    Future Appointments   Date Time Provider Jenae Dozier   3/12/2020  8:45 AM [facility]   3/13/2020  8:45 AM [facility]oner[facility]   3/16/2020  8:45 AM Renetta, Bhumika Arverne, PTA [facility]   3/19/2020  8:45 AM Renetta, Bhumika Arverne, PTA [facility]   3/20/2020  8:45 AM Renetta, Bhumika Arverne, PTA [facility]   3/23/2020  8:45 AM Renetta, Bhumika Arverne, PTA [facility]   3/25/2020  8:45 AM Renetta[facility] Bhum[facility] Arverne, [facility]   3/27/2020  8:45 AM Renetta, Bhumika Arverne, PTA [facility]   3/30/2020  8:45 AM Price Cuevas PTA [facility]   3/31/2020  8:00 AM Sofia Tinoco PA-C END BS ENDO   6/4/2020  8:40 AM Yi Hu NP [facility]

## 2020-03-10 ENCOUNTER — PATIENT OUTREACH (OUTPATIENT)
Dept: CASE MANAGEMENT | Age: 70
End: 2020-03-10

## 2020-03-10 NOTE — PROGRESS NOTES
This note will not be viewable in [address] Transitions of Care  Follow up Outreach Note   Outreach type Phone call: Spoke with patient   Home visit:   Date/Time of Outreach: 3/10/2020 11:25 am      Has patient attended PCP or specialist follow-up appointments since last contact? What was outcome of appointment? When is next follow-up scheduled? FU appointment completed with Dr. Bran Navarro with future appointments scheduled appropriately. Review medications. Any medication changes since last outreach? Does patient have any questions or issues related to their medications? Medications reviewed with patient with no changes to medication regimen. Home health active? If yes  any issue? Progress? Patient attending [facility] for PT therapy twice weekly. Referrals needed?  (CM, SW, HH, etc. )   No   Other issues/Miscellaneous? (Transportation, access to meals, ability to perform ADLs, adequate caregiver support, etc.) Patient independent with ADLs. Next Outreach Scheduled? Graduation from program?   Yes      Next Steps/Goals (if applicable):   Contact information for CC given to patient if any needs arise.       Outreach completed by:   Angelica Siemens LPN Care Coordinator

## 2020-03-12 ENCOUNTER — HOSPITAL ENCOUNTER (OUTPATIENT)
Dept: PHYSICAL THERAPY | Age: 70
Discharge: HOME | End: 2020-03-12
Payer: MEDICARE

## 2020-03-13 ENCOUNTER — HOSPITAL ENCOUNTER (OUTPATIENT)
Dept: PHYSICAL THERAPY | Age: 70
Discharge: HOME | End: 2020-03-13
Payer: MEDICARE

## 2020-03-13 PROCEDURE — 97113 AQUATIC THERAPY/EXERCISES: CPT

## 2020-03-13 PROCEDURE — 97110 THERAPEUTIC EXERCISES: CPT

## 2020-03-13 NOTE — PROGRESS NOTES
Dean Azaregal  : 1950  [place]  Secondary: [place] @ [address], [place].  [phone]   [phone]      OUTPATIENT PHYSICAL THERAPY: Daily Treatment Note  3/13/2020   ICD-10: Treatment Diagnosis: Pain in right knee (M25.561) and Stiffness of right knee, not elsewhere classified (M25.661) and Muscle weakness (generalized) (M62.81) and Other abnormalities of gait and mobility (R26.89)     R TKA 20  Effective Dates: 3/4/2020 TO 2020 (90 days). Frequency/Duration: 2-3 times a week for 90 Days  GOALS: (Goals have been discussed and agreed upon with patient.)  Short-Term Functional Goals: Time Frame: 4 weeks  1. Pt report compliance with HEP  2. Pt to demonstrate good quad set with full knee ext for normal gait mechanics  Discharge Goals: Time Frame: 12 weeks  1. Pt to restore 120 degrees flex R knee for normal activity  2. Pt to increase quad strength for sit to stand without UE support  3. Pt to increase hip strength for reciprocal gait on stairs  4. Pt to demonstrate SLS > 15 sec for safe amb all surfaces    _________________________________________________________________________  Pre-treatment Symptoms/Complaints: The doctor said there is some tissue on the side that needs to be moved to get better. He let me drive so I drove today for the first time. It is sore from that. I don't like keeping it straight or using the ice here. It is too cold. It gets so stiff on me. Pain: Initial: 4/10 Post Session: It is really tired and a little sore. Medications Last Reviewed:  3/13/2020    Updated Objective Findings:     Observation/Orthostatic Postural Assessment:          Redness and swelling inf-lat R knee. Pt keeps R hip ER to avoid knee ext. Palpation:          Hypersensitive around entire R knee.   ROM:      AROM       Right     3/13/20  Knee flex-ext 94-10 after  DF 0 Left  102-15  0   Strength:      Fair quad set R  3/5 hip abd, hip ext B        Functional Mobility:         Gait/Ambulation:  Slight limp with rolling walker        Transfers:  Uses UE's, holds breath        Stairs:  Step-to pattern  Balance:          Unable to assess    Tool Used: Lower Extremity Functional Scale (LEFS)  Score:  Initial: 38/80 Most Recent: X/80 (Date: -- )   Interpretation of Score: 20 questions each scored on a 5 point scale with 0 representing \"extreme difficulty or unable to perform\" and 4 representing \"no difficulty\". The lower the score, the greater the functional disability. 80/80 represents no disability. Minimal detectable change is 9 points. TREATMENT:   THERAPEUTIC ACTIVITY: ( see below for minutes): Therapeutic activities per grid below to improve mobility. Required moderate verbal and manual cues to improve functional mobility . THERAPEUTIC EXERCISE: (see below for minutes):  Exercises per grid below to improve strength. Required moderate verbal and manual cues to promote proper body alignment, promote proper body posture, promote proper body mechanics and promote proper body breathing techniques. Progressed resistance, range, repetitions and complexity of movement as indicated. MANUAL THERAPY: (see below for minutes): Joint mobilization and Soft tissue mobilization was utilized and necessary because of the patient's restricted joint motion, painful spasm, loss of articular motion and restricted motion of soft tissue. MODALITIES: (see below for minutes):      for pain modulation    AQUATIC THERAPY (see below for minutes): Aquatic treatment performed per flow grid for Decreased muscle strength, Decreased endurance, Decreased static/dynamic balance and reactive control, Decreased activity endurance, Decompression, Ease of movement and Low impact and reduced weight bearing activity.     Date: 3/4/20 3/6/20 3/9/20  Visit 3 3/13/20  Visit 4    Modalities: 15 mins 15 mins 15 mins     Game ready R Seated  Seated Seated  declined            Manual Therapy: 5 mins   5 mins    Patellar mobs Sup-inf       STM R ant knee supine   Lat knee    Aquatics Activities:  55 mins 55 mins 55 mins    GAIT (F/B/S/M)  2 laps ea 2 laps 2 laps    SLR gait  2 laps 2 laps 2 laps    Hamstring Curl gait   2 laps 2 laps 2 laps    Rockettes  2 laps 2 laps 2 laps    Squats  X 20 X 30 X 30    Deep well:  Bicycling  4 min 3 mins 3 mins    Deep well: Jumping jacks  2 min 3 mins 3 mins    Deep well: Scissoring  2 min 3 mins 3 mins    Hamstring stretch B  2 x 30 sec 2 x 20 sec 3 x 20 sec    Hip abduction B  X 20 B X 30 X 30    Hip extension B   X 30 X 30    Heel raises  X 20 X 30 X 30    Step ups ant B    X 20            Therapeutic Exercises: 25 mins  5 mins 10 mins    Pt education, postural education,HEP, functional breathing 10 mins       Quad set  X 20       SLR R X 20       Prone lying for ext ROM 3 mins       PKF with assist R X 10  X 20     Seated knee flex, ext, piriformis stretches     prolonged                    Therapeutic Activities:                          HEP: see hand out    [facility]  Treatment/Session Summary:    · Response to Treatment:  Pt is making steady progress but she remains hesitant to load the R LE fully. Continued emphasis on motion and strength indicated. She has increased swelling, marked tenderness ant and lat knee. Pt declines using ice in the clinic. She was strongly encouraged to work on flex, ext and seated piriformis stretches hourly at home to eliminate her compensations and avoidance of end range positions. Continue as indicated. · Communication/Consultation:  None today  · Equipment provided today:  None today  · Recommendations/Intent for next treatment session: Next visit will focus on ex's for strength, motion, and balance, manual techniques, modalities as indicated.     Total Treatment Billable Duration:  70 mins  PT Patient Time In/Time Out  Time In: 0845  Time Out: [address] [facility] [place], PT    Future Appointments   Date Time Provider Jenae Tasia   3/16/2020  8:45 AM Carmenza Escort, PTA [facility]   3/19/2020  8:45 AM Renetta, Vedia Bryan, PTA [facility]   3/20/2020  8:45 AM Renetta, Vedia Bryan, PTA [facility]   3/23/2020  8:45 AM Renetta, Vedia Bryan, PTA [facility]   3/25/2020  8:45 AM Renetta, Vedia Bryan, PTA [facility]   3/27/2020  8:45 AM Renetta, Vedia Bryan, PTA [facility]   3/30/2020  8:45 AM Renetta, Vedia Bryan, PTA [facility]   3/31/2020  8:00 AM Lizy Balderas PA-C END BS ENDO   6/4/2020  8:40 AM Jensen Warner NP [facility] PRE PRE

## 2020-03-16 ENCOUNTER — HOSPITAL ENCOUNTER (OUTPATIENT)
Dept: PHYSICAL THERAPY | Age: 70
Discharge: HOME | End: 2020-03-16
Payer: MEDICARE

## 2020-03-16 PROCEDURE — 97016 VASOPNEUMATIC DEVICE THERAPY: CPT

## 2020-03-16 PROCEDURE — 97113 AQUATIC THERAPY/EXERCISES: CPT

## 2020-03-16 NOTE — PROGRESS NOTES
Valarie Cheng  : 1950  [place]  Secondary: [place] [address]  [phone]   [phone]      OUTPATIENT PHYSICAL THERAPY: Daily Treatment Note  3/16/2020   ICD-10: Treatment Diagnosis: Pain in right knee (M25.561) and Stiffness of right knee, not elsewhere classified (M25.661) and Muscle weakness (generalized) (M62.81) and Other abnormalities of gait and mobility (R26.89)     R TKA 20  Effective Dates: 3/4/2020 TO 2020 (90 days). Frequency/Duration: 2-3 times a week for 90 Days  GOALS: (Goals have been discussed and agreed upon with patient.)  Short-Term Functional Goals: Time Frame: 4 weeks  1. Pt report compliance with HEP  2. Pt to demonstrate good quad set with full knee ext for normal gait mechanics  Discharge Goals: Time Frame: 12 weeks  1. Pt to restore 120 degrees flex R knee for normal activity  2. Pt to increase quad strength for sit to stand without UE support  3. Pt to increase hip strength for reciprocal gait on stairs  4. Pt to demonstrate SLS > 15 sec for safe amb all surfaces    _________________________________________________________________________  Pre-treatment Symptoms/Complaints: It's uncomfortable to stretch. I've been walking around the house and moving my leg when I'm sitting down. It's still bothering me where that stitch is trying to come out. Pain: Initial: 4/10  Post Session: 2/10 \"It's okay. \"   Medications Last Reviewed:  3/16/2020    Updated Objective Findings:     Observation/Orthostatic Postural Assessment:          Redness and swelling inf-lat R knee. Pt keeps R hip ER to avoid knee ext. Palpation:          Hypersensitive around entire R knee.   ROM:      AROM       Right     3/13/20  Knee flex-ext 94-10 after  DF 0 Left  102-15  0   Strength:      Fair quad set R  3/5 hip abd, hip ext B        Functional Mobility: Gait/Ambulation:  Slight limp with rolling walker        Transfers:  Uses UE's, holds breath        Stairs:  Step-to pattern  Balance:          Unable to assess    Tool Used: Lower Extremity Functional Scale (LEFS)  Score:  Initial: 38/80 Most Recent: X/80 (Date: -- )   Interpretation of Score: 20 questions each scored on a 5 point scale with 0 representing \"extreme difficulty or unable to perform\" and 4 representing \"no difficulty\". The lower the score, the greater the functional disability. 80/80 represents no disability. Minimal detectable change is 9 points. TREATMENT:   THERAPEUTIC ACTIVITY: ( see below for minutes): Therapeutic activities per grid below to improve mobility. Required moderate verbal and manual cues to improve functional mobility . THERAPEUTIC EXERCISE: (see below for minutes):  Exercises per grid below to improve strength. Required moderate verbal and manual cues to promote proper body alignment, promote proper body posture, promote proper body mechanics and promote proper body breathing techniques. Progressed resistance, range, repetitions and complexity of movement as indicated. MANUAL THERAPY: (see below for minutes): Joint mobilization and Soft tissue mobilization was utilized and necessary because of the patient's restricted joint motion, painful spasm, loss of articular motion and restricted motion of soft tissue. MODALITIES: (see below for minutes):      for pain modulation    AQUATIC THERAPY (see below for minutes): Aquatic treatment performed per flow grid for Decreased muscle strength, Decreased endurance, Decreased static/dynamic balance and reactive control, Decreased activity endurance, Decompression, Ease of movement and Low impact and reduced weight bearing activity.     Date: 3/4/20 3/6/20 3/9/20  Visit 3 3/13/20  Visit 4 3/16/20   Visit 5   Modalities: 15 mins 15 mins 15 mins  15 min   Game ready R Seated  Seated Seated  declined Seated           Manual Therapy: 5 mins   5 mins    Patellar mobs Sup-inf       STM R ant knee supine   Lat knee    Aquatics Activities:  55 mins 55 mins 55 mins 55 min   GAIT (F/B/S/M)  2 laps ea 2 laps 2 laps 2 laps   SLR gait  2 laps 2 laps 2 laps 2 laps   Hamstring Curl gait   2 laps 2 laps 2 laps 2 laps   Rockettes  2 laps 2 laps 2 laps 2 laps   Squats  X 20 X 30 X 30 X 30   Deep well:  Bicycling  4 min 3 mins 3 mins 3 min   Deep well: Jumping jacks  2 min 3 mins 3 mins 3 min   Deep well: Scissoring  2 min 3 mins 3 mins 3 min   Hamstring stretch B  2 x 30 sec 2 x 20 sec 3 x 20 sec 3 x 20 sec   Hip abduction B  X 20 B X 30 X 30 X 30   Hip extension B   X 30 X 30 X 30   Heel raises  X 20 X 30 X 30 X 30   Step ups ant B    X 20 X 20   Clamshells B     X 20    Therapeutic Exercises: 25 mins  5 mins 10 mins    Pt education, postural education,HEP, functional breathing 10 mins       Quad set  X 20       SLR R X 20       Prone lying for ext ROM 3 mins       PKF with assist R X 10  X 20     Seated knee flex, ext, piriformis stretches     prolonged                    Therapeutic Activities:                          HEP: see hand out    [facility] Portal  Treatment/Session Summary:    · Response to Treatment:  Continued aquatics with pt today which was regina okay. Pt reports that she did not perform her exercises or stretches over the weekend but that she has been walking around the house. During exercises in water pt will not put full weight on RLE and leads gait with B toe out. Pt agreed to game ready with decreased intensity today for edema management. Continue as indicated. · Communication/Consultation:  None today  · Equipment provided today:  None today  · Recommendations/Intent for next treatment session: Next visit will focus on ex's for strength, motion, and balance, manual techniques, modalities as indicated.     Total Treatment Billable Duration:  70 mins  PT Patient Time In/Time Out  Time In: 0835  Time Out: [address], PTA    Future Appointments   Date Time Provider Jenae Tasia   3/19/2020  8:45 AM Renetta, Tim Both, PTA [facility]   3/20/2020  8:45 AM Renetta, Tim Both, PTA [facility]   3/23/2020  8:45 AM Renetta, Tim Both, PTA [facility]   3/25/2020  8:45 AM Renetta, Tim Both, PTA [facility]   3/27/2020  8:45 AM Renetta, Tim Both, PTA [facility]   3/30/2020  8:45 AM Renetta, Tim Both, PTA [facility]   3/31/2020  8:00 AM Anupama Balderas PA-C END [facility] ENDO   4/1/2020  8:45 AM Ulysses, Lavella Bers, PT [facility]   4/3/2020  8:45 AM Georgiana, Lavella Bers, PT [facility]   4/6/2020  8:45 AM [place], Lavella Bers, PT [facility]   4/8/2020  8:45 AM Renetta, Tim Both, PTA [facility]   4/9/2020  8:45 AM Renetta, Tim Both, PTA [facility]   4/13/2020  8:45 AM Renetta, Tim Both, PTA [facility]   4/15/2020  8:45 AM Ulysses, Lavella Bers, PT [facility]   4/17/2020  8:45 AM Georgiana, Lavella Bers, PT [facility]   4/20/2020  8:45 AM Ulysses, Lavella Bers, PT [facility]   4/22/2020  9:30 AM Renetta, Tim Both, PTA [facility]   4/24/2020  8:45 AM Ulysses, Lavella Bers, PT [facility]   4/27/2020  8:45 AM [place], Lavella Bers, PT [facility]   4/29/2020  8:45 AM Ulysses, Lavella Bers, PT [facility]   4/30/2020  8:45 AM Ulysses, Lavella Bers, PT [facility]   6/4/2020  8:40 AM JAYDON Ziegler PRE PRE

## 2020-03-19 ENCOUNTER — HOSPITAL ENCOUNTER (OUTPATIENT)
Dept: PHYSICAL THERAPY | Age: 70
Discharge: HOME | End: 2020-03-19
Payer: MEDICARE

## 2020-03-19 NOTE — THERAPY DISCHARGE
Jermaine Edwards   (GYU:[id number]) [address], [address] [place] [address]  [phone]   [phone]           Discontinuation summary    REFERRING PHYSICIAN: Ambrose Simmonds, MD  MEDICAL/REFERRING DIAGNOSIS:  · Presence of right artificial knee joint [Z96.651]    ASSESSMENT:DATE: 3/19/2020    PROGRESS: Jermaine Edwards currently demonstrates 102 deg flexion and 15 deg ext in R knee. During therapy she is hesitant to transition to full WB on RLE. Pt appeared non-compliant with HEP. RECOMMENDATIONS: Discharge patient due state mandated office closure due to public health concerns.     Thank you for this referral,  Jillian Jackson, PTA

## 2020-03-20 ENCOUNTER — APPOINTMENT (OUTPATIENT)
Dept: PHYSICAL THERAPY | Age: 70
End: 2020-03-20
Payer: MEDICARE

## 2020-03-23 ENCOUNTER — APPOINTMENT (OUTPATIENT)
Dept: PHYSICAL THERAPY | Age: 70
End: 2020-03-23
Payer: MEDICARE

## 2020-03-25 ENCOUNTER — APPOINTMENT (OUTPATIENT)
Dept: PHYSICAL THERAPY | Age: 70
End: 2020-03-25
Payer: MEDICARE

## 2020-03-27 ENCOUNTER — APPOINTMENT (OUTPATIENT)
Dept: PHYSICAL THERAPY | Age: 70
End: 2020-03-27
Payer: MEDICARE

## 2020-03-30 ENCOUNTER — APPOINTMENT (OUTPATIENT)
Dept: PHYSICAL THERAPY | Age: 70
End: 2020-03-30
Payer: MEDICARE

## 2020-04-01 ENCOUNTER — APPOINTMENT (OUTPATIENT)
Dept: PHYSICAL THERAPY | Age: 70
End: 2020-04-01

## 2020-04-03 ENCOUNTER — APPOINTMENT (OUTPATIENT)
Dept: PHYSICAL THERAPY | Age: 70
End: 2020-04-03

## 2020-04-06 ENCOUNTER — APPOINTMENT (OUTPATIENT)
Dept: PHYSICAL THERAPY | Age: 70
End: 2020-04-06

## 2020-04-08 ENCOUNTER — APPOINTMENT (OUTPATIENT)
Dept: PHYSICAL THERAPY | Age: 70
End: 2020-04-08

## 2020-04-09 ENCOUNTER — APPOINTMENT (OUTPATIENT)
Dept: PHYSICAL THERAPY | Age: 70
End: 2020-04-09

## 2020-04-13 ENCOUNTER — APPOINTMENT (OUTPATIENT)
Dept: PHYSICAL THERAPY | Age: 70
End: 2020-04-13

## 2020-04-15 ENCOUNTER — APPOINTMENT (OUTPATIENT)
Dept: PHYSICAL THERAPY | Age: 70
End: 2020-04-15

## 2020-04-17 ENCOUNTER — APPOINTMENT (OUTPATIENT)
Dept: PHYSICAL THERAPY | Age: 70
End: 2020-04-17

## 2020-04-20 ENCOUNTER — APPOINTMENT (OUTPATIENT)
Dept: PHYSICAL THERAPY | Age: 70
End: 2020-04-20

## 2020-04-22 ENCOUNTER — APPOINTMENT (OUTPATIENT)
Dept: PHYSICAL THERAPY | Age: 70
End: 2020-04-22

## 2020-04-24 ENCOUNTER — APPOINTMENT (OUTPATIENT)
Dept: PHYSICAL THERAPY | Age: 70
End: 2020-04-24

## 2020-04-27 ENCOUNTER — APPOINTMENT (OUTPATIENT)
Dept: PHYSICAL THERAPY | Age: 70
End: 2020-04-27

## 2020-04-29 ENCOUNTER — APPOINTMENT (OUTPATIENT)
Dept: PHYSICAL THERAPY | Age: 70
End: 2020-04-29

## 2020-04-30 ENCOUNTER — APPOINTMENT (OUTPATIENT)
Dept: PHYSICAL THERAPY | Age: 70
End: 2020-04-30

## 2021-02-01 ENCOUNTER — HOSPITAL ENCOUNTER (OUTPATIENT)
Dept: LAB | Age: 71
Discharge: HOME | End: 2021-02-01

## 2021-02-01 PROCEDURE — 88305 TISSUE EXAM BY PATHOLOGIST: CPT

## 2021-11-18 PROBLEM — N61.1 BREAST ABSCESS: Status: ACTIVE | Noted: 2021-08-07

## 2022-02-24 LAB — MAMMOGRAPHY, EXTERNAL: NORMAL

## 2022-03-18 PROBLEM — E83.52 HYPERCALCEMIA: Status: ACTIVE | Noted: 2019-06-24

## 2022-03-18 PROBLEM — M17.12 ARTHRITIS OF KNEE, LEFT: Status: ACTIVE | Noted: 2019-05-23

## 2022-03-18 PROBLEM — E11.21 TYPE 2 DIABETES WITH NEPHROPATHY (HCC): Status: ACTIVE | Noted: 2018-09-18

## 2022-03-18 PROBLEM — Z96.652 TOTAL KNEE REPLACEMENT STATUS, LEFT: Status: ACTIVE | Noted: 2019-05-24

## 2022-03-19 PROBLEM — R82.90 ABNORMAL URINALYSIS: Status: ACTIVE | Noted: 2018-08-27

## 2022-03-19 PROBLEM — Z96.659 S/P TOTAL KNEE ARTHROPLASTY: Status: ACTIVE | Noted: 2019-05-23

## 2022-03-19 PROBLEM — R79.89 ELEVATED SERUM CREATININE: Status: ACTIVE | Noted: 2018-08-27

## 2022-03-19 PROBLEM — N61.1 BREAST ABSCESS: Status: ACTIVE | Noted: 2021-08-07

## 2022-03-19 PROBLEM — M17.12 OSTEOARTHRITIS OF LEFT KNEE: Status: ACTIVE | Noted: 2018-03-08

## 2022-03-19 PROBLEM — M17.11 ARTHRITIS OF KNEE, RIGHT: Status: ACTIVE | Noted: 2020-02-06

## 2022-03-19 PROBLEM — Z96.651 S/P TKR (TOTAL KNEE REPLACEMENT) USING CEMENT, RIGHT: Status: ACTIVE | Noted: 2020-02-07

## 2022-06-01 ENCOUNTER — OFFICE VISIT (OUTPATIENT)
Dept: FAMILY MEDICINE CLINIC | Facility: CLINIC | Age: 72
End: 2022-06-01
Payer: MEDICARE

## 2022-06-01 VITALS
BODY MASS INDEX: 44.39 KG/M2 | WEIGHT: 260 LBS | TEMPERATURE: 98.5 F | HEIGHT: 64 IN | HEART RATE: 63 BPM | DIASTOLIC BLOOD PRESSURE: 68 MMHG | SYSTOLIC BLOOD PRESSURE: 159 MMHG

## 2022-06-01 DIAGNOSIS — I10 ESSENTIAL HYPERTENSION, BENIGN: Primary | ICD-10-CM

## 2022-06-01 DIAGNOSIS — I10 ESSENTIAL HYPERTENSION, BENIGN: ICD-10-CM

## 2022-06-01 DIAGNOSIS — R60.0 EDEMA OF BOTH LOWER EXTREMITIES: ICD-10-CM

## 2022-06-01 LAB
ANION GAP SERPL CALC-SCNC: 8 MMOL/L (ref 7–16)
BUN SERPL-MCNC: 25 MG/DL (ref 8–23)
CALCIUM SERPL-MCNC: 9.6 MG/DL (ref 8.3–10.4)
CHLORIDE SERPL-SCNC: 105 MMOL/L (ref 98–107)
CO2 SERPL-SCNC: 24 MMOL/L (ref 21–32)
CREAT SERPL-MCNC: 1.4 MG/DL (ref 0.6–1)
GLUCOSE SERPL-MCNC: 168 MG/DL (ref 65–100)
POTASSIUM SERPL-SCNC: 4.7 MMOL/L (ref 3.5–5.1)
SODIUM SERPL-SCNC: 137 MMOL/L (ref 136–145)

## 2022-06-01 PROCEDURE — G8427 DOCREV CUR MEDS BY ELIG CLIN: HCPCS | Performed by: NURSE PRACTITIONER

## 2022-06-01 PROCEDURE — G8399 PT W/DXA RESULTS DOCUMENT: HCPCS | Performed by: NURSE PRACTITIONER

## 2022-06-01 PROCEDURE — 1036F TOBACCO NON-USER: CPT | Performed by: NURSE PRACTITIONER

## 2022-06-01 PROCEDURE — 1090F PRES/ABSN URINE INCON ASSESS: CPT | Performed by: NURSE PRACTITIONER

## 2022-06-01 PROCEDURE — 99214 OFFICE O/P EST MOD 30 MIN: CPT | Performed by: NURSE PRACTITIONER

## 2022-06-01 PROCEDURE — 1123F ACP DISCUSS/DSCN MKR DOCD: CPT | Performed by: NURSE PRACTITIONER

## 2022-06-01 PROCEDURE — 3017F COLORECTAL CA SCREEN DOC REV: CPT | Performed by: NURSE PRACTITIONER

## 2022-06-01 PROCEDURE — G8417 CALC BMI ABV UP PARAM F/U: HCPCS | Performed by: NURSE PRACTITIONER

## 2022-06-01 RX ORDER — HYDRALAZINE HYDROCHLORIDE 50 MG/1
TABLET, FILM COATED ORAL
COMMUNITY
Start: 2022-05-18 | End: 2022-07-29 | Stop reason: SDUPTHER

## 2022-06-01 RX ORDER — HUMAN INSULIN 100 [IU]/ML
INJECTION, SOLUTION SUBCUTANEOUS
Qty: 20 ML | Refills: 11 | OUTPATIENT
Start: 2022-06-01

## 2022-06-01 RX ORDER — FUROSEMIDE 20 MG/1
20 TABLET ORAL DAILY
Qty: 30 TABLET | Refills: 1 | Status: SHIPPED | OUTPATIENT
Start: 2022-06-01 | End: 2022-06-15

## 2022-06-01 ASSESSMENT — PATIENT HEALTH QUESTIONNAIRE - PHQ9
SUM OF ALL RESPONSES TO PHQ QUESTIONS 1-9: 0
SUM OF ALL RESPONSES TO PHQ QUESTIONS 1-9: 0
2. FEELING DOWN, DEPRESSED OR HOPELESS: 0
SUM OF ALL RESPONSES TO PHQ QUESTIONS 1-9: 0
SUM OF ALL RESPONSES TO PHQ9 QUESTIONS 1 & 2: 0
1. LITTLE INTEREST OR PLEASURE IN DOING THINGS: 0
SUM OF ALL RESPONSES TO PHQ QUESTIONS 1-9: 0

## 2022-06-01 ASSESSMENT — ENCOUNTER SYMPTOMS: SHORTNESS OF BREATH: 0

## 2022-06-01 NOTE — PROGRESS NOTES
Subjective:      Patient ID: Neli Vo is a 73 y.o. female. Here for follow up of change of BP med due to a high K. Lisinopril was stopped and she started apresoline 50 tid Tolerating well except for a slight headache. No chest pain Has increased swelling in her lower legs and attributes that to stopping the lisinopril. Has swelling nearly to her knees No shortness of breath lying flat at night and the swelling goes away at night. No chest pain  HPI    Review of Systems   Respiratory: Negative for shortness of breath. Cardiovascular: Positive for leg swelling. Negative for chest pain. Neurological: Positive for headaches. Objective:   Physical Exam  Vitals and nursing note reviewed. Constitutional:       Appearance: She is obese. Cardiovascular:      Rate and Rhythm: Normal rate and regular rhythm. Pulses: Normal pulses. Heart sounds: Normal heart sounds. Pulmonary:      Breath sounds: Normal breath sounds. Musculoskeletal:      Right lower leg: Edema present. Left lower leg: Edema present. Comments: 2+ pitting edema to mid calves bilaterally   Skin:     General: Skin is warm. Neurological:      Mental Status: She is alert and oriented to person, place, and time. Assessment:      BP (!) 159/68 (Site: Right Upper Arm, Position: Sitting, Cuff Size: Large Adult)   Pulse 63   Temp 98.5 °F (36.9 °C) (Temporal)   Ht 5' 4\" (1.626 m)   Wt 260 lb (117.9 kg)   Breastfeeding No   BMI 44.63 kg/m²       Plan:      1. Essential hypertension, benign  -     furosemide (LASIX) 20 MG tablet; Take 1 tablet by mouth daily, Disp-30 tablet, R-1Normal  -     Basic Metabolic Panel; Future  2. Edema of both lower extremities  -     furosemide (LASIX) 20 MG tablet; Take 1 tablet by mouth daily, Disp-30 tablet, R-1Normal     will stop hygroton change to lasix due to swelling checking bmp continue all other BP med's as ordered. Requested information on nephrology consult again.  Follow up in 2 weeks IF she can check BP and get labs at [facility] can be done virtually to save her gas and time  She has had this swelling in the past with stopping lisinopril and once on norvasc     Candelaria Donovan, MOOK - NP

## 2022-06-02 ENCOUNTER — TELEPHONE (OUTPATIENT)
Dept: FAMILY MEDICINE CLINIC | Facility: CLINIC | Age: 72
End: 2022-06-02

## 2022-06-02 NOTE — TELEPHONE ENCOUNTER
----- Message from MOOK Cisse NP sent at 6/2/2022  8:00 AM EDT -----  K or potassium now normal (GOOD) BUN lower ( good) Creatinine lower but still not normal ( improved) I you do not hear about a kidney Md apt by Monday message tomas so I can bug someone again.  Continue med's as we discussed and  am sorry to keep having you come back

## 2022-06-15 ENCOUNTER — OFFICE VISIT (OUTPATIENT)
Dept: FAMILY MEDICINE CLINIC | Facility: CLINIC | Age: 72
End: 2022-06-15
Payer: MEDICARE

## 2022-06-15 VITALS
TEMPERATURE: 98.7 F | WEIGHT: 265 LBS | HEART RATE: 81 BPM | SYSTOLIC BLOOD PRESSURE: 154 MMHG | DIASTOLIC BLOOD PRESSURE: 71 MMHG | BODY MASS INDEX: 45.24 KG/M2 | HEIGHT: 64 IN

## 2022-06-15 DIAGNOSIS — R60.0 EDEMA OF BOTH LOWER EXTREMITIES: ICD-10-CM

## 2022-06-15 DIAGNOSIS — I10 ESSENTIAL HYPERTENSION, BENIGN: ICD-10-CM

## 2022-06-15 LAB
ANION GAP SERPL CALC-SCNC: 9 MMOL/L (ref 7–16)
BUN SERPL-MCNC: 26 MG/DL (ref 8–23)
CALCIUM SERPL-MCNC: 9.6 MG/DL (ref 8.3–10.4)
CHLORIDE SERPL-SCNC: 106 MMOL/L (ref 98–107)
CO2 SERPL-SCNC: 25 MMOL/L (ref 21–32)
CREAT SERPL-MCNC: 1.3 MG/DL (ref 0.6–1)
GLUCOSE SERPL-MCNC: 181 MG/DL (ref 65–100)
POTASSIUM SERPL-SCNC: 5.2 MMOL/L (ref 3.5–5.1)
SODIUM SERPL-SCNC: 140 MMOL/L (ref 136–145)

## 2022-06-15 PROCEDURE — 99214 OFFICE O/P EST MOD 30 MIN: CPT | Performed by: NURSE PRACTITIONER

## 2022-06-15 PROCEDURE — G8399 PT W/DXA RESULTS DOCUMENT: HCPCS | Performed by: NURSE PRACTITIONER

## 2022-06-15 PROCEDURE — 1036F TOBACCO NON-USER: CPT | Performed by: NURSE PRACTITIONER

## 2022-06-15 PROCEDURE — 1090F PRES/ABSN URINE INCON ASSESS: CPT | Performed by: NURSE PRACTITIONER

## 2022-06-15 PROCEDURE — G8427 DOCREV CUR MEDS BY ELIG CLIN: HCPCS | Performed by: NURSE PRACTITIONER

## 2022-06-15 PROCEDURE — 3017F COLORECTAL CA SCREEN DOC REV: CPT | Performed by: NURSE PRACTITIONER

## 2022-06-15 PROCEDURE — 1123F ACP DISCUSS/DSCN MKR DOCD: CPT | Performed by: NURSE PRACTITIONER

## 2022-06-15 PROCEDURE — G8417 CALC BMI ABV UP PARAM F/U: HCPCS | Performed by: NURSE PRACTITIONER

## 2022-06-15 RX ORDER — FUROSEMIDE 40 MG/1
20 TABLET ORAL DAILY
Qty: 90 TABLET | Refills: 1 | Status: SHIPPED | OUTPATIENT
Start: 2022-06-15 | End: 2022-10-21 | Stop reason: SDUPTHER

## 2022-06-15 ASSESSMENT — PATIENT HEALTH QUESTIONNAIRE - PHQ9
SUM OF ALL RESPONSES TO PHQ QUESTIONS 1-9: 0
1. LITTLE INTEREST OR PLEASURE IN DOING THINGS: 0
2. FEELING DOWN, DEPRESSED OR HOPELESS: 0
SUM OF ALL RESPONSES TO PHQ9 QUESTIONS 1 & 2: 0
SUM OF ALL RESPONSES TO PHQ QUESTIONS 1-9: 0

## 2022-06-15 ASSESSMENT — ENCOUNTER SYMPTOMS: SHORTNESS OF BREATH: 1

## 2022-06-15 NOTE — PROGRESS NOTES
Subjective:      Patient ID: Reyes Narayan is a 73 y.o. female. Here for follow up for bp. New med not working and has lots of swelling in her feet and legs. Some shortness of breath with exertion. Is able to lie flat at night. NO chest pain   has upcoming apt with nephrologist on Friday  Her leg swelling is making her miserable  HPI    Review of Systems   Respiratory: Positive for shortness of breath. Cardiovascular: Positive for leg swelling. Objective:   Physical Exam  Vitals and nursing note reviewed. Constitutional:       Appearance: Normal appearance. She is obese. Cardiovascular:      Rate and Rhythm: Normal rate and regular rhythm. Pulses: Normal pulses. Heart sounds: Murmur (2/6 eugenie) heard. Pulmonary:      Effort: Pulmonary effort is normal.      Breath sounds: Normal breath sounds. Musculoskeletal:         General: Swelling (swelling to knees bilateral ) present. Skin:     General: Skin is warm and dry. Neurological:      Mental Status: She is alert. Psychiatric:         Mood and Affect: Mood normal.         Behavior: Behavior normal.         Assessment:      BP (!) 154/71 (Site: Right Upper Arm, Position: Sitting, Cuff Size: Large Adult)   Pulse 81   Temp 98.7 °F (37.1 °C) (Temporal)   Ht 5' 4\" (1.626 m)   Wt 265 lb (120.2 kg)   Breastfeeding No   BMI 45.49 kg/m²       Plan:      1. Essential hypertension, benign  -     furosemide (LASIX) 40 MG tablet; Take 0.5 tablets by mouth daily, Disp-90 tablet, R-1Normal  -     Basic Metabolic Panel; Future  2. Edema of both lower extremities  -     furosemide (LASIX) 40 MG tablet;  Take 0.5 tablets by mouth daily, Disp-90 tablet, R-1Normal      Discussed pt with Dr Wu Davies DO will increase lasix to 40 daily Rechecking bmp Pt wishes to resume lisinopril but K was up over 6 so will defer to nephrology for this    MOOK Lennon - KEMI

## 2022-06-16 ENCOUNTER — TELEPHONE (OUTPATIENT)
Dept: FAMILY MEDICINE CLINIC | Facility: CLINIC | Age: 72
End: 2022-06-16

## 2022-06-16 NOTE — TELEPHONE ENCOUNTER
----- Message from MOOK Del Cid NP sent at 6/16/2022  7:53 AM EDT -----  Labs back and are about the same.  Ask if swelling is better with increase in lasix

## 2022-07-29 RX ORDER — HYDRALAZINE HYDROCHLORIDE 50 MG/1
TABLET, FILM COATED ORAL
Qty: 270 TABLET | Refills: 1 | Status: SHIPPED | OUTPATIENT
Start: 2022-07-29 | End: 2022-10-21 | Stop reason: SDUPTHER

## 2022-09-01 RX ORDER — METOPROLOL SUCCINATE 50 MG/1
50 TABLET, EXTENDED RELEASE ORAL DAILY
Qty: 90 TABLET | Refills: 0 | Status: SHIPPED | OUTPATIENT
Start: 2022-09-01 | End: 2022-10-21 | Stop reason: SDUPTHER

## 2022-09-01 NOTE — TELEPHONE ENCOUNTER
Pt called and left a message about needing refills on her metoprolol. She isn't due back until November to follow up.

## 2022-09-06 ENCOUNTER — OFFICE VISIT (OUTPATIENT)
Dept: ENDOCRINOLOGY | Age: 72
End: 2022-09-06
Payer: MEDICARE

## 2022-09-06 VITALS — BODY MASS INDEX: 43.77 KG/M2 | SYSTOLIC BLOOD PRESSURE: 120 MMHG | DIASTOLIC BLOOD PRESSURE: 58 MMHG | WEIGHT: 255 LBS

## 2022-09-06 DIAGNOSIS — E78.2 MIXED HYPERLIPIDEMIA: ICD-10-CM

## 2022-09-06 DIAGNOSIS — Z79.4 TYPE 2 DIABETES MELLITUS WITH HYPERGLYCEMIA, WITH LONG-TERM CURRENT USE OF INSULIN (HCC): Primary | ICD-10-CM

## 2022-09-06 DIAGNOSIS — N18.30 STAGE 3 CHRONIC KIDNEY DISEASE, UNSPECIFIED WHETHER STAGE 3A OR 3B CKD (HCC): ICD-10-CM

## 2022-09-06 DIAGNOSIS — E11.65 TYPE 2 DIABETES MELLITUS WITH HYPERGLYCEMIA, WITH LONG-TERM CURRENT USE OF INSULIN (HCC): Primary | ICD-10-CM

## 2022-09-06 DIAGNOSIS — I10 ESSENTIAL HYPERTENSION, BENIGN: ICD-10-CM

## 2022-09-06 LAB — HBA1C MFR BLD: 7.2 %

## 2022-09-06 PROCEDURE — 3046F HEMOGLOBIN A1C LEVEL >9.0%: CPT | Performed by: PHYSICIAN ASSISTANT

## 2022-09-06 PROCEDURE — G8427 DOCREV CUR MEDS BY ELIG CLIN: HCPCS | Performed by: PHYSICIAN ASSISTANT

## 2022-09-06 PROCEDURE — 1090F PRES/ABSN URINE INCON ASSESS: CPT | Performed by: PHYSICIAN ASSISTANT

## 2022-09-06 PROCEDURE — 1123F ACP DISCUSS/DSCN MKR DOCD: CPT | Performed by: PHYSICIAN ASSISTANT

## 2022-09-06 PROCEDURE — 1036F TOBACCO NON-USER: CPT | Performed by: PHYSICIAN ASSISTANT

## 2022-09-06 PROCEDURE — 2022F DILAT RTA XM EVC RTNOPTHY: CPT | Performed by: PHYSICIAN ASSISTANT

## 2022-09-06 PROCEDURE — 99214 OFFICE O/P EST MOD 30 MIN: CPT | Performed by: PHYSICIAN ASSISTANT

## 2022-09-06 PROCEDURE — 3017F COLORECTAL CA SCREEN DOC REV: CPT | Performed by: PHYSICIAN ASSISTANT

## 2022-09-06 PROCEDURE — G8417 CALC BMI ABV UP PARAM F/U: HCPCS | Performed by: PHYSICIAN ASSISTANT

## 2022-09-06 PROCEDURE — 83036 HEMOGLOBIN GLYCOSYLATED A1C: CPT | Performed by: PHYSICIAN ASSISTANT

## 2022-09-06 PROCEDURE — G8399 PT W/DXA RESULTS DOCUMENT: HCPCS | Performed by: PHYSICIAN ASSISTANT

## 2022-09-06 RX ORDER — LISINOPRIL 10 MG/1
10 TABLET ORAL DAILY
COMMUNITY
End: 2022-10-21 | Stop reason: CLARIF

## 2022-09-06 RX ORDER — CHLORTHALIDONE 25 MG/1
12.5 TABLET ORAL DAILY
COMMUNITY
End: 2022-10-21 | Stop reason: SDUPTHER

## 2022-09-06 NOTE — PROGRESS NOTES
[facility]    Ashwin Han PA-C  [facility]  [address], [address]  [place], [place]  Phone [phone]          Jw Agarwal is a 81 y.o. female seen 9/6/2022 for follow up evaluation of type 2 diabetes. Assessment and Plan:    In office COVID-19 PPE worn and precautions taken    1. Type 2 diabetes mellitus with hyperglycemia, with long-term current use of insulin ([address]  2 diabetes exhibiting improved glycemic control with addition of Jardiance. Now with hyperglycemia. Previously on basal bolus insulin only secondary to cost.  Decrease NPH in the evening from 40 units down to 35 units due to overnight hypoglycemia. Continue 10 mg of Jardiance in the morning. Ensure patient is using correction scale not only at bedtime but also at the 4 before's adjusting mealtime insulin with correction scale for best results. Patient verbalizes understanding. May be a great candidate for CGM therapy        The beneficiary requires a therapeutic CGM for treatment and management of diabetes mellitus. The beneficiary has been using a home blood glucose monitor and performing frequent  blood glucose testing. The beneficiary is insulin treated with 3 or more daily injections of insulin or a continuous subcutaneous insulin infusion pump. The beneficiary's insulin treatment regimen requires frequent adjustments by the beneficiary on the basis of therapeutic CGM testing results. - AMB POC HEMOGLOBIN A1C  - insulin NPH (HUMULIN N;NOVOLIN N) 100 UNIT/ML injection vial; Inject 40 Units into the skin every morning (before breakfast) AND 35 Units daily (before dinner). Dispense: 70 mL; Refill: 3    2. Essential hypertension, benign  BP Readings from Last 3 Encounters:   09/06/22 (!) 120/58   06/15/22 (!) 154/71   06/01/22 (!) 159/68         3.  Mixed hyperlipidemia  Last LDL at goal May 2022 on atorvastatin 40mg  Lab Results   Component Value Date    LDLCALC 55 05/05/2022         4. Stage 3 chronic kidney disease, unspecified whether stage 3a or 3b CKD ([facility]  Now on jardiance, under care of nephro, Recent DX of RCC    5. BMI 40.0-44.9, adult [facility])  Patient would certainly benefit from improved glycemic control with agents that are either weight neutral or confer weight loss as a side effect. The beneficiary requires a therapeutic CGM for treatment and management of diabetes mellitus. The beneficiary has been using a home blood glucose monitor and performing frequent  blood glucose testing. The beneficiary is insulin treated with 3 or more daily injections of insulin or a continuous subcutaneous insulin infusion pump. The beneficiary's insulin treatment regimen requires frequent adjustments by the beneficiary on the basis of therapeutic CGM testing results. Oliver Sommer was seen today for diabetes. Diagnoses and all orders for this visit:    Type 2 diabetes mellitus with hyperglycemia, with long-term current use of insulin ([facility])  -     AMB POC HEMOGLOBIN A1C  -     insulin NPH (HUMULIN N;NOVOLIN N) 100 UNIT/ML injection vial; Inject 40 Units into the skin every morning (before breakfast) AND 35 Units daily (before dinner). Essential hypertension, benign    Mixed hyperlipidemia    Stage 3 chronic kidney disease, unspecified whether stage 3a or 3b CKD ([facility])    BMI 40.0-44.9, adult ([facility] [address]          History of Present Illness:    9/6/2022  Pt RTC for follow up. DX with left RCC by maci. started jardiance approx 3 months ago with nephrology.  DX with left RCC plans for nephrectomy Sept 20th       Current Regimen: Jardiance 10mg, metformin XR 500mg BID, Novolin N 40 BID, Novolin R 20/30/25 units supper plus 1/20>150 correction      5/5/2022   Patient returns clinic for follow-up of uncontrolled type 2 diabetes on basal bolus insulin regimen with Novolin N and R if secondary to cost plus max tolerated dose of metformin. Patient was diagnosed with a second recurrence of breast cancer in the  contralateral breast since her last visit and received multiple radiation treatments and lumpectomy with lymph node removal.  Is typically checking her blood sugar about twice a day before breakfast and before supper, not using correction scale unless  she is checking at bedtime. Reports hypoglycemia overnight several months ago but not in the last 30 days             11/4/2021   Patient returns clinic for follow-up of type 2 diabetes on NPH and are basal bolus insulin regimen plus Metformin. This year suffered from diagnosis of breast cancer with lumpectomy and radiation with subsequent soft tissue infection. A1c is near  goal.  Minimal hypoglycemia appreciated. Unfortunately, Time on meter was inaccurate leading to data discrepancy                 2/25/2021   Patient presents for follow-up 2 diabetes on basal bolus insulin regimen plus Metformin. Patient has historically not been able to afford noninsulin therapies. Reports new blood pressure medication, Bystolic was also unaffordable greater than  $400 for 7 pills. She was encouraged to reach out to prescribing provider to discuss this I am concerned about high deductible and new Medicare year. Tolerating basal bolus insulin regimen without hypoglycemia, some postprandial hyperglycemia. Activity limited by chronic hip pain                               DIABETES MELLITUS   Raymon Chaudhari is here for follow up evaluation and treatment of worsening type 2 diabetes mellitus. Review of Records: pt referred by  PCP        Date of diagnosis: 1981, dx with gestational diabetes   Started on pills alone       Started on insulin in 5803-1102       10/22/2018   Pt RTC for f/u, was unable to afford insulin after last visit and was started on NPH 30units BID and Novolin R 15 units TIDAC plus 2/50>150 correction.   She reports that she was  experiencing hypoglycemia after dinner when taking NPH and Novolin R at dinnertime so she is taking NPH with her supper and Novolin R several hours later           01/31/2019   Patient returns clinic for follow-up of uncontrolled type 2 diabetes currently on basal bolus insulin with NPH and R due to cost of medication. At last visit she was taking NPH  at dinnertime and are several hours after dinner. This was leading to lower blood sugars at bedtime and higher blood sugars in the morning. At last visit we asked her to reverse that so she was taking R before supper and NPH 12 hours after AM  NPH, in the evenings but it seems she did not remember to make this change. Patient has an appointment for knee surgery April 16 06/18/2019   Patient with a history of type 2 diabetes previously near goal on NPH and R basal bolus regimen plus metformin, on these agents due to cost.  Patient had a goal of greatly reducing  her uncontrolled hemoglobin A1c so that she could have knee replacement. She completed knee replacement in the interim since last office visit had a change in her basal bolus regimen due to change in diet and activity. 12/18/2019   Pt RTC for f/u, s/p abscess that delayed her right knee surgery. Pt admits to worsening glycemic control associated with decreased activity       8/3/2020   VIRTUAL VISIT   Zachary Black is a 78 y.o. female who was seen by synchronous (real-time) audio-video technology on 8/3/2020 . The patient provided consent for this audio-video interaction. The [employer] platform was used. Patient and provider were located at their individual homes. Patient remains on basal bolus insulin regimen plus metformin. She would likely benefit from non-insulin therapies however cost is absolutely prohibitive. She is doing well  without hypoglycemia. Reports she takes Novolin N at bedtime.        11/3/2020   VIRTUAL VISIT   Zachary Black is a 78 y.o. female who was seen by synchronous (real-time) audio-video technology on 11/3/2020 . The patient provided consent for this audio-video interaction. The [employer] platform was used. Patient was located at their home and provider in the office. History obtained from patient and        Diet: 3 meals a day, large portions, high in carbs, out occasional, ice cream, unsweet tea, no ETOH       Exercise:  Limited activity due to knee       Notes increase in blood glucose with time       Body weight trend: decreasing steadily, 40lb in past year, unintentional weight loss      Wt Readings from Last 3 Encounters:   09/06/22 255 lb (115.7 kg)   06/15/22 265 lb (120.2 kg)   06/01/22 260 lb (117.9 kg)                    Wt Readings from Last 3 Encounters:        11/04/21  256 lb (116.1 kg)     04/16/21  240 lb (108.9 kg)     02/25/21  254 lb (115.2 kg)                                                      Wt Readings from Last 3 Encounters:        02/05/20  244 lb (110.7 kg)     01/23/20  244 lb (110.7 kg)     12/18/19  240 lb (108.9 kg)             Pregnancy: tubal ligation       Diabetes education: The patient has received formal diabetes education. completed 2018       Diabetic complications:                    Retinopathy : Last eye exam was spring 2022 and demonstrated no diabetic retinopathy.   Eye care specialist is [facility]                      Albuminuria/nephropathy :                               07/24/2017       microalbumin/Cr ratio 29.3                           06/06/2018      Cr 1.05, GFR 56                           08/27/2018      Cr 1.53, GFR 36                                    09/18/2018      Cr 1.32, GFR 42, microalbumin/Cr ratio 9.8                           10/22/2018      Cr 1.40, GFR 40                           06/18/2019      Cr 0.99, GFR 60, microalbumin/Cr ratio 7.8                           08/12/2020      Cr 1.09, GFR 52, microalbumin/Cr ratio 28                           10/12/2021      Cr 1.77, GFR 29                          01/25/2022      Cr 1.55, GFR 34                                                                                     Neuropathy :  No symptoms reported         Home blood glucose monitoring frequency: 2 times per day    By review of meter download over past 30 days  Average blood glucose 160 ± 70  Range      Typical Standard Deviation   Fasting 171 40   AC lunch     AC supper 175 88   Bedtime 81 50     Blood glucose levels are uncontrolled, 7% hypoglycemia, usually overnight       Hypoglycemia: frequent, hypoglycemic aware below 100 Symptoms include shaky       Hyperglycemia: Frequency daily, without symptoms        Hemoglobin A1c:   07/24/2017      9.9%   12/12/2017      9.9%   06/06/2018      11.0%   09/18/2018      12.9%   12/18/2018      7.4%   05/24/2019      7.1%   12/18/2019      7.2%   01/23/2020      8.2%   08/12/2020      8.1%   11/24/2020      8.1%   02/25/2021      7.9%   11/04/2021      7.1%    05/05/2022    7.8%  09/06/2022 7.2%                                               Other pertinent labs:               Lipids :                            06/06/2018  TC- 157, LDL- 61, VLDL- 49,  HDL- 47, TG- 247                           12/12/2018  TC- 125, LDL- 40, VLDL- 42,  HDL- 43, TG- 211                           06/05/2019  TC- 111, LDL- 27, VLDL- 40,  HDL- 44, TG- 201                           changed from crestor to atorvastatin secondary to cost                           11/24/2020  TC- 117, LDL- 46,  VLDL- 28,  HDL- 43, TG- 169                           05/26/2021  TC- 129, LDL- 53, VLDL- 33,  HDL- 43, TG- 202                                                          Thyroid :                    09/18/2018      1.320               06/18/2019      0.557               08/12/2020      2.240   05/05/2022 1.590            Allergies & Medications:  Reviewed in chart.         Review of Systems    Vital Signs:  BP (!) 120/58 (Site: Left Upper Arm, Position: Sitting)   Wt 255 lb

## 2022-09-09 ENCOUNTER — TELEPHONE (OUTPATIENT)
Dept: ENDOCRINOLOGY | Age: 72
End: 2022-09-09

## 2022-09-09 NOTE — TELEPHONE ENCOUNTER
----- Message from Patrice Barkley PA-C sent at 9/6/2022  8:58 AM EDT -----  Please to submit to DME for a linda 2 please

## 2022-09-29 ENCOUNTER — TELEPHONE (OUTPATIENT)
Dept: FAMILY MEDICINE CLINIC | Facility: CLINIC | Age: 72
End: 2022-09-29

## 2022-09-29 NOTE — TELEPHONE ENCOUNTER
Received a call from Home health PT requesting verbal orders for PT once a week for 8 weeks to work on strengthening and balance.

## 2022-10-21 ENCOUNTER — OFFICE VISIT (OUTPATIENT)
Dept: FAMILY MEDICINE CLINIC | Facility: CLINIC | Age: 72
End: 2022-10-21
Payer: MEDICARE

## 2022-10-21 VITALS
SYSTOLIC BLOOD PRESSURE: 132 MMHG | TEMPERATURE: 98 F | HEIGHT: 64 IN | HEART RATE: 69 BPM | DIASTOLIC BLOOD PRESSURE: 66 MMHG | WEIGHT: 248 LBS | BODY MASS INDEX: 42.34 KG/M2

## 2022-10-21 DIAGNOSIS — I10 ESSENTIAL HYPERTENSION, BENIGN: ICD-10-CM

## 2022-10-21 DIAGNOSIS — R60.0 EDEMA OF BOTH LOWER EXTREMITIES: ICD-10-CM

## 2022-10-21 PROCEDURE — G8417 CALC BMI ABV UP PARAM F/U: HCPCS | Performed by: NURSE PRACTITIONER

## 2022-10-21 PROCEDURE — 1123F ACP DISCUSS/DSCN MKR DOCD: CPT | Performed by: NURSE PRACTITIONER

## 2022-10-21 PROCEDURE — G8399 PT W/DXA RESULTS DOCUMENT: HCPCS | Performed by: NURSE PRACTITIONER

## 2022-10-21 PROCEDURE — 99214 OFFICE O/P EST MOD 30 MIN: CPT | Performed by: NURSE PRACTITIONER

## 2022-10-21 PROCEDURE — G8484 FLU IMMUNIZE NO ADMIN: HCPCS | Performed by: NURSE PRACTITIONER

## 2022-10-21 PROCEDURE — 3017F COLORECTAL CA SCREEN DOC REV: CPT | Performed by: NURSE PRACTITIONER

## 2022-10-21 PROCEDURE — 1036F TOBACCO NON-USER: CPT | Performed by: NURSE PRACTITIONER

## 2022-10-21 PROCEDURE — G8427 DOCREV CUR MEDS BY ELIG CLIN: HCPCS | Performed by: NURSE PRACTITIONER

## 2022-10-21 PROCEDURE — 1090F PRES/ABSN URINE INCON ASSESS: CPT | Performed by: NURSE PRACTITIONER

## 2022-10-21 RX ORDER — METOPROLOL SUCCINATE 50 MG/1
50 TABLET, EXTENDED RELEASE ORAL DAILY
Qty: 90 TABLET | Refills: 1 | Status: SHIPPED | OUTPATIENT
Start: 2022-10-21

## 2022-10-21 RX ORDER — DILTIAZEM HYDROCHLORIDE 300 MG/1
300 CAPSULE, EXTENDED RELEASE ORAL DAILY
Qty: 90 CAPSULE | Refills: 1 | Status: SHIPPED | OUTPATIENT
Start: 2022-10-21

## 2022-10-21 RX ORDER — LISINOPRIL 10 MG/1
10 TABLET ORAL DAILY
Qty: 90 TABLET | Refills: 1 | Status: CANCELLED | OUTPATIENT
Start: 2022-10-21

## 2022-10-21 RX ORDER — ATORVASTATIN CALCIUM 40 MG/1
40 TABLET, FILM COATED ORAL NIGHTLY
Qty: 90 TABLET | Refills: 1 | Status: SHIPPED | OUTPATIENT
Start: 2022-10-21

## 2022-10-21 RX ORDER — FUROSEMIDE 40 MG/1
20 TABLET ORAL DAILY
Qty: 90 TABLET | Refills: 1 | Status: SHIPPED | OUTPATIENT
Start: 2022-10-21

## 2022-10-21 RX ORDER — HYDRALAZINE HYDROCHLORIDE 50 MG/1
TABLET, FILM COATED ORAL
Qty: 270 TABLET | Refills: 1 | Status: SHIPPED | OUTPATIENT
Start: 2022-10-21

## 2022-10-21 RX ORDER — CHLORTHALIDONE 25 MG/1
12.5 TABLET ORAL DAILY
Qty: 45 TABLET | Refills: 1 | Status: SHIPPED | OUTPATIENT
Start: 2022-10-21

## 2022-10-21 SDOH — ECONOMIC STABILITY: FOOD INSECURITY: WITHIN THE PAST 12 MONTHS, THE FOOD YOU BOUGHT JUST DIDN'T LAST AND YOU DIDN'T HAVE MONEY TO GET MORE.: NEVER TRUE

## 2022-10-21 SDOH — ECONOMIC STABILITY: FOOD INSECURITY: WITHIN THE PAST 12 MONTHS, YOU WORRIED THAT YOUR FOOD WOULD RUN OUT BEFORE YOU GOT MONEY TO BUY MORE.: NEVER TRUE

## 2022-10-21 ASSESSMENT — PATIENT HEALTH QUESTIONNAIRE - PHQ9
SUM OF ALL RESPONSES TO PHQ QUESTIONS 1-9: 0
SUM OF ALL RESPONSES TO PHQ9 QUESTIONS 1 & 2: 0
SUM OF ALL RESPONSES TO PHQ QUESTIONS 1-9: 0
SUM OF ALL RESPONSES TO PHQ QUESTIONS 1-9: 0
1. LITTLE INTEREST OR PLEASURE IN DOING THINGS: 0
2. FEELING DOWN, DEPRESSED OR HOPELESS: 0
SUM OF ALL RESPONSES TO PHQ QUESTIONS 1-9: 0

## 2022-10-21 ASSESSMENT — SOCIAL DETERMINANTS OF HEALTH (SDOH): HOW HARD IS IT FOR YOU TO PAY FOR THE VERY BASICS LIKE FOOD, HOUSING, MEDICAL CARE, AND HEATING?: NOT HARD AT ALL

## 2022-10-21 NOTE — PROGRESS NOTES
Subjective:      Patient ID: Taylor Kapoor is a 81 y.o. female. Here for refills of med's for   HTN taking med's BP controlled  And cholesterol taking med daily   Since last visit has a left nephrectomy for a kidney cancer Seeing a nephrologist in follow up due to her CKD. Still has some lower extremity edema had to get 3 units of blood for anemia in the hospital after the surgery. Still tired but getting better  HPI    Review of Systems    Objective:   Physical Exam  Vitals and nursing note reviewed. Constitutional:       Appearance: She is obese. HENT:      Head: Normocephalic. Cardiovascular:      Rate and Rhythm: Normal rate and regular rhythm. Pulses: Normal pulses. Heart sounds: Normal heart sounds. Pulmonary:      Effort: Pulmonary effort is normal.      Breath sounds: Normal breath sounds. Musculoskeletal:         General: Swelling (bilateral lower extremitisy edmea to mid calves) present. Normal range of motion. Skin:     General: Skin is warm and dry. Capillary Refill: Capillary refill takes less than 2 seconds. Neurological:      General: No focal deficit present. Mental Status: She is alert and oriented to person, place, and time. Psychiatric:         Mood and Affect: Mood normal.         Behavior: Behavior normal.         Thought Content: Thought content normal.       Assessment:      /66 (Site: Left Upper Arm, Position: Sitting, Cuff Size: Large Adult)   Pulse 69   Temp 98 °F (36.7 °C) (Temporal)   Ht 5' 4\" (1.626 m)   Wt 248 lb (112.5 kg)   BMI 42.57 kg/m²        Plan:      1. Essential hypertension, benign  -     dilTIAZem (TIAZAC) 300 MG extended release capsule; Take 1 capsule by mouth daily, Disp-90 capsule, R-1Normal  -     atorvastatin (LIPITOR) 40 MG tablet; Take 1 tablet by mouth at bedtime, Disp-90 tablet, R-1Normal  -     chlorthalidone (HYGROTON) 25 MG tablet;  Take 0.5 tablets by mouth daily, Disp-45 tablet, R-1Normal  -     metoprolol succinate (TOPROL XL) 50 MG extended release tablet; Take 1 tablet by mouth daily, Disp-90 tablet, R-1Normal  -     hydrALAZINE (APRESOLINE) 50 MG tablet; Take 1 tablet by mouth three times a day, Disp-270 tablet, R-1Print  -     furosemide (LASIX) 40 MG tablet; Take 0.5 tablets by mouth daily, Disp-90 tablet, R-1Print  2. Edema of both lower extremities  -     furosemide (LASIX) 40 MG tablet; Take 0.5 tablets by mouth daily, Disp-90 tablet, R-1Print       Refilled current meds.  Will not do labs as just had in hospital. IS possible edema is due to persistant anemia but  has a follow up with nephro for this  MOOK Lau NP

## 2022-12-14 ENCOUNTER — OFFICE VISIT (OUTPATIENT)
Dept: ENDOCRINOLOGY | Age: 72
End: 2022-12-14
Payer: MEDICARE

## 2022-12-14 VITALS
BODY MASS INDEX: 42.74 KG/M2 | DIASTOLIC BLOOD PRESSURE: 70 MMHG | OXYGEN SATURATION: 96 % | WEIGHT: 249 LBS | SYSTOLIC BLOOD PRESSURE: 135 MMHG | HEART RATE: 70 BPM

## 2022-12-14 DIAGNOSIS — E11.65 TYPE 2 DIABETES MELLITUS WITH HYPERGLYCEMIA, WITH LONG-TERM CURRENT USE OF INSULIN (HCC): Primary | ICD-10-CM

## 2022-12-14 DIAGNOSIS — R60.0 EDEMA OF BOTH LOWER EXTREMITIES: ICD-10-CM

## 2022-12-14 DIAGNOSIS — E78.2 MIXED HYPERLIPIDEMIA: ICD-10-CM

## 2022-12-14 DIAGNOSIS — Z79.4 TYPE 2 DIABETES MELLITUS WITH HYPERGLYCEMIA, WITH LONG-TERM CURRENT USE OF INSULIN (HCC): Primary | ICD-10-CM

## 2022-12-14 DIAGNOSIS — I10 ESSENTIAL HYPERTENSION, BENIGN: ICD-10-CM

## 2022-12-14 DIAGNOSIS — N18.4 CKD (CHRONIC KIDNEY DISEASE) STAGE 4, GFR 15-29 ML/MIN (HCC): ICD-10-CM

## 2022-12-14 LAB — HBA1C MFR BLD: 7.5 %

## 2022-12-14 PROCEDURE — 3078F DIAST BP <80 MM HG: CPT | Performed by: PHYSICIAN ASSISTANT

## 2022-12-14 PROCEDURE — 3074F SYST BP LT 130 MM HG: CPT | Performed by: PHYSICIAN ASSISTANT

## 2022-12-14 PROCEDURE — 1123F ACP DISCUSS/DSCN MKR DOCD: CPT | Performed by: PHYSICIAN ASSISTANT

## 2022-12-14 PROCEDURE — 1090F PRES/ABSN URINE INCON ASSESS: CPT | Performed by: PHYSICIAN ASSISTANT

## 2022-12-14 PROCEDURE — 99214 OFFICE O/P EST MOD 30 MIN: CPT | Performed by: PHYSICIAN ASSISTANT

## 2022-12-14 PROCEDURE — 3046F HEMOGLOBIN A1C LEVEL >9.0%: CPT | Performed by: PHYSICIAN ASSISTANT

## 2022-12-14 PROCEDURE — 83036 HEMOGLOBIN GLYCOSYLATED A1C: CPT | Performed by: PHYSICIAN ASSISTANT

## 2022-12-14 PROCEDURE — 2022F DILAT RTA XM EVC RTNOPTHY: CPT | Performed by: PHYSICIAN ASSISTANT

## 2022-12-14 PROCEDURE — 1036F TOBACCO NON-USER: CPT | Performed by: PHYSICIAN ASSISTANT

## 2022-12-14 PROCEDURE — G8427 DOCREV CUR MEDS BY ELIG CLIN: HCPCS | Performed by: PHYSICIAN ASSISTANT

## 2022-12-14 PROCEDURE — G8417 CALC BMI ABV UP PARAM F/U: HCPCS | Performed by: PHYSICIAN ASSISTANT

## 2022-12-14 PROCEDURE — G8399 PT W/DXA RESULTS DOCUMENT: HCPCS | Performed by: PHYSICIAN ASSISTANT

## 2022-12-14 PROCEDURE — 3017F COLORECTAL CA SCREEN DOC REV: CPT | Performed by: PHYSICIAN ASSISTANT

## 2022-12-14 PROCEDURE — G8484 FLU IMMUNIZE NO ADMIN: HCPCS | Performed by: PHYSICIAN ASSISTANT

## 2022-12-14 NOTE — PROGRESS NOTES
[facility]    Maria Guadalupe Avendaño PA-C  [facility]  [address], [address]  [address] Fuentes[address] [place]  Phone [phone]  Facsimile [phone]          Mary Ragland is a 81 y.o. female seen 12/14/2022 for follow up evaluation of type 2 diabetes        Assessment and Plan:    In office COVID-19 PPE worn and precautions taken    1. Type 2 diabetes mellitus with hyperglycemia, with long-term current use of insulin ([address]  Patient with type 2 diabetes on basal bolus insulin regimen. Both Jardiance and metformin were stopped by nephrology as she has worsening CKD and recent nephrectomy due to malignant mass. On basal bolus insulin regimen patient is currently prescribed 35 units of NPH before breakfast and 40 before supper however she is taking 40 units twice daily at the appropriate times. I encouraged her to reduce this to 35 at both times. Can continue current prandial doses of insulin however patient encouraged to utilize her correction scale at the 4 before's not only at bedtime. Patient verbalizes understanding. We discussed how renal disease may impact her risk of hypoglycemia. She declines a prescription for glucagon. Pt to notify office if hypoglycemia persists      - AMB POC HEMOGLOBIN A1C  - insulin NPH (HUMULIN N;NOVOLIN N) 100 UNIT/ML injection vial; Inject 35 Units into the skin every morning (before breakfast) AND 35 Units daily (before dinner). Dispense: 70 mL; Refill: 3    2. Essential hypertension, benign  BP Readings from Last 3 Encounters:   12/14/22 135/70   10/21/22 132/66   09/06/22 (!) 120/58         3. Mixed hyperlipidemia  Last LDL at goal may 2022 on atorvastatin 40mg      4. CKD (chronic kidney disease) stage 4, GFR 15-29 ml/min ([facility])  Follow up with nephro, now off of ACE-I and SGLT2i per nephro    5.  Edema of both lower extremities  Encouraged leg elevation and low sodium suzanne          Angelo Acosta was seen today for diabetes. Diagnoses and all orders for this visit:    Type 2 diabetes mellitus with hyperglycemia, with long-term current use of insulin ([facility])  -     AMB POC HEMOGLOBIN A1C  -     insulin NPH (HUMULIN N;NOVOLIN N) 100 UNIT/ML injection vial; Inject 35 Units into the skin every morning (before breakfast) AND 35 Units daily (before dinner). Essential hypertension, benign    Mixed hyperlipidemia    CKD (chronic kidney disease) stage 4, GFR 15-29 ml/min ([facility])    Edema of both lower extremities          History of Present Illness:      12/14/2022   Interim diabetes HPI:    S/p major medical changes including nephrectomy  Reports multiple lows, as low as 45, typically overnight or with activity    Interim medical history changes:   S/p left nephrectomy secondary to malgnant mass  S/p bilateral knee replacement  S/p left groin abscess drainage    Lifestyle Update:  Adjusting to new diagnoses     Current Regimen: NPH 40 BID novolin R 20/30/25     Glucose data:    Home blood glucose monitoring frequency: 2 times per day    By review of meter download over past 30 days  Average blood glucose 172 ± 66      Time in range 53%      High 32%, Very High 12%      Low 0%, Very Low 3%     Typical    Fasting Low-mid 100 As low as 99   AC lunch 95, 162, 200 Rarely checking   AC supper Variable     Bedtime High 100-200      Blood glucose levels are uncontrolled, with hypoglycemia    Failed past therapies:   Taken off jardiance and metformin by nephrology    Relevant co morbidities:    Denies  HX pancreatitis, DKA, gastroparesis, foot ulcer    Optho:    Last eye exam was spring 2022 and demonstrated no diabetic retinopathy. Eye care specialist is [facility]   Obesity:         Body mass index is 42.74 kg/m².        fluctuating a bit      Wt Readings from Last 3 Encounters:   12/14/22 249 lb (112.9 kg)   10/21/22 248 lb (112.5 kg)   09/06/22 255 lb (115.7 kg) CardioVascular:    None    ? ?HTN?? Renal:    Under care of nephro? Dr. Chandana Whitney ESRD    OFF lisinopril by nephrology  This patient does not have an active medication from one of the medication groupers.                               07/24/2017       microalbumin/Cr ratio 29.3                           06/06/2018      Cr 1.05, GFR 56                           08/27/2018      Cr 1.53, GFR 36                                    09/18/2018      Cr 1.32, GFR 42, microalbumin/Cr ratio 9.8                           10/22/2018      Cr 1.40, GFR 40                           06/18/2019      Cr 0.99, GFR 60, microalbumin/Cr ratio 7.8                           08/12/2020      Cr 1.09, GFR 52, microalbumin/Cr ratio 28                           10/12/2021      Cr 1.77, GFR 29                          01/25/2022      Cr 1.55, GFR 34    11/10/222 Cr 2.16, GFR 24,     Lipids:     Current therapy atorvastatin - 40 MG with good compliance     06/06/2018  TC- 157, LDL- 61, VLDL- 49,  HDL- 47, TG- 247                           12/12/2018  TC- 125, LDL- 40, VLDL- 42,  HDL- 43, TG- 211                           06/05/2019  TC- 111, LDL- 27, VLDL- 40,  HDL- 44, TG- 201                           changed from crestor to atorvastatin secondary to cost                           11/24/2020  TC- 117, LDL- 46,  VLDL- 28,  HDL- 43, TG- 169                           05/26/2021  TC- 129, LDL- 53, VLDL- 33,  HDL- 43, TG- 202    05/05/2022  TC- 133, LDL- 55, VLDL- 47,  HDL- 43, TG- 216        Lab Results   Component Value Date    CHOL 133 05/05/2022    CHOL 127 11/18/2021    CHOL 122 05/27/2021     Lab Results   Component Value Date    LDLCALC 55 05/05/2022    LDLCALC 55 11/18/2021    LDLCALC 53 05/27/2021      Lab Results   Component Value Date    LABVLDL 47 (H) 12/18/2019    LABVLDL 40 06/05/2019    VLDL 35 05/05/2022    VLDL 31 11/18/2021    VLDL 28 05/27/2021      Lab Results   Component Value Date    HDL 43 05/05/2022    HDL 41 11/18/2021 HDL 41 05/27/2021      Lab Results   Component Value Date    HDL 43 05/05/2022    HDL 41 11/18/2021    HDL 41 05/27/2021      Lab Results   Component Value Date    TRIG 216 (H) 05/05/2022    TRIG 186 (H) 11/18/2021    TRIG 169 (H) 05/27/2021     No results found for: CHOLHDLRATIO      Hemoglobin A1c:   07/24/2017      9.9%   12/12/2017      9.9%   06/06/2018      11.0%   09/18/2018      12.9%   12/18/2018      7.4%   05/24/2019      7.1%   12/18/2019      7.2%   01/23/2020      8.2%   08/12/2020      8.1%   11/24/2020      8.1%   02/25/2021      7.9%   11/04/2021      7.1%    05/05/2022    7.8%  09/06/2022      7.2%  12/14/2022 7.5%  Hemoglobin A1C, POC   Date Value Ref Range Status   12/14/2022 7.5 % Final   09/06/2022 7.2 % Final   05/05/2022 7.8 % Final        Thyroid:   09/18/2018      1.320               06/18/2019      0.557               08/12/2020      2.240              05/05/2022      1.590      Lab Results   Component Value Date/Time    TSH 1.590 05/05/2022 09:20 AM    TSH 2.000 05/27/2021 08:17 AM    TSH 2.240 08/12/2020 11:19 AM                        Allergies & Medications:  Reviewed in chart. Review of Systems    Vital Signs:  /70 (Site: Right Upper Arm, Position: Sitting)   Pulse 70   Wt 249 lb (112.9 kg)   SpO2 96%   BMI 42.74 kg/m²       Physical Exam  Constitutional:       Appearance: Normal appearance. She is obese. HENT:      Head: Normocephalic. Neck:      Thyroid: No thyroid mass or thyromegaly. Vascular: No carotid bruit. Cardiovascular:      Rate and Rhythm: Normal rate and regular rhythm. Pulmonary:      Effort: Pulmonary effort is normal.      Breath sounds: Normal breath sounds. Abdominal:      Palpations: Abdomen is soft. Comments: Well healed surgical scar left lower flank just superior to left hip   Musculoskeletal:      Cervical back: Neck supple. Right lower leg: No edema. Left lower leg: No edema.    Feet:      Right foot: Protective Sensation: 3 sites tested. 3 sites sensed. Skin integrity: Skin integrity normal.      Left foot:      Protective Sensation: 3 sites tested. 3 sites sensed. Skin integrity: Skin integrity normal.   Lymphadenopathy:      Cervical: No cervical adenopathy. Skin:     General: Skin is warm and dry. Neurological:      General: No focal deficit present. Mental Status: She is alert. Sensory: Sensation is intact. Psychiatric:         Mood and Affect: Mood normal.         Behavior: Behavior normal.         Thought Content: Thought content normal.         Judgment: Judgment normal.           Return in about 16 weeks (around 4/5/2023). Portions of this note were generated with the assistance of voice recogniton software. As such, some errors in transcription may be present.

## 2022-12-14 NOTE — PATIENT INSTRUCTIONS
INSULIN ADMINISTRATION INSTRUCTIONS:    Patient Name:  Shane Peters   YOB: 1950    Provider Name:  Vito Mcdowell PA-C  Today's Date:  12/14/22      LONG-ACTING INSULIN     NPH    35 units twice daily    SHORT-ACTING INSULIN     Novolin R  20 before breakfast,  30 before lunch  25 before supper    Be sure to check blood glucose before meals and at bedtime. Based on the blood glucose reading, take the following amount of insulin:    Blood glucose 150 or less  No insulin  Blood glucose 151-170  1 units  Blood glucose 171-190  2 units  Blood glucose 191-210  3 units  Blood glucose 211-230  4 units  Blood glucose 231-250  5 units  Blood glucose 251-270  6 units  Blood glucose 271-290  7 units  Blood glucose 291-310  8 units  Blood glucose 311-330  9 units  Blood glucose 331-351  10 units  Blood glucose greater than 350 11 units      If there are questions, send an electronic [employer] message to for nonurgent questions or call the office at [phone].     [facility]  [address] [address]

## 2023-03-01 LAB — MAMMOGRAPHY, EXTERNAL: NORMAL

## 2023-03-10 ENCOUNTER — TELEPHONE (OUTPATIENT)
Dept: FAMILY MEDICINE CLINIC | Facility: CLINIC | Age: 73
End: 2023-03-10

## 2023-03-10 NOTE — TELEPHONE ENCOUNTER
Care Everywhere audit shows patient had a mammogram done 02/24/2022. Health maintenance has been updated.

## 2023-03-22 ENCOUNTER — OFFICE VISIT (OUTPATIENT)
Dept: FAMILY MEDICINE CLINIC | Facility: CLINIC | Age: 73
End: 2023-03-22
Payer: MEDICARE

## 2023-03-22 VITALS
BODY MASS INDEX: 42.17 KG/M2 | HEART RATE: 57 BPM | TEMPERATURE: 98.2 F | WEIGHT: 247 LBS | SYSTOLIC BLOOD PRESSURE: 162 MMHG | HEIGHT: 64 IN | DIASTOLIC BLOOD PRESSURE: 74 MMHG

## 2023-03-22 DIAGNOSIS — E78.5 HYPERLIPIDEMIA ASSOCIATED WITH TYPE 2 DIABETES MELLITUS (HCC): ICD-10-CM

## 2023-03-22 DIAGNOSIS — E11.69 HYPERLIPIDEMIA ASSOCIATED WITH TYPE 2 DIABETES MELLITUS (HCC): ICD-10-CM

## 2023-03-22 DIAGNOSIS — Z00.00 MEDICARE ANNUAL WELLNESS VISIT, SUBSEQUENT: Primary | ICD-10-CM

## 2023-03-22 DIAGNOSIS — I10 ESSENTIAL HYPERTENSION, BENIGN: ICD-10-CM

## 2023-03-22 DIAGNOSIS — E11.65 TYPE 2 DIABETES MELLITUS WITH HYPERGLYCEMIA, WITH LONG-TERM CURRENT USE OF INSULIN (HCC): ICD-10-CM

## 2023-03-22 DIAGNOSIS — R60.0 EDEMA OF BOTH LOWER EXTREMITIES: ICD-10-CM

## 2023-03-22 DIAGNOSIS — Z79.4 TYPE 2 DIABETES MELLITUS WITH HYPERGLYCEMIA, WITH LONG-TERM CURRENT USE OF INSULIN (HCC): ICD-10-CM

## 2023-03-22 DIAGNOSIS — N18.4 CKD (CHRONIC KIDNEY DISEASE) STAGE 4, GFR 15-29 ML/MIN (HCC): ICD-10-CM

## 2023-03-22 LAB
CHOLEST SERPL-MCNC: 146 MG/DL
HDLC SERPL-MCNC: 47 MG/DL (ref 40–60)
HDLC SERPL: 3.1
LDLC SERPL CALC-MCNC: 56.8 MG/DL
TRIGL SERPL-MCNC: 211 MG/DL (ref 35–150)
VLDLC SERPL CALC-MCNC: 42.2 MG/DL (ref 6–23)

## 2023-03-22 PROCEDURE — G8417 CALC BMI ABV UP PARAM F/U: HCPCS | Performed by: NURSE PRACTITIONER

## 2023-03-22 PROCEDURE — 3046F HEMOGLOBIN A1C LEVEL >9.0%: CPT | Performed by: NURSE PRACTITIONER

## 2023-03-22 PROCEDURE — 1123F ACP DISCUSS/DSCN MKR DOCD: CPT | Performed by: NURSE PRACTITIONER

## 2023-03-22 PROCEDURE — 3017F COLORECTAL CA SCREEN DOC REV: CPT | Performed by: NURSE PRACTITIONER

## 2023-03-22 PROCEDURE — G8399 PT W/DXA RESULTS DOCUMENT: HCPCS | Performed by: NURSE PRACTITIONER

## 2023-03-22 PROCEDURE — G8484 FLU IMMUNIZE NO ADMIN: HCPCS | Performed by: NURSE PRACTITIONER

## 2023-03-22 PROCEDURE — 1090F PRES/ABSN URINE INCON ASSESS: CPT | Performed by: NURSE PRACTITIONER

## 2023-03-22 PROCEDURE — 3077F SYST BP >= 140 MM HG: CPT | Performed by: NURSE PRACTITIONER

## 2023-03-22 PROCEDURE — 3078F DIAST BP <80 MM HG: CPT | Performed by: NURSE PRACTITIONER

## 2023-03-22 PROCEDURE — 2022F DILAT RTA XM EVC RTNOPTHY: CPT | Performed by: NURSE PRACTITIONER

## 2023-03-22 PROCEDURE — 99213 OFFICE O/P EST LOW 20 MIN: CPT | Performed by: NURSE PRACTITIONER

## 2023-03-22 PROCEDURE — G8427 DOCREV CUR MEDS BY ELIG CLIN: HCPCS | Performed by: NURSE PRACTITIONER

## 2023-03-22 PROCEDURE — 1036F TOBACCO NON-USER: CPT | Performed by: NURSE PRACTITIONER

## 2023-03-22 RX ORDER — CHLORTHALIDONE 25 MG/1
12.5 TABLET ORAL DAILY
Qty: 45 TABLET | Refills: 1 | Status: SHIPPED | OUTPATIENT
Start: 2023-03-22

## 2023-03-22 RX ORDER — ATORVASTATIN CALCIUM 40 MG/1
40 TABLET, FILM COATED ORAL NIGHTLY
Qty: 90 TABLET | Refills: 1 | Status: SHIPPED | OUTPATIENT
Start: 2023-03-22

## 2023-03-22 RX ORDER — CANAGLIFLOZIN 100 MG/1
1 TABLET, FILM COATED ORAL DAILY
Refills: 11 | COMMUNITY
Start: 2023-03-01 | End: 2023-03-22

## 2023-03-22 RX ORDER — LANCETS 33 GAUGE
EACH MISCELLANEOUS
COMMUNITY
Start: 2023-02-10

## 2023-03-22 RX ORDER — BLOOD SUGAR DIAGNOSTIC
STRIP MISCELLANEOUS
COMMUNITY
Start: 2023-01-23

## 2023-03-22 RX ORDER — ERGOCALCIFEROL 1.25 MG/1
CAPSULE ORAL
Refills: 3 | COMMUNITY
Start: 2023-01-25

## 2023-03-22 RX ORDER — FUROSEMIDE 40 MG/1
20 TABLET ORAL DAILY
Qty: 90 TABLET | Refills: 1 | Status: SHIPPED | OUTPATIENT
Start: 2023-03-22

## 2023-03-22 RX ORDER — LISINOPRIL 10 MG/1
TABLET ORAL
Refills: 11 | COMMUNITY
Start: 2023-03-01

## 2023-03-22 RX ORDER — HYDRALAZINE HYDROCHLORIDE 50 MG/1
TABLET, FILM COATED ORAL
Qty: 270 TABLET | Refills: 1 | Status: SHIPPED | OUTPATIENT
Start: 2023-03-22

## 2023-03-22 RX ORDER — METOPROLOL SUCCINATE 50 MG/1
50 TABLET, EXTENDED RELEASE ORAL DAILY
Qty: 90 TABLET | Refills: 1 | Status: SHIPPED | OUTPATIENT
Start: 2023-03-22

## 2023-03-22 RX ORDER — LISINOPRIL 10 MG/1
10 TABLET ORAL DAILY
COMMUNITY
Start: 2023-03-01

## 2023-03-22 SDOH — ECONOMIC STABILITY: HOUSING INSECURITY
IN THE LAST 12 MONTHS, WAS THERE A TIME WHEN YOU DID NOT HAVE A STEADY PLACE TO SLEEP OR SLEPT IN A SHELTER (INCLUDING NOW)?: NO

## 2023-03-22 SDOH — ECONOMIC STABILITY: FOOD INSECURITY: WITHIN THE PAST 12 MONTHS, YOU WORRIED THAT YOUR FOOD WOULD RUN OUT BEFORE YOU GOT MONEY TO BUY MORE.: NEVER TRUE

## 2023-03-22 SDOH — ECONOMIC STABILITY: FOOD INSECURITY: WITHIN THE PAST 12 MONTHS, THE FOOD YOU BOUGHT JUST DIDN'T LAST AND YOU DIDN'T HAVE MONEY TO GET MORE.: NEVER TRUE

## 2023-03-22 SDOH — ECONOMIC STABILITY: INCOME INSECURITY: HOW HARD IS IT FOR YOU TO PAY FOR THE VERY BASICS LIKE FOOD, HOUSING, MEDICAL CARE, AND HEATING?: NOT HARD AT ALL

## 2023-03-22 ASSESSMENT — VISUAL ACUITY
OS_CC: 20/30
OD_CC: 20/40

## 2023-03-22 ASSESSMENT — PATIENT HEALTH QUESTIONNAIRE - PHQ9
SUM OF ALL RESPONSES TO PHQ QUESTIONS 1-9: 0
SUM OF ALL RESPONSES TO PHQ QUESTIONS 1-9: 0
1. LITTLE INTEREST OR PLEASURE IN DOING THINGS: 0
SUM OF ALL RESPONSES TO PHQ QUESTIONS 1-9: 0
SUM OF ALL RESPONSES TO PHQ QUESTIONS 1-9: 0
2. FEELING DOWN, DEPRESSED OR HOPELESS: 0
SUM OF ALL RESPONSES TO PHQ9 QUESTIONS 1 & 2: 0

## 2023-03-22 ASSESSMENT — LIFESTYLE VARIABLES
HOW OFTEN DO YOU HAVE A DRINK CONTAINING ALCOHOL: NEVER
HOW MANY STANDARD DRINKS CONTAINING ALCOHOL DO YOU HAVE ON A TYPICAL DAY: PATIENT DOES NOT DRINK

## 2023-03-22 NOTE — PROGRESS NOTES
300 MG extended release capsule Take 1 capsule by mouth daily Yes MOOK Marcos - NP   atorvastatin (LIPITOR) 40 MG tablet Take 1 tablet by mouth at bedtime Yes MOOK Marcos - NP   chlorthalidone (HYGROTON) 25 MG tablet Take 0.5 tablets by mouth daily Yes MOOK Marcos - NP   metoprolol succinate (TOPROL XL) 50 MG extended release tablet Take 1 tablet by mouth daily Yes MOOK Marcos - NP   hydrALAZINE (APRESOLINE) 50 MG tablet Take 1 tablet by mouth three times a day Yes MOOK Marcos - NP   furosemide (LASIX) 40 MG tablet Take 0.5 tablets by mouth daily Yes MOOK Marcos NP   MAGNESIUM CHLORIDE PO Take by mouth daily Yes Ar Automatic Reconciliation   acetaminophen (TYLENOL) 650 MG extended release tablet Take 650 mg by mouth every 8 hours Yes Ar Automatic Reconciliation   anastrozole (ARIMIDEX) 1 MG tablet Take 1 mg by mouth daily Yes Gilberto Gandhi MD   aspirin 81 MG EC tablet Take 81 mg by mouth daily Yes Ar Automatic Reconciliation   insulin regular (HUMULIN R;NOVOLIN R) 100 UNIT/ML injection 20 u AC breakfast, 30 u AC lunch and 25u AC Supper 25u AC snack plus correction 1/20>150, max daily dose 60 units Yes Keny Olivares PA-C   melatonin 5 MG TABS tablet Take 10 mg by mouth Yes Ar Automatic Reconciliation       CareTeam (Including outside providers/suppliers regularly involved in providing care):   Patient Care Team:  MOOK Marcos NP as PCP - [address] [place], APRN - NP as PCP - [facility] Provider  Nephrologist Dr Wally Salomon  Urologist Dr Sheela Ryan oncologist Dr Schuyler Patel  Oncologist Dr Monroe Rater Dr Ashutosh Felix  Vascular surgery Dr Jaswinder Aguilar and updated this visit:  Tobacco  Allergies  Meds  Problems  Med Hx  Surg Hx  Soc Hx  Fam Hx               MOOK Marcos NP

## 2023-03-22 NOTE — PATIENT INSTRUCTIONS
tells your family and your doctor your wishes about life support and other treatment. The form is also called a declaration. Medical power of . This form lets you name a person to make treatment decisions for you when you can't speak for yourself. This person is called a health care agent (health care proxy, health care surrogate). The form is also called a durable power of  for health care. If you do not have an advance directive, decisions about your medical care may be made by a family member, or by a doctor or a  who doesn't know you. It may help to think of an advance directive as a gift to the people who care for you. If you have one, they won't have to make tough decisions by themselves. For more information, including forms for your state, see the [address] website (www.caringinfo.org/planning/advance-directives/). Follow-up care is a key part of your treatment and safety. Be sure to make and go to all appointments, and call your doctor if you are having problems. It's also a good idea to know your test results and keep a list of the medicines you take. What should you include in an advance directive? Many states have a unique advance directive form. (It may ask you to address specific issues.) Or you might use a universal form that's approved by many states. If your form doesn't tell you what to address, it may be hard to know what to include in your advance directive. Use the questions below to help you get started. Who do you want to make decisions about your medical care if you are not able to? What life-support measures do you want if you have a serious illness that gets worse over time or can't be cured? What are you most afraid of that might happen? (Maybe you're afraid of having pain, losing your independence, or being kept alive by machines.)  Where would you prefer to die? (Your home? A hospital? A nursing home?)  Do you want to donate your organs when you die?   Do

## 2023-03-23 ENCOUNTER — TELEPHONE (OUTPATIENT)
Dept: FAMILY MEDICINE CLINIC | Facility: CLINIC | Age: 73
End: 2023-03-23

## 2023-03-23 NOTE — TELEPHONE ENCOUNTER
----- Message from MOOK Polanco NP sent at 3/23/2023  7:24 AM EDT -----  Cholesterol is great Keep rolling!

## 2023-03-29 ENCOUNTER — HOSPITAL ENCOUNTER (OUTPATIENT)
Dept: ULTRASOUND IMAGING | Age: 73
Discharge: HOME | End: 2023-04-01

## 2023-03-29 ENCOUNTER — OFFICE VISIT (OUTPATIENT)
Dept: FAMILY MEDICINE CLINIC | Facility: CLINIC | Age: 73
End: 2023-03-29
Payer: MEDICARE

## 2023-03-29 VITALS
SYSTOLIC BLOOD PRESSURE: 145 MMHG | BODY MASS INDEX: 41.66 KG/M2 | HEIGHT: 64 IN | WEIGHT: 244 LBS | TEMPERATURE: 98 F | DIASTOLIC BLOOD PRESSURE: 66 MMHG | HEART RATE: 67 BPM

## 2023-03-29 DIAGNOSIS — I10 ESSENTIAL (PRIMARY) HYPERTENSION: ICD-10-CM

## 2023-03-29 DIAGNOSIS — L03.116 CELLULITIS OF LEFT LOWER EXTREMITY: ICD-10-CM

## 2023-03-29 DIAGNOSIS — L03.116 CELLULITIS OF LEFT LOWER EXTREMITY: Primary | ICD-10-CM

## 2023-03-29 LAB
BASOPHILS # BLD: 0.1 K/UL (ref 0–0.2)
BASOPHILS NFR BLD: 1 % (ref 0–2)
DIFFERENTIAL METHOD BLD: ABNORMAL
EOSINOPHIL # BLD: 0.6 K/UL (ref 0–0.8)
EOSINOPHIL NFR BLD: 3 % (ref 0.5–7.8)
ERYTHROCYTE [DISTWIDTH] IN BLOOD BY AUTOMATED COUNT: 14.7 % (ref 11.9–14.6)
HCT VFR BLD AUTO: 33.4 % (ref 35.8–46.3)
HGB BLD-MCNC: 10.4 G/DL (ref 11.7–15.4)
IMM GRANULOCYTES # BLD AUTO: 0.3 K/UL (ref 0–0.5)
IMM GRANULOCYTES NFR BLD AUTO: 2 % (ref 0–5)
LYMPHOCYTES # BLD: 2.3 K/UL (ref 0.5–4.6)
LYMPHOCYTES NFR BLD: 13 % (ref 13–44)
MCH RBC QN AUTO: 28 PG (ref 26.1–32.9)
MCHC RBC AUTO-ENTMCNC: 31.1 G/DL (ref 31.4–35)
MCV RBC AUTO: 89.8 FL (ref 82–102)
MONOCYTES # BLD: 1.5 K/UL (ref 0.1–1.3)
MONOCYTES NFR BLD: 9 % (ref 4–12)
NEUTS SEG # BLD: 12.4 K/UL (ref 1.7–8.2)
NEUTS SEG NFR BLD: 72 % (ref 43–78)
NRBC # BLD: 0 K/UL (ref 0–0.2)
PLATELET # BLD AUTO: 355 K/UL (ref 150–450)
PMV BLD AUTO: 9.9 FL (ref 9.4–12.3)
RBC # BLD AUTO: 3.72 M/UL (ref 4.05–5.2)
WBC # BLD AUTO: 17.1 K/UL (ref 4.3–11.1)

## 2023-03-29 PROCEDURE — 1036F TOBACCO NON-USER: CPT | Performed by: NURSE PRACTITIONER

## 2023-03-29 PROCEDURE — 3078F DIAST BP <80 MM HG: CPT | Performed by: NURSE PRACTITIONER

## 2023-03-29 PROCEDURE — 3077F SYST BP >= 140 MM HG: CPT | Performed by: NURSE PRACTITIONER

## 2023-03-29 PROCEDURE — 99214 OFFICE O/P EST MOD 30 MIN: CPT | Performed by: NURSE PRACTITIONER

## 2023-03-29 PROCEDURE — G8484 FLU IMMUNIZE NO ADMIN: HCPCS | Performed by: NURSE PRACTITIONER

## 2023-03-29 PROCEDURE — G8399 PT W/DXA RESULTS DOCUMENT: HCPCS | Performed by: NURSE PRACTITIONER

## 2023-03-29 PROCEDURE — G8427 DOCREV CUR MEDS BY ELIG CLIN: HCPCS | Performed by: NURSE PRACTITIONER

## 2023-03-29 PROCEDURE — 3017F COLORECTAL CA SCREEN DOC REV: CPT | Performed by: NURSE PRACTITIONER

## 2023-03-29 PROCEDURE — G8417 CALC BMI ABV UP PARAM F/U: HCPCS | Performed by: NURSE PRACTITIONER

## 2023-03-29 PROCEDURE — 1090F PRES/ABSN URINE INCON ASSESS: CPT | Performed by: NURSE PRACTITIONER

## 2023-03-29 PROCEDURE — 1123F ACP DISCUSS/DSCN MKR DOCD: CPT | Performed by: NURSE PRACTITIONER

## 2023-03-29 RX ORDER — DOXYCYCLINE HYCLATE 100 MG
100 TABLET ORAL 2 TIMES DAILY
Qty: 14 TABLET | Refills: 0 | Status: SHIPPED | OUTPATIENT
Start: 2023-03-29 | End: 2023-04-05

## 2023-03-29 RX ORDER — SULFAMETHOXAZOLE AND TRIMETHOPRIM 800; 160 MG/1; MG/1
1 TABLET ORAL 2 TIMES DAILY
Qty: 14 TABLET | Refills: 0 | Status: CANCELLED | OUTPATIENT
Start: 2023-03-29 | End: 2023-04-05

## 2023-03-29 RX ORDER — AMOXICILLIN AND CLAVULANATE POTASSIUM 875; 125 MG/1; MG/1
1 TABLET, FILM COATED ORAL 2 TIMES DAILY
Qty: 14 TABLET | Refills: 0 | Status: SHIPPED | OUTPATIENT
Start: 2023-03-29 | End: 2023-04-05

## 2023-03-29 ASSESSMENT — PATIENT HEALTH QUESTIONNAIRE - PHQ9
SUM OF ALL RESPONSES TO PHQ QUESTIONS 1-9: 0
1. LITTLE INTEREST OR PLEASURE IN DOING THINGS: 0
2. FEELING DOWN, DEPRESSED OR HOPELESS: 0
SUM OF ALL RESPONSES TO PHQ QUESTIONS 1-9: 0
SUM OF ALL RESPONSES TO PHQ9 QUESTIONS 1 & 2: 0

## 2023-03-29 ASSESSMENT — ENCOUNTER SYMPTOMS
COLOR CHANGE: 1
BLOOD IN STOOL: 0
CHEST TIGHTNESS: 0
COUGH: 0
SHORTNESS OF BREATH: 0

## 2023-03-29 NOTE — PROGRESS NOTES
Capillary refill takes less than 2 seconds. Findings: Erythema (LLE) present. Neurological:      General: No focal deficit present. Mental Status: She is alert and oriented to person, place, and time. Mental status is at baseline. Psychiatric:         Mood and Affect: Mood normal.         Behavior: Behavior normal.         Thought Content: Thought content normal.         Judgment: Judgment normal.        BP (!) 145/66 (Site: Right Upper Arm, Position: Sitting, Cuff Size: Large Adult)   Pulse 67   Temp 98 °F (36.7 °C) (Temporal)   Ht 5' 3.58\" (1.615 m)   Wt 244 lb (110.7 kg)   BMI 42.44 kg/m²     BP Readings from Last 3 Encounters:   03/29/23 (!) 145/66   03/22/23 (!) 162/74   12/14/22 135/70       An electronic signature was used to authenticate this note.     --Teo Plane

## 2023-03-30 ENCOUNTER — TELEPHONE (OUTPATIENT)
Dept: FAMILY MEDICINE CLINIC | Facility: CLINIC | Age: 73
End: 2023-03-30

## 2023-03-30 LAB
ALBUMIN SERPL-MCNC: 3 G/DL (ref 3.2–4.6)
ALBUMIN/GLOB SERPL: 0.8 (ref 0.4–1.6)
ALP SERPL-CCNC: 101 U/L (ref 50–136)
ALT SERPL-CCNC: 16 U/L (ref 12–65)
ANION GAP SERPL CALC-SCNC: 7 MMOL/L (ref 2–11)
AST SERPL-CCNC: 15 U/L (ref 15–37)
BILIRUB SERPL-MCNC: 0.3 MG/DL (ref 0.2–1.1)
BUN SERPL-MCNC: 62 MG/DL (ref 8–23)
CALCIUM SERPL-MCNC: 9.3 MG/DL (ref 8.3–10.4)
CHLORIDE SERPL-SCNC: 112 MMOL/L (ref 101–110)
CO2 SERPL-SCNC: 19 MMOL/L (ref 21–32)
CREAT SERPL-MCNC: 2.6 MG/DL (ref 0.6–1)
GLOBULIN SER CALC-MCNC: 3.8 G/DL (ref 2.8–4.5)
GLUCOSE SERPL-MCNC: 102 MG/DL (ref 65–100)
POTASSIUM SERPL-SCNC: 4.7 MMOL/L (ref 3.5–5.1)
PROT SERPL-MCNC: 6.8 G/DL (ref 6.3–8.2)
SODIUM SERPL-SCNC: 138 MMOL/L (ref 133–143)

## 2023-03-31 ENCOUNTER — OFFICE VISIT (OUTPATIENT)
Dept: FAMILY MEDICINE CLINIC | Facility: CLINIC | Age: 73
End: 2023-03-31
Payer: MEDICARE

## 2023-03-31 ENCOUNTER — OFFICE VISIT (OUTPATIENT)
Dept: ENDOCRINOLOGY | Age: 73
End: 2023-03-31
Payer: MEDICARE

## 2023-03-31 VITALS
SYSTOLIC BLOOD PRESSURE: 166 MMHG | HEIGHT: 64 IN | WEIGHT: 248.4 LBS | OXYGEN SATURATION: 99 % | HEART RATE: 77 BPM | BODY MASS INDEX: 42.41 KG/M2 | DIASTOLIC BLOOD PRESSURE: 60 MMHG

## 2023-03-31 VITALS
BODY MASS INDEX: 42.51 KG/M2 | TEMPERATURE: 98 F | DIASTOLIC BLOOD PRESSURE: 65 MMHG | HEIGHT: 64 IN | SYSTOLIC BLOOD PRESSURE: 168 MMHG | HEART RATE: 80 BPM | WEIGHT: 249 LBS

## 2023-03-31 DIAGNOSIS — Z79.4 TYPE 2 DIABETES MELLITUS WITH HYPERGLYCEMIA, WITH LONG-TERM CURRENT USE OF INSULIN (HCC): Primary | ICD-10-CM

## 2023-03-31 DIAGNOSIS — N18.4 CKD (CHRONIC KIDNEY DISEASE) STAGE 4, GFR 15-29 ML/MIN (HCC): ICD-10-CM

## 2023-03-31 DIAGNOSIS — L03.116 CELLULITIS OF LEFT LOWER EXTREMITY: ICD-10-CM

## 2023-03-31 DIAGNOSIS — I10 ESSENTIAL HYPERTENSION, BENIGN: ICD-10-CM

## 2023-03-31 DIAGNOSIS — L03.116 CELLULITIS OF LEFT LEG: ICD-10-CM

## 2023-03-31 DIAGNOSIS — E66.01 OBESITY, CLASS III, BMI 40-49.9 (MORBID OBESITY) (HCC): ICD-10-CM

## 2023-03-31 DIAGNOSIS — E11.65 TYPE 2 DIABETES MELLITUS WITH HYPERGLYCEMIA, WITH LONG-TERM CURRENT USE OF INSULIN (HCC): Primary | ICD-10-CM

## 2023-03-31 DIAGNOSIS — E78.2 MIXED HYPERLIPIDEMIA: ICD-10-CM

## 2023-03-31 DIAGNOSIS — L03.116 CELLULITIS OF LEFT LOWER EXTREMITY: Primary | ICD-10-CM

## 2023-03-31 PROCEDURE — 3078F DIAST BP <80 MM HG: CPT | Performed by: NURSE PRACTITIONER

## 2023-03-31 PROCEDURE — 3017F COLORECTAL CA SCREEN DOC REV: CPT | Performed by: NURSE PRACTITIONER

## 2023-03-31 PROCEDURE — 1090F PRES/ABSN URINE INCON ASSESS: CPT | Performed by: PHYSICIAN ASSISTANT

## 2023-03-31 PROCEDURE — 1036F TOBACCO NON-USER: CPT | Performed by: NURSE PRACTITIONER

## 2023-03-31 PROCEDURE — G8484 FLU IMMUNIZE NO ADMIN: HCPCS | Performed by: PHYSICIAN ASSISTANT

## 2023-03-31 PROCEDURE — 1036F TOBACCO NON-USER: CPT | Performed by: PHYSICIAN ASSISTANT

## 2023-03-31 PROCEDURE — 1123F ACP DISCUSS/DSCN MKR DOCD: CPT | Performed by: PHYSICIAN ASSISTANT

## 2023-03-31 PROCEDURE — 3077F SYST BP >= 140 MM HG: CPT | Performed by: PHYSICIAN ASSISTANT

## 2023-03-31 PROCEDURE — G8427 DOCREV CUR MEDS BY ELIG CLIN: HCPCS | Performed by: NURSE PRACTITIONER

## 2023-03-31 PROCEDURE — 3077F SYST BP >= 140 MM HG: CPT | Performed by: NURSE PRACTITIONER

## 2023-03-31 PROCEDURE — G8484 FLU IMMUNIZE NO ADMIN: HCPCS | Performed by: NURSE PRACTITIONER

## 2023-03-31 PROCEDURE — 99214 OFFICE O/P EST MOD 30 MIN: CPT | Performed by: NURSE PRACTITIONER

## 2023-03-31 PROCEDURE — 2022F DILAT RTA XM EVC RTNOPTHY: CPT | Performed by: PHYSICIAN ASSISTANT

## 2023-03-31 PROCEDURE — G8417 CALC BMI ABV UP PARAM F/U: HCPCS | Performed by: NURSE PRACTITIONER

## 2023-03-31 PROCEDURE — 3046F HEMOGLOBIN A1C LEVEL >9.0%: CPT | Performed by: PHYSICIAN ASSISTANT

## 2023-03-31 PROCEDURE — G8399 PT W/DXA RESULTS DOCUMENT: HCPCS | Performed by: PHYSICIAN ASSISTANT

## 2023-03-31 PROCEDURE — 99214 OFFICE O/P EST MOD 30 MIN: CPT | Performed by: PHYSICIAN ASSISTANT

## 2023-03-31 PROCEDURE — 1090F PRES/ABSN URINE INCON ASSESS: CPT | Performed by: NURSE PRACTITIONER

## 2023-03-31 PROCEDURE — 3017F COLORECTAL CA SCREEN DOC REV: CPT | Performed by: PHYSICIAN ASSISTANT

## 2023-03-31 PROCEDURE — 1123F ACP DISCUSS/DSCN MKR DOCD: CPT | Performed by: NURSE PRACTITIONER

## 2023-03-31 PROCEDURE — G8399 PT W/DXA RESULTS DOCUMENT: HCPCS | Performed by: NURSE PRACTITIONER

## 2023-03-31 PROCEDURE — G8427 DOCREV CUR MEDS BY ELIG CLIN: HCPCS | Performed by: PHYSICIAN ASSISTANT

## 2023-03-31 PROCEDURE — G8417 CALC BMI ABV UP PARAM F/U: HCPCS | Performed by: PHYSICIAN ASSISTANT

## 2023-03-31 PROCEDURE — 3078F DIAST BP <80 MM HG: CPT | Performed by: PHYSICIAN ASSISTANT

## 2023-03-31 RX ORDER — AMOXICILLIN 875 MG/1
875 TABLET, COATED ORAL 2 TIMES DAILY
COMMUNITY

## 2023-03-31 ASSESSMENT — PATIENT HEALTH QUESTIONNAIRE - PHQ9
SUM OF ALL RESPONSES TO PHQ QUESTIONS 1-9: 0
SUM OF ALL RESPONSES TO PHQ9 QUESTIONS 1 & 2: 0
2. FEELING DOWN, DEPRESSED OR HOPELESS: 0
1. LITTLE INTEREST OR PLEASURE IN DOING THINGS: 0
SUM OF ALL RESPONSES TO PHQ QUESTIONS 1-9: 0

## 2023-03-31 ASSESSMENT — ENCOUNTER SYMPTOMS: SHORTNESS OF BREATH: 0

## 2023-03-31 NOTE — PATIENT INSTRUCTIONS
provide one hot meal a day for older adults, delivered to their homes. Ask whether these meals are low-sodium. Let them know that you are on a low-sodium diet. Where can you learn more? Go to https://Scytlpevandana.KakKstati. org and sign in to your [employer] account. Enter A166 in the [facility] box to learn more about \"Limiting Sodium With Heart Failure: Care Instructions. \"     If you do not have an account, please click on the \"Sign Up Now\" link. Current as of: January 10, 2022               Content Version: 13.3  © 5078-3847 "[employer]. Care instructions adapted under license by [facility] ([facility]). If you have questions about a medical condition or this instruction, always ask your healthcare professional. [address] any warranty or liability for your use of this information. Patient Education        Low Sodium Diet (2,000 Milligram): Care Instructions  Overview     Limiting sodium can be an important part of managing some health problems. The most common source of sodium is salt. People get most of the salt in their diet from canned, prepared, and packaged foods. Fast food and restaurant meals also are very high in sodium. Your doctor will probably limit your sodium to less than 2,000 milligrams (mg) a day. This limit counts all the sodium inprepared and packaged foods and any salt you add to your food. Follow-up care is a key part of your treatment and safety. Be sure to make and go to all appointments, and call your doctor if you are having problems. It's also a good idea to know your test results and keep alist of the medicines you take. How can you care for yourself at home? Read food labels  Read labels on cans and food packages. The labels tell you how much sodium is in each serving. Make sure that you look at the serving size. If you eat more than the serving size, you have eaten more sodium.   Food labels also tell you the Percent Daily

## 2023-03-31 NOTE — PROGRESS NOTES
05/05/2022    7.8%  09/06/2022      7.2%  12/14/2022 7.5%  02/13/2023 8.0%  Hemoglobin A1C, POC   Date Value Ref Range Status   12/14/2022 7.5 % Final   09/06/2022 7.2 % Final   05/05/2022 7.8 % Final        Thyroid:   09/18/2018      1.320               06/18/2019      0.557               08/12/2020      2.240              05/05/2022      1.590      Lab Results   Component Value Date/Time    TSH 1.590 05/05/2022 09:20 AM    TSH 2.000 05/27/2021 08:17 AM    TSH 2.240 08/12/2020 11:19 AM                        Allergies & Medications:  Reviewed in chart. Review of Systems    Vital Signs:  BP (!) 166/60   Pulse 77   Ht 5' 3.58\" (1.615 m)   Wt 248 lb 6.4 oz (112.7 kg)   SpO2 99%   BMI 43.20 kg/m²       Physical Exam  Constitutional:       Appearance: Normal appearance. She is obese. HENT:      Head: Normocephalic. Neck:      Thyroid: No thyroid mass or thyromegaly. Vascular: No carotid bruit. Cardiovascular:      Rate and Rhythm: Normal rate and regular rhythm. Pulmonary:      Effort: Pulmonary effort is normal.      Breath sounds: Normal breath sounds. Abdominal:      Palpations: Abdomen is soft. Comments: Well healed surgical scar left lower flank just superior to left hip   Musculoskeletal:      Cervical back: Neck supple. Right lower leg: No edema. Left lower leg: No edema. Feet:      Right foot:      Protective Sensation: 3 sites tested. 3 sites sensed. Skin integrity: Skin integrity normal.      Left foot:      Protective Sensation: 3 sites tested. 3 sites sensed. Skin integrity: Skin integrity normal.   Lymphadenopathy:      Cervical: No cervical adenopathy. Skin:     General: Skin is warm and dry. Comments: Heat and erythema with edema without blistering Left lower extremity    Neurological:      General: No focal deficit present. Mental Status: She is alert. Sensory: Sensation is intact.    Psychiatric:         Mood and Affect: Mood

## 2023-04-03 ENCOUNTER — TELEPHONE (OUTPATIENT)
Dept: FAMILY MEDICINE CLINIC | Facility: CLINIC | Age: 73
End: 2023-04-03

## 2023-04-03 LAB
ALBUMIN SERPL-MCNC: 3 G/DL (ref 3.2–4.6)
ALBUMIN/GLOB SERPL: 0.7 (ref 0.4–1.6)
ALP SERPL-CCNC: 115 U/L (ref 50–136)
ALT SERPL-CCNC: 14 U/L (ref 12–65)
ANION GAP SERPL CALC-SCNC: 9 MMOL/L (ref 2–11)
AST SERPL-CCNC: 11 U/L (ref 15–37)
BASOPHILS # BLD: 0.1 K/UL (ref 0–0.2)
BASOPHILS NFR BLD: 1 % (ref 0–2)
BILIRUB SERPL-MCNC: 0.4 MG/DL (ref 0.2–1.1)
BUN SERPL-MCNC: 56 MG/DL (ref 8–23)
CALCIUM SERPL-MCNC: 9.3 MG/DL (ref 8.3–10.4)
CHLORIDE SERPL-SCNC: 111 MMOL/L (ref 101–110)
CO2 SERPL-SCNC: 19 MMOL/L (ref 21–32)
CREAT SERPL-MCNC: 2.6 MG/DL (ref 0.6–1)
DIFFERENTIAL METHOD BLD: ABNORMAL
EOSINOPHIL # BLD: 0.2 K/UL (ref 0–0.8)
EOSINOPHIL NFR BLD: 1 % (ref 0.5–7.8)
ERYTHROCYTE [DISTWIDTH] IN BLOOD BY AUTOMATED COUNT: 14.6 % (ref 11.9–14.6)
GLOBULIN SER CALC-MCNC: 4.1 G/DL (ref 2.8–4.5)
GLUCOSE SERPL-MCNC: 254 MG/DL (ref 65–100)
HCT VFR BLD AUTO: 33.4 % (ref 35.8–46.3)
HGB BLD-MCNC: 10.2 G/DL (ref 11.7–15.4)
IMM GRANULOCYTES # BLD AUTO: 0.5 K/UL (ref 0–0.5)
IMM GRANULOCYTES NFR BLD AUTO: 3 % (ref 0–5)
LYMPHOCYTES # BLD: 2 K/UL (ref 0.5–4.6)
LYMPHOCYTES NFR BLD: 12 % (ref 13–44)
MCH RBC QN AUTO: 27.8 PG (ref 26.1–32.9)
MCHC RBC AUTO-ENTMCNC: 30.5 G/DL (ref 31.4–35)
MCV RBC AUTO: 91 FL (ref 82–102)
MONOCYTES # BLD: 1 K/UL (ref 0.1–1.3)
MONOCYTES NFR BLD: 6 % (ref 4–12)
NEUTS SEG # BLD: 12.7 K/UL (ref 1.7–8.2)
NEUTS SEG NFR BLD: 77 % (ref 43–78)
NRBC # BLD: 0 K/UL (ref 0–0.2)
PLATELET # BLD AUTO: 442 K/UL (ref 150–450)
PMV BLD AUTO: 9.9 FL (ref 9.4–12.3)
POTASSIUM SERPL-SCNC: 5.2 MMOL/L (ref 3.5–5.1)
PROT SERPL-MCNC: 7.1 G/DL (ref 6.3–8.2)
RBC # BLD AUTO: 3.67 M/UL (ref 4.05–5.2)
SODIUM SERPL-SCNC: 139 MMOL/L (ref 133–143)
WBC # BLD AUTO: 16.4 K/UL (ref 4.3–11.1)

## 2023-04-07 ENCOUNTER — OFFICE VISIT (OUTPATIENT)
Dept: FAMILY MEDICINE CLINIC | Facility: CLINIC | Age: 73
End: 2023-04-07

## 2023-04-07 VITALS
HEIGHT: 64 IN | HEART RATE: 64 BPM | TEMPERATURE: 98 F | DIASTOLIC BLOOD PRESSURE: 65 MMHG | WEIGHT: 242 LBS | BODY MASS INDEX: 41.32 KG/M2 | SYSTOLIC BLOOD PRESSURE: 132 MMHG

## 2023-04-07 DIAGNOSIS — N18.4 CKD (CHRONIC KIDNEY DISEASE) STAGE 4, GFR 15-29 ML/MIN (HCC): ICD-10-CM

## 2023-04-07 DIAGNOSIS — E11.65 TYPE 2 DIABETES MELLITUS WITH HYPERGLYCEMIA, WITH LONG-TERM CURRENT USE OF INSULIN (HCC): ICD-10-CM

## 2023-04-07 DIAGNOSIS — Z09 HOSPITAL DISCHARGE FOLLOW-UP: Primary | ICD-10-CM

## 2023-04-07 DIAGNOSIS — Z79.4 TYPE 2 DIABETES MELLITUS WITH HYPERGLYCEMIA, WITH LONG-TERM CURRENT USE OF INSULIN (HCC): ICD-10-CM

## 2023-04-07 DIAGNOSIS — L03.116 CELLULITIS OF LEFT LOWER EXTREMITY: ICD-10-CM

## 2023-04-07 PROBLEM — D64.9 ANEMIA: Status: ACTIVE | Noted: 2023-04-03

## 2023-04-07 RX ORDER — OXYCODONE HYDROCHLORIDE 5 MG/1
5 TABLET ORAL EVERY 6 HOURS PRN
COMMUNITY
Start: 2023-04-05 | End: 2023-04-08

## 2023-04-07 RX ORDER — CEPHALEXIN 500 MG/1
500 CAPSULE ORAL 4 TIMES DAILY
Qty: 24 CAPSULE | Refills: 0 | COMMUNITY
Start: 2023-04-05 | End: 2023-04-11

## 2023-04-07 RX ORDER — DOXYCYCLINE 100 MG/1
100 CAPSULE ORAL 2 TIMES DAILY
COMMUNITY
Start: 2023-04-05 | End: 2023-04-11

## 2023-04-07 ASSESSMENT — ENCOUNTER SYMPTOMS
VOMITING: 0
NAUSEA: 0
ABDOMINAL PAIN: 0

## 2023-04-07 NOTE — PROGRESS NOTES
her leg to be sure continuing to improve so sorry she was unable to see her grandchild        Patricia WalkerMOOK - NP Post-Discharge Transitional Care Management Progress Note      Esther Aleman   YOB: 1950    Date of Office Visit:  4/7/2023  Date of Hospital Admission: 4/1/23  Date of Hospital Discharge: 4/6/23    Care management risk score Rising risk (score 2-5) and Complex Care (Scores >=6): No Risk Score On File     Non face to face  following discharge, date last encounter closed (first attempt may have been earlier): *No documented post hospital discharge outreach found in the last 14 days *No documented post hospital discharge outreach found in the last 14 days we talked with her in hospital during her admission call documented on 4/3/23    Call initiated 2 business days of discharge: *No response recorded in the last 14 days  Yes see above  ASSESSMENT/PLAN:   Hospital discharge follow-up  -     AK DISCHARGE MEDS RECONCILED W/ CURRENT OUTPATIENT MED LIST  Cellulitis of left lower extremity  CKD (chronic kidney disease) stage 4, GFR 15-29 ml/min ([address]  Type 2 diabetes mellitus with hyperglycemia, with long-term current use of insulin [facility])      Medical Decision Making: straightforward  Return in about 3 days (around 4/10/2023). Subjective:   HPI:  Follow up of Hospital problems/diagnosis(es): cellulitis  DM CKD    Inpatient course: Discharge summary reviewed- see chart.   Yes reviewed CT and ultrasound labs and notes from [facility]  Interval history/Current status: improving    Patient Active Problem List   Diagnosis    Type 2 diabetes with nephropathy ([address]    Essential hypertension, benign    Hypercalcemia    Arthritis of knee, left    LIZETT on CPAP    Total knee replacement status, left    Generalized osteoarthrosis, involving multiple sites    Arthritis of knee, right    Breast abscess    Abnormal urinalysis    Morbid obesity (HCC)    Hand eczema    Elevated serum creatinine    Mixed

## 2023-06-01 ENCOUNTER — TELEPHONE (OUTPATIENT)
Dept: ENDOCRINOLOGY | Age: 73
End: 2023-06-01

## 2023-06-01 NOTE — TELEPHONE ENCOUNTER
Faxed from pharm received requesting alt rx:  Novolin R/N instead of Humulin N/R                                **  Spoke to pharm to ensure alt rx was filled, pharm stated she will run the rx again for the preferred drug.

## 2023-07-11 ENCOUNTER — OFFICE VISIT (OUTPATIENT)
Dept: ENDOCRINOLOGY | Age: 73
End: 2023-07-11
Payer: MEDICARE

## 2023-07-11 ENCOUNTER — TELEPHONE (OUTPATIENT)
Dept: ENDOCRINOLOGY | Age: 73
End: 2023-07-11

## 2023-07-11 VITALS
BODY MASS INDEX: 43.41 KG/M2 | OXYGEN SATURATION: 98 % | WEIGHT: 249.6 LBS | DIASTOLIC BLOOD PRESSURE: 66 MMHG | SYSTOLIC BLOOD PRESSURE: 138 MMHG | HEART RATE: 67 BPM

## 2023-07-11 DIAGNOSIS — E78.2 MIXED HYPERLIPIDEMIA: ICD-10-CM

## 2023-07-11 DIAGNOSIS — I10 ESSENTIAL HYPERTENSION, BENIGN: ICD-10-CM

## 2023-07-11 DIAGNOSIS — N18.4 CKD (CHRONIC KIDNEY DISEASE) STAGE 4, GFR 15-29 ML/MIN (HCC): ICD-10-CM

## 2023-07-11 DIAGNOSIS — E11.65 TYPE 2 DIABETES MELLITUS WITH HYPERGLYCEMIA, WITH LONG-TERM CURRENT USE OF INSULIN (HCC): Primary | ICD-10-CM

## 2023-07-11 DIAGNOSIS — E55.9 VITAMIN D DEFICIENCY, UNSPECIFIED: ICD-10-CM

## 2023-07-11 DIAGNOSIS — Z79.4 TYPE 2 DIABETES MELLITUS WITH HYPERGLYCEMIA, WITH LONG-TERM CURRENT USE OF INSULIN (HCC): Primary | ICD-10-CM

## 2023-07-11 DIAGNOSIS — R94.6 ABNORMAL THYROID EXAM: ICD-10-CM

## 2023-07-11 LAB — HBA1C MFR BLD: 7.9 %

## 2023-07-11 PROCEDURE — 3075F SYST BP GE 130 - 139MM HG: CPT | Performed by: PHYSICIAN ASSISTANT

## 2023-07-11 PROCEDURE — G8399 PT W/DXA RESULTS DOCUMENT: HCPCS | Performed by: PHYSICIAN ASSISTANT

## 2023-07-11 PROCEDURE — 2022F DILAT RTA XM EVC RTNOPTHY: CPT | Performed by: PHYSICIAN ASSISTANT

## 2023-07-11 PROCEDURE — 1123F ACP DISCUSS/DSCN MKR DOCD: CPT | Performed by: PHYSICIAN ASSISTANT

## 2023-07-11 PROCEDURE — 3017F COLORECTAL CA SCREEN DOC REV: CPT | Performed by: PHYSICIAN ASSISTANT

## 2023-07-11 PROCEDURE — 3046F HEMOGLOBIN A1C LEVEL >9.0%: CPT | Performed by: PHYSICIAN ASSISTANT

## 2023-07-11 PROCEDURE — 3078F DIAST BP <80 MM HG: CPT | Performed by: PHYSICIAN ASSISTANT

## 2023-07-11 PROCEDURE — 1090F PRES/ABSN URINE INCON ASSESS: CPT | Performed by: PHYSICIAN ASSISTANT

## 2023-07-11 PROCEDURE — 1036F TOBACCO NON-USER: CPT | Performed by: PHYSICIAN ASSISTANT

## 2023-07-11 PROCEDURE — 83036 HEMOGLOBIN GLYCOSYLATED A1C: CPT | Performed by: PHYSICIAN ASSISTANT

## 2023-07-11 PROCEDURE — 99214 OFFICE O/P EST MOD 30 MIN: CPT | Performed by: PHYSICIAN ASSISTANT

## 2023-07-11 PROCEDURE — G8417 CALC BMI ABV UP PARAM F/U: HCPCS | Performed by: PHYSICIAN ASSISTANT

## 2023-07-11 PROCEDURE — G8427 DOCREV CUR MEDS BY ELIG CLIN: HCPCS | Performed by: PHYSICIAN ASSISTANT

## 2023-07-11 RX ORDER — LANCETS 33 GAUGE
EACH MISCELLANEOUS
Qty: 400 EACH | Refills: 3 | Status: SHIPPED | OUTPATIENT
Start: 2023-07-11

## 2023-07-11 RX ORDER — BLOOD SUGAR DIAGNOSTIC
STRIP MISCELLANEOUS
Qty: 400 EACH | Refills: 3 | Status: SHIPPED | OUTPATIENT
Start: 2023-07-11

## 2023-07-11 ASSESSMENT — ENCOUNTER SYMPTOMS
TROUBLE SWALLOWING: 0
VOICE CHANGE: 0

## 2023-07-11 NOTE — PROGRESS NOTES
[facility]    Mary Salter PA-C  [address] [facility]  [address] [address], [address], [address]  Phone [phone]  Facsimile [phone]          Lana Prieto is a 72 y.o. female seen 7/11/2023 for follow up evaluation of type 2 diabetes        Assessment and Plan:      1. Type 2 diabetes mellitus with hyperglycemia, with long-term current use of insulin ([address])  Patient remains on basal bolus insulin regimen now off of SGLT2i per nephro    She is experiencing postprandial hyperglycemia particularly at breakfast and lunch, rarely checking at bedtime. We will increase her prandial insulin at breakfast and lunch by 5 units, she is not currently taking 30 units 3 times daily as prescribed so she will be taking 25/35/25 plus correction of 1 per 25 greater than 150 correction at the 4 before's    Pt declines referral to diabetes education     Patient with benefit from CGM therapy, used 1 night and returned the next day secondary to alarms. Order Dexcom G7 with plans to get a CGM start class    - AMB POC HEMOGLOBIN A1C  - insulin regular (HUMULIN R;NOVOLIN R) 100 UNIT/ML injection; 25 units AC breakfast and supper, 35 units AC lunch plus correction 1/25>150, max daily dose 120 units  Dispense: 20 mL; Refill: 11  - ONETOUCH VERIO strip; CHECK GLUCOSE 4 TIMES DAILY, E11.65  Dispense: 400 each; Refill: 3  - Lancets (ONETOUCH DELICA PLUS [id number]) MISC; CHECK GLUCOSE 4 TIMES DAILY, E11.65  Dispense: 400 each; Refill: 3  - Comprehensive Metabolic Panel; Future  - CBC with Auto Differential; Future  - Hemoglobin A1C; Future        2. Essential hypertension, benign  BP Readings from Last 3 Encounters:   07/11/23 138/66   04/07/23 132/65   03/31/23 (!) 168/65       - Microalbumin / Creatinine Urine Ratio; Future    3. Mixed hyperlipidemia  Last LDL at goal may 2022 on atorvastatin 40mg    - Lipid Panel; Future    4.  CKD (chronic

## 2023-07-11 NOTE — TELEPHONE ENCOUNTER
G7 PARACHUTE ORDER SENT VIA [employer]:    Signature Pending  Requested from Dr. Gilma Esquivel via Email on 7/11    Pt made aware via phone call, she expressed understanding.

## 2023-07-11 NOTE — TELEPHONE ENCOUNTER
----- Message from Jaswinder Gold PA-C sent at 7/11/2023  9:54 AM EDT -----  Please send request for dexcom G7 to [employer]

## 2023-07-20 ENCOUNTER — HOSPITAL ENCOUNTER (OUTPATIENT)
Dept: ULTRASOUND IMAGING | Age: 73
Discharge: HOME | End: 2023-07-20
Payer: MEDICARE

## 2023-07-20 DIAGNOSIS — R94.6 ABNORMAL THYROID EXAM: ICD-10-CM

## 2023-07-20 PROCEDURE — 76536 US EXAM OF HEAD AND NECK: CPT

## 2023-08-01 DIAGNOSIS — I10 ESSENTIAL HYPERTENSION, BENIGN: ICD-10-CM

## 2023-08-01 RX ORDER — DILTIAZEM HYDROCHLORIDE 300 MG/1
300 CAPSULE, EXTENDED RELEASE ORAL DAILY
Qty: 30 CAPSULE | Refills: 1 | Status: SHIPPED | OUTPATIENT
Start: 2023-08-01

## 2023-08-01 NOTE — TELEPHONE ENCOUNTER
Pt called and said she needed a refill on the diltiazem. Somehow we missed filling this one when she came in back in March. Can you send this in for her?

## 2023-09-11 ENCOUNTER — OFFICE VISIT (OUTPATIENT)
Dept: FAMILY MEDICINE CLINIC | Facility: CLINIC | Age: 73
End: 2023-09-11
Payer: MEDICARE

## 2023-09-11 VITALS
WEIGHT: 249 LBS | BODY MASS INDEX: 42.51 KG/M2 | HEIGHT: 64 IN | DIASTOLIC BLOOD PRESSURE: 60 MMHG | HEART RATE: 65 BPM | SYSTOLIC BLOOD PRESSURE: 154 MMHG | TEMPERATURE: 98.2 F

## 2023-09-11 DIAGNOSIS — I10 ESSENTIAL HYPERTENSION, BENIGN: ICD-10-CM

## 2023-09-11 DIAGNOSIS — N18.4 CKD (CHRONIC KIDNEY DISEASE) STAGE 4, GFR 15-29 ML/MIN (HCC): ICD-10-CM

## 2023-09-11 DIAGNOSIS — Z00.00 MEDICARE ANNUAL WELLNESS VISIT, SUBSEQUENT: Primary | ICD-10-CM

## 2023-09-11 PROCEDURE — G8399 PT W/DXA RESULTS DOCUMENT: HCPCS | Performed by: NURSE PRACTITIONER

## 2023-09-11 PROCEDURE — 3017F COLORECTAL CA SCREEN DOC REV: CPT | Performed by: NURSE PRACTITIONER

## 2023-09-11 PROCEDURE — G8427 DOCREV CUR MEDS BY ELIG CLIN: HCPCS | Performed by: NURSE PRACTITIONER

## 2023-09-11 PROCEDURE — G8417 CALC BMI ABV UP PARAM F/U: HCPCS | Performed by: NURSE PRACTITIONER

## 2023-09-11 PROCEDURE — 3078F DIAST BP <80 MM HG: CPT | Performed by: NURSE PRACTITIONER

## 2023-09-11 PROCEDURE — 99214 OFFICE O/P EST MOD 30 MIN: CPT | Performed by: NURSE PRACTITIONER

## 2023-09-11 PROCEDURE — 1036F TOBACCO NON-USER: CPT | Performed by: NURSE PRACTITIONER

## 2023-09-11 PROCEDURE — 1123F ACP DISCUSS/DSCN MKR DOCD: CPT | Performed by: NURSE PRACTITIONER

## 2023-09-11 PROCEDURE — 3077F SYST BP >= 140 MM HG: CPT | Performed by: NURSE PRACTITIONER

## 2023-09-11 PROCEDURE — 1090F PRES/ABSN URINE INCON ASSESS: CPT | Performed by: NURSE PRACTITIONER

## 2023-09-11 RX ORDER — FUROSEMIDE 40 MG/1
20 TABLET ORAL DAILY
Qty: 90 TABLET | Refills: 1 | Status: CANCELLED | OUTPATIENT
Start: 2023-09-11

## 2023-09-11 RX ORDER — METOPROLOL SUCCINATE 50 MG/1
50 TABLET, EXTENDED RELEASE ORAL DAILY
Qty: 90 TABLET | Refills: 1 | Status: SHIPPED | OUTPATIENT
Start: 2023-09-11

## 2023-09-11 RX ORDER — ATORVASTATIN CALCIUM 40 MG/1
40 TABLET, FILM COATED ORAL NIGHTLY
Qty: 90 TABLET | Refills: 1 | Status: SHIPPED | OUTPATIENT
Start: 2023-09-11

## 2023-09-11 RX ORDER — SODIUM BICARBONATE 650 MG/1
TABLET ORAL
Refills: 11 | COMMUNITY
Start: 2023-08-21

## 2023-09-11 RX ORDER — HYDRALAZINE HYDROCHLORIDE 50 MG/1
TABLET, FILM COATED ORAL
Qty: 270 TABLET | Refills: 1 | Status: SHIPPED | OUTPATIENT
Start: 2023-09-11

## 2023-09-11 RX ORDER — DILTIAZEM HYDROCHLORIDE 300 MG/1
300 CAPSULE, EXTENDED RELEASE ORAL DAILY
Qty: 90 CAPSULE | Refills: 1 | Status: SHIPPED | OUTPATIENT
Start: 2023-09-11

## 2023-09-11 RX ORDER — CHLORTHALIDONE 25 MG/1
12.5 TABLET ORAL DAILY
Qty: 45 TABLET | Refills: 1 | Status: SHIPPED | OUTPATIENT
Start: 2023-09-11

## 2023-09-11 RX ORDER — SODIUM BICARBONATE 650 MG/1
650 TABLET ORAL 2 TIMES DAILY
COMMUNITY
Start: 2023-08-21 | End: 2023-09-11 | Stop reason: SDUPTHER

## 2023-09-11 NOTE — ADDENDUM NOTE
Addended by: Bill Clark on: 9/11/2023 11:07 AM     Modules accepted: Level of Service
Right ear hearing screen completed date: 2022  Right ear screen method: EOAE (evoked otoacoustic emission)  Right ear screen result: Passed  Right ear screen comment: N/A    Left ear hearing screen completed date: 2022  Left ear screen method: EOAE (evoked otoacoustic emission)  Left ear screen result: Passed  Left ear screen comments: N/A

## 2023-11-16 ENCOUNTER — OFFICE VISIT (OUTPATIENT)
Dept: ENDOCRINOLOGY | Age: 73
End: 2023-11-16

## 2023-11-16 VITALS
HEART RATE: 75 BPM | SYSTOLIC BLOOD PRESSURE: 130 MMHG | OXYGEN SATURATION: 97 % | BODY MASS INDEX: 42.96 KG/M2 | DIASTOLIC BLOOD PRESSURE: 62 MMHG | WEIGHT: 247 LBS

## 2023-11-16 DIAGNOSIS — E55.9 VITAMIN D DEFICIENCY: ICD-10-CM

## 2023-11-16 DIAGNOSIS — E04.2 MULTIPLE THYROID NODULES: ICD-10-CM

## 2023-11-16 DIAGNOSIS — E11.69 HYPERLIPIDEMIA ASSOCIATED WITH TYPE 2 DIABETES MELLITUS (HCC): ICD-10-CM

## 2023-11-16 DIAGNOSIS — E78.5 HYPERLIPIDEMIA ASSOCIATED WITH TYPE 2 DIABETES MELLITUS (HCC): ICD-10-CM

## 2023-11-16 DIAGNOSIS — I10 ESSENTIAL HYPERTENSION, BENIGN: ICD-10-CM

## 2023-11-16 DIAGNOSIS — E11.21 TYPE 2 DIABETES WITH NEPHROPATHY (HCC): Primary | ICD-10-CM

## 2023-11-16 DIAGNOSIS — N18.4 CKD (CHRONIC KIDNEY DISEASE) STAGE 4, GFR 15-29 ML/MIN (HCC): ICD-10-CM

## 2023-11-16 LAB — HBA1C MFR BLD: 6.6 %

## 2023-11-16 RX ORDER — DEXAMETHASONE 1 MG
TABLET ORAL
Qty: 1 TABLET | Refills: 0 | Status: SHIPPED | OUTPATIENT
Start: 2023-11-16

## 2023-11-16 ASSESSMENT — ENCOUNTER SYMPTOMS
TROUBLE SWALLOWING: 0
VOICE CHANGE: 0
SHORTNESS OF BREATH: 0

## 2023-11-16 NOTE — PATIENT INSTRUCTIONS
INSULIN ADMINISTRATION INSTRUCTIONS:    Patient Name:  Linwood Chavez   YOB: 1950    Provider Name:  Juaquin Palafox PA-C  Today's Date:  11/16/23      LONG-ACTING INSULIN     NPH    40 units BEFORE breakfast    40 units BEFORE supper      SHORT-ACTING INSULIN     Novolin R    30 units BEFORE breakfast  35 units BEFORE lunch  25 units BEFORE supper    Be sure to check blood glucose before meals and at bedtime. Based on the blood glucose reading, take the following amount of insulin:    Blood glucose 150 or less  No extra insulin  Blood glucose 151-175  1 units  Blood glucose 176-200  2 units  Blood glucose 201-225  3 units  Blood glucose 226-250  4 units  Blood glucose 251-275  5 units  Blood glucose  276-300  6 units  Blood glucose 301-325  7 units  Blood glucose  326-350  8 units  Blood glucose greater than 350 10 units        If there are questions, send an electronic [facility] message to for nonurgent questions or call the office at [phone].     [facility] ENDOCRINOLOGY  [address] [place] [phone]

## 2024-03-12 ENCOUNTER — OFFICE VISIT (OUTPATIENT)
Dept: FAMILY MEDICINE CLINIC | Facility: CLINIC | Age: 74
End: 2024-03-12
Payer: MEDICARE

## 2024-03-12 VITALS
HEART RATE: 68 BPM | DIASTOLIC BLOOD PRESSURE: 67 MMHG | TEMPERATURE: 97.6 F | WEIGHT: 254 LBS | SYSTOLIC BLOOD PRESSURE: 148 MMHG | HEIGHT: 64 IN | BODY MASS INDEX: 43.36 KG/M2

## 2024-03-12 DIAGNOSIS — N18.4 CKD (CHRONIC KIDNEY DISEASE) STAGE 4, GFR 15-29 ML/MIN (HCC): ICD-10-CM

## 2024-03-12 DIAGNOSIS — I10 ESSENTIAL HYPERTENSION, BENIGN: ICD-10-CM

## 2024-03-12 DIAGNOSIS — E11.21 TYPE 2 DIABETES WITH NEPHROPATHY (HCC): ICD-10-CM

## 2024-03-12 DIAGNOSIS — R60.0 EDEMA OF BOTH LOWER EXTREMITIES: ICD-10-CM

## 2024-03-12 PROCEDURE — G8484 FLU IMMUNIZE NO ADMIN: HCPCS | Performed by: NURSE PRACTITIONER

## 2024-03-12 PROCEDURE — 2022F DILAT RTA XM EVC RTNOPTHY: CPT | Performed by: NURSE PRACTITIONER

## 2024-03-12 PROCEDURE — 1090F PRES/ABSN URINE INCON ASSESS: CPT | Performed by: NURSE PRACTITIONER

## 2024-03-12 PROCEDURE — 3046F HEMOGLOBIN A1C LEVEL >9.0%: CPT | Performed by: NURSE PRACTITIONER

## 2024-03-12 PROCEDURE — G8399 PT W/DXA RESULTS DOCUMENT: HCPCS | Performed by: NURSE PRACTITIONER

## 2024-03-12 PROCEDURE — 3077F SYST BP >= 140 MM HG: CPT | Performed by: NURSE PRACTITIONER

## 2024-03-12 PROCEDURE — 1123F ACP DISCUSS/DSCN MKR DOCD: CPT | Performed by: NURSE PRACTITIONER

## 2024-03-12 PROCEDURE — 3017F COLORECTAL CA SCREEN DOC REV: CPT | Performed by: NURSE PRACTITIONER

## 2024-03-12 PROCEDURE — G8427 DOCREV CUR MEDS BY ELIG CLIN: HCPCS | Performed by: NURSE PRACTITIONER

## 2024-03-12 PROCEDURE — 3078F DIAST BP <80 MM HG: CPT | Performed by: NURSE PRACTITIONER

## 2024-03-12 PROCEDURE — G8417 CALC BMI ABV UP PARAM F/U: HCPCS | Performed by: NURSE PRACTITIONER

## 2024-03-12 PROCEDURE — 1036F TOBACCO NON-USER: CPT | Performed by: NURSE PRACTITIONER

## 2024-03-12 PROCEDURE — 99214 OFFICE O/P EST MOD 30 MIN: CPT | Performed by: NURSE PRACTITIONER

## 2024-03-12 RX ORDER — FUROSEMIDE 40 MG/1
40 TABLET ORAL DAILY
Qty: 90 TABLET | Refills: 1 | Status: SHIPPED | OUTPATIENT
Start: 2024-03-12

## 2024-03-12 RX ORDER — CHLORTHALIDONE 25 MG/1
12.5 TABLET ORAL DAILY
Qty: 45 TABLET | Refills: 1 | Status: CANCELLED | OUTPATIENT
Start: 2024-03-12

## 2024-03-12 RX ORDER — HYDRALAZINE HYDROCHLORIDE 50 MG/1
TABLET, FILM COATED ORAL
Qty: 270 TABLET | Refills: 1 | Status: SHIPPED | OUTPATIENT
Start: 2024-03-12

## 2024-03-12 RX ORDER — METOPROLOL SUCCINATE 50 MG/1
50 TABLET, EXTENDED RELEASE ORAL DAILY
Qty: 90 TABLET | Refills: 1 | Status: SHIPPED | OUTPATIENT
Start: 2024-03-12

## 2024-03-12 RX ORDER — DILTIAZEM HYDROCHLORIDE 300 MG/1
300 CAPSULE, EXTENDED RELEASE ORAL DAILY
Qty: 90 CAPSULE | Refills: 1 | Status: SHIPPED | OUTPATIENT
Start: 2024-03-12

## 2024-03-12 RX ORDER — ATORVASTATIN CALCIUM 40 MG/1
40 TABLET, FILM COATED ORAL NIGHTLY
Qty: 90 TABLET | Refills: 1 | Status: SHIPPED | OUTPATIENT
Start: 2024-03-12

## 2024-03-12 ASSESSMENT — PATIENT HEALTH QUESTIONNAIRE - PHQ9
SUM OF ALL RESPONSES TO PHQ QUESTIONS 1-9: 0
SUM OF ALL RESPONSES TO PHQ QUESTIONS 1-9: 0
2. FEELING DOWN, DEPRESSED OR HOPELESS: 0
SUM OF ALL RESPONSES TO PHQ QUESTIONS 1-9: 0
SUM OF ALL RESPONSES TO PHQ9 QUESTIONS 1 & 2: 0
SUM OF ALL RESPONSES TO PHQ QUESTIONS 1-9: 0
1. LITTLE INTEREST OR PLEASURE IN DOING THINGS: 0

## 2024-03-12 NOTE — PROGRESS NOTES
Subjective:      Patient ID: Yenifer Douglas is a 73 y.o. female.   Here for refills Feels well  Sees endo for DM   Sees Nephrologist for CKD and BP controlled has had some med changes done there  No chest pain no shortness of breath  No swelling in feet or legs maybe a bit by end of the day.   Activity tolerance is good  Is looking forward to a visit to [place] in [place].  Visiting at [facility] .     HPI    Review of Systems  No chest pain no shortness of breath  Feels well  Objective:   Physical Exam  Vitals and nursing note reviewed.   Constitutional:       Appearance: Normal appearance. She is obese.   Cardiovascular:      Rate and Rhythm: Normal rate and regular rhythm.      Pulses: Normal pulses.      Heart sounds: Normal heart sounds.   Pulmonary:      Effort: Pulmonary effort is normal.      Breath sounds: Normal breath sounds.   Musculoskeletal:         General: Swelling (lower legs with some swelling) present.   Skin:     General: Skin is warm and dry.   Neurological:      Mental Status: She is alert.   Psychiatric:         Mood and Affect: Mood normal.         Behavior: Behavior normal.         Thought Content: Thought content normal.         Judgment: Judgment normal.         Assessment:      BP (!) 148/67 (Site: Right Upper Arm, Position: Sitting, Cuff Size: Large Adult)   Pulse 68   Temp 97.6 °F (36.4 °C) (Temporal)   Ht 1.615 m (5' 3.58\")   Wt 115.2 kg (254 lb)   BMI 44.18 kg/m²        Plan:      1. Essential hypertension, benign  The following orders have not been finalized:  -     metoprolol succinate (TOPROL XL) 50 MG extended release tablet  -     hydrALAZINE (APRESOLINE) 50 MG tablet  -     dilTIAZem (TIAZAC) 300 MG extended release capsule  -     chlorthalidone (HYGROTON) 25 MG tablet  -     atorvastatin (LIPITOR) 40 MG tablet  -     furosemide (LASIX) 40 MG tablet  2. Edema of both lower extremities  The following orders have not been finalized:  -     furosemide (LASIX) 40 MG tablet  3. CKD

## 2024-03-25 ENCOUNTER — TELEMEDICINE (OUTPATIENT)
Dept: FAMILY MEDICINE CLINIC | Facility: CLINIC | Age: 74
End: 2024-03-25
Payer: MEDICARE

## 2024-03-25 VITALS
WEIGHT: 247 LBS | SYSTOLIC BLOOD PRESSURE: 130 MMHG | HEART RATE: 73 BPM | BODY MASS INDEX: 42.96 KG/M2 | DIASTOLIC BLOOD PRESSURE: 69 MMHG

## 2024-03-25 DIAGNOSIS — Z00.00 MEDICARE ANNUAL WELLNESS VISIT, SUBSEQUENT: Primary | ICD-10-CM

## 2024-03-25 DIAGNOSIS — I10 ESSENTIAL HYPERTENSION, BENIGN: ICD-10-CM

## 2024-03-25 PROBLEM — N18.4 DIABETES MELLITUS WITH STAGE 4 CHRONIC KIDNEY DISEASE (HCC): Status: ACTIVE | Noted: 2018-09-18

## 2024-03-25 PROBLEM — E11.22 DIABETES MELLITUS WITH STAGE 4 CHRONIC KIDNEY DISEASE (HCC): Status: ACTIVE | Noted: 2018-09-18

## 2024-03-25 PROCEDURE — G0439 PPPS, SUBSEQ VISIT: HCPCS | Performed by: NURSE PRACTITIONER

## 2024-03-25 PROCEDURE — 3075F SYST BP GE 130 - 139MM HG: CPT | Performed by: NURSE PRACTITIONER

## 2024-03-25 PROCEDURE — G8484 FLU IMMUNIZE NO ADMIN: HCPCS | Performed by: NURSE PRACTITIONER

## 2024-03-25 PROCEDURE — 3017F COLORECTAL CA SCREEN DOC REV: CPT | Performed by: NURSE PRACTITIONER

## 2024-03-25 PROCEDURE — 3078F DIAST BP <80 MM HG: CPT | Performed by: NURSE PRACTITIONER

## 2024-03-25 PROCEDURE — 1123F ACP DISCUSS/DSCN MKR DOCD: CPT | Performed by: NURSE PRACTITIONER

## 2024-03-25 RX ORDER — HYDRALAZINE HYDROCHLORIDE 50 MG/1
TABLET, FILM COATED ORAL
Qty: 270 TABLET | Refills: 1 | Status: SHIPPED | OUTPATIENT
Start: 2024-03-25

## 2024-03-25 RX ORDER — HYDRALAZINE HYDROCHLORIDE 50 MG/1
TABLET, FILM COATED ORAL
Qty: 270 TABLET | Refills: 1 | Status: SHIPPED | OUTPATIENT
Start: 2024-03-25 | End: 2024-03-25

## 2024-03-25 SDOH — ECONOMIC STABILITY: FOOD INSECURITY: WITHIN THE PAST 12 MONTHS, THE FOOD YOU BOUGHT JUST DIDN'T LAST AND YOU DIDN'T HAVE MONEY TO GET MORE.: NEVER TRUE

## 2024-03-25 SDOH — ECONOMIC STABILITY: FOOD INSECURITY: WITHIN THE PAST 12 MONTHS, YOU WORRIED THAT YOUR FOOD WOULD RUN OUT BEFORE YOU GOT MONEY TO BUY MORE.: NEVER TRUE

## 2024-03-25 SDOH — ECONOMIC STABILITY: INCOME INSECURITY: HOW HARD IS IT FOR YOU TO PAY FOR THE VERY BASICS LIKE FOOD, HOUSING, MEDICAL CARE, AND HEATING?: NOT HARD AT ALL

## 2024-03-25 ASSESSMENT — PATIENT HEALTH QUESTIONNAIRE - PHQ9
1. LITTLE INTEREST OR PLEASURE IN DOING THINGS: NOT AT ALL
SUM OF ALL RESPONSES TO PHQ QUESTIONS 1-9: 0
SUM OF ALL RESPONSES TO PHQ9 QUESTIONS 1 & 2: 0
2. FEELING DOWN, DEPRESSED OR HOPELESS: NOT AT ALL
SUM OF ALL RESPONSES TO PHQ QUESTIONS 1-9: 0

## 2024-03-25 ASSESSMENT — LIFESTYLE VARIABLES
HOW MANY STANDARD DRINKS CONTAINING ALCOHOL DO YOU HAVE ON A TYPICAL DAY: PATIENT DOES NOT DRINK
HOW OFTEN DO YOU HAVE A DRINK CONTAINING ALCOHOL: NEVER

## 2024-03-25 NOTE — PATIENT INSTRUCTIONS
[employer] [place] [employer] A[employer] online.  You need 3780-6147 mg of calcium and 9428-5952 IU of vitamin D per day. It is possible to meet your calcium requirement with diet alone, but a vitamin D supplement is usually necessary to meet this goal.  When exposed to the sun, use a sunscreen that protects against both UVA and UVB radiation with an SPF of 30 or greater. Reapply every 2 to 3 hours or after sweating, drying off with a towel, or swimming.  Always wear a seat belt when traveling in a car. Always wear a helmet when riding a bicycle or motorcycle.

## 2024-03-25 NOTE — PROGRESS NOTES
Medicare Annual Wellness Visit    Yenifer Douglas is here for Medicare AWV (AWV)    Assessment & Plan   Medicare annual wellness visit, subsequent    Recommendations for Preventive Services Due: see orders and patient instructions/AVS.  Recommended screening schedule for the next 5-10 years is provided to the patient in written form: see Patient Instructions/AVS.     Return in 1 year (on 3/25/2025).     Subjective       Patient's complete Health Risk Assessment and screening values have been reviewed and are found in Flowsheets. The following problems were reviewed today and where indicated follow up appointments were made and/or referrals ordered.    Positive Risk Factor Screenings with Interventions:                Activity, Diet, and Weight:  On average, how many days per week do you engage in moderate to strenuous exercise (like a brisk walk)?: 0 days  On average, how many minutes do you engage in exercise at this level?: 0 min    Do you eat balanced/healthy meals regularly?: Yes    Body mass index is 42.96 kg/m². (!) Abnormal      Inactivity Interventions:  Limited by her  hip pain  Obesity Interventions:  Is doing her best      Dentist Screen:  Have you seen the dentist within the past year?: (!) No    Intervention:  Advised to schedule with their dentist        Advanced Directives:  Do you have a Living Will?: (!) No    Intervention:  Have given the paperwork to complete she just needs to do it                  Objective      Patient-Reported Vitals  Patient-Reported Systolic (Top): 130 mmHg  Patient-Reported Diastolic (Bottom): 69 mmHg  BP Observations: Yes, BP was taken on electronic monitoring device with digital readout  Patient-Reported Pulse: 73  Patient-Reported Weight: 247lb            Allergies   Allergen Reactions    Amlodipine Other (See Comments)     Leg swelling    Erythromycin Other (See Comments)     Increased heart rate     Zolpidem Palpitations     Ambien CR heart race. ambien-hypnotics

## 2024-04-02 DIAGNOSIS — Z79.4 TYPE 2 DIABETES MELLITUS WITH HYPERGLYCEMIA, WITH LONG-TERM CURRENT USE OF INSULIN (HCC): ICD-10-CM

## 2024-04-02 DIAGNOSIS — E11.65 TYPE 2 DIABETES MELLITUS WITH HYPERGLYCEMIA, WITH LONG-TERM CURRENT USE OF INSULIN (HCC): ICD-10-CM

## 2024-04-02 RX ORDER — HUMAN INSULIN 100 [IU]/ML
INJECTION, SUSPENSION SUBCUTANEOUS
Qty: 20 ML | Refills: 10 | OUTPATIENT
Start: 2024-04-02

## 2024-04-10 ENCOUNTER — TELEPHONE (OUTPATIENT)
Dept: ENDOCRINOLOGY | Age: 74
End: 2024-04-10

## 2024-04-10 DIAGNOSIS — E04.2 MULTIPLE THYROID NODULES: Primary | ICD-10-CM

## 2024-04-10 NOTE — TELEPHONE ENCOUNTER
Luan response:    Hi,     Looks like you are due for repeat thyroid ultrasound. This has been ordered. It would be great if you can have it done BEFORE our May 16th appointment.     Take care,  ~Radha

## 2024-04-30 ENCOUNTER — HOSPITAL ENCOUNTER (OUTPATIENT)
Dept: ULTRASOUND IMAGING | Age: 74
Discharge: HOME | End: 2024-05-03
Payer: MEDICARE

## 2024-04-30 DIAGNOSIS — E04.2 MULTIPLE THYROID NODULES: ICD-10-CM

## 2024-04-30 PROCEDURE — 76536 US EXAM OF HEAD AND NECK: CPT

## 2024-05-15 ASSESSMENT — ENCOUNTER SYMPTOMS
VOICE CHANGE: 0
TROUBLE SWALLOWING: 0
SHORTNESS OF BREATH: 0

## 2024-05-15 NOTE — PROGRESS NOTES
[facility] ENDOCRINOLOGY   AND   THYROID NODULE CLINIC    Radha Olivares PA-C  [facility] Endocrinology and Thyroid Nodule Clinic  [address] [place] [address], [address]  [place] [address]  Phone [phone]  Facsimile [phone]          Yenifer Douglas is a 73 y.o. female seen 5/16/2024 for follow up evaluation of type 2 diabetes        Assessment and Plan:    Interpretation of 72 hour glucose monitor:  At least 72 hours of data were reviewed.  The patient utilizes a dexcom G7 continuous glucose monitoring system.  The average glucose during the reviewed timeframe was 198 with a standard deviation of 54.  There is a pattern of frequent rapid drop in glucose midday    1. Type 2 diabetes mellitus with hyperglycemia, with long-term current use of insulin (HCC)    Although A1c is at goal, glycemic control remains suboptimal in the context of anemia of chronic disease.  Remains on basal bolus insulin regimen regimen with NPH and R secondary to cost.      Encouraged to take NPH and R before breakfast and before supper for best results.    Using correction only at bedtime. Reduce correction from 1/25>150 to 1/50>150    Patient declines referral to diabetes education, would likely benefit    RX glucagon     At today's visit we discussed sequela associated with uncontrolled diabetes including increased risk of stroke, heart attack, kidney failure, amputation, retinopathy, neuropathy, and nephropathy.  Patient was strongly encouraged to comply with treatment regimen as well as dietary and exercise recommendations to aid in control of this chronic disease to help prevent complications associated with uncontrolled diabetes.      - AMB POC HEMOGLOBIN A1C  - insulin regular (HUMULIN R;NOVOLIN R) 100 UNIT/ML injection; 35 units TIDAC plus correction 1/50>150, max daily dose 120 units  Dispense: 20 mL; Refill: 11  - GVOKE HYPOPEN 2-PACK 1 MG/0.2ML SOAJ; Inject 1 mg into the skin as needed (severe hypoglycemia)  Dispense: 2

## 2024-05-16 ENCOUNTER — OFFICE VISIT (OUTPATIENT)
Dept: ENDOCRINOLOGY | Age: 74
End: 2024-05-16
Payer: MEDICARE

## 2024-05-16 VITALS
DIASTOLIC BLOOD PRESSURE: 74 MMHG | OXYGEN SATURATION: 95 % | BODY MASS INDEX: 42.88 KG/M2 | HEIGHT: 65 IN | WEIGHT: 257.4 LBS | SYSTOLIC BLOOD PRESSURE: 130 MMHG | HEART RATE: 71 BPM

## 2024-05-16 DIAGNOSIS — E11.69 HYPERLIPIDEMIA ASSOCIATED WITH TYPE 2 DIABETES MELLITUS (HCC): ICD-10-CM

## 2024-05-16 DIAGNOSIS — E04.2 MULTIPLE THYROID NODULES: ICD-10-CM

## 2024-05-16 DIAGNOSIS — I10 ESSENTIAL HYPERTENSION, BENIGN: ICD-10-CM

## 2024-05-16 DIAGNOSIS — E66.01 OBESITY, CLASS III, BMI 40-49.9 (MORBID OBESITY) (HCC): ICD-10-CM

## 2024-05-16 DIAGNOSIS — E11.65 TYPE 2 DIABETES MELLITUS WITH HYPERGLYCEMIA, WITH LONG-TERM CURRENT USE OF INSULIN (HCC): Primary | ICD-10-CM

## 2024-05-16 DIAGNOSIS — E78.5 HYPERLIPIDEMIA ASSOCIATED WITH TYPE 2 DIABETES MELLITUS (HCC): ICD-10-CM

## 2024-05-16 DIAGNOSIS — E11.21 TYPE 2 DIABETES WITH NEPHROPATHY (HCC): ICD-10-CM

## 2024-05-16 DIAGNOSIS — Z79.4 TYPE 2 DIABETES MELLITUS WITH HYPERGLYCEMIA, WITH LONG-TERM CURRENT USE OF INSULIN (HCC): Primary | ICD-10-CM

## 2024-05-16 DIAGNOSIS — E55.9 VITAMIN D DEFICIENCY: ICD-10-CM

## 2024-05-16 LAB — HBA1C MFR BLD: 6.4 %

## 2024-05-16 PROCEDURE — G8427 DOCREV CUR MEDS BY ELIG CLIN: HCPCS | Performed by: PHYSICIAN ASSISTANT

## 2024-05-16 PROCEDURE — 1123F ACP DISCUSS/DSCN MKR DOCD: CPT | Performed by: PHYSICIAN ASSISTANT

## 2024-05-16 PROCEDURE — 3046F HEMOGLOBIN A1C LEVEL >9.0%: CPT | Performed by: PHYSICIAN ASSISTANT

## 2024-05-16 PROCEDURE — G8417 CALC BMI ABV UP PARAM F/U: HCPCS | Performed by: PHYSICIAN ASSISTANT

## 2024-05-16 PROCEDURE — 83036 HEMOGLOBIN GLYCOSYLATED A1C: CPT | Performed by: PHYSICIAN ASSISTANT

## 2024-05-16 PROCEDURE — 2022F DILAT RTA XM EVC RTNOPTHY: CPT | Performed by: PHYSICIAN ASSISTANT

## 2024-05-16 PROCEDURE — 3075F SYST BP GE 130 - 139MM HG: CPT | Performed by: PHYSICIAN ASSISTANT

## 2024-05-16 PROCEDURE — 3078F DIAST BP <80 MM HG: CPT | Performed by: PHYSICIAN ASSISTANT

## 2024-05-16 PROCEDURE — 1090F PRES/ABSN URINE INCON ASSESS: CPT | Performed by: PHYSICIAN ASSISTANT

## 2024-05-16 PROCEDURE — 1036F TOBACCO NON-USER: CPT | Performed by: PHYSICIAN ASSISTANT

## 2024-05-16 PROCEDURE — 3017F COLORECTAL CA SCREEN DOC REV: CPT | Performed by: PHYSICIAN ASSISTANT

## 2024-05-16 PROCEDURE — 99214 OFFICE O/P EST MOD 30 MIN: CPT | Performed by: PHYSICIAN ASSISTANT

## 2024-05-16 PROCEDURE — 95251 CONT GLUC MNTR ANALYSIS I&R: CPT | Performed by: PHYSICIAN ASSISTANT

## 2024-05-16 PROCEDURE — G8399 PT W/DXA RESULTS DOCUMENT: HCPCS | Performed by: PHYSICIAN ASSISTANT

## 2024-05-16 RX ORDER — GLUCAGON INJECTION, SOLUTION 1 MG/.2ML
1 INJECTION, SOLUTION SUBCUTANEOUS PRN
Qty: 2 EACH | Refills: 1 | Status: SHIPPED | OUTPATIENT
Start: 2024-05-16

## 2024-05-23 DIAGNOSIS — E11.65 TYPE 2 DIABETES MELLITUS WITH HYPERGLYCEMIA, WITH LONG-TERM CURRENT USE OF INSULIN (HCC): ICD-10-CM

## 2024-05-23 DIAGNOSIS — Z79.4 TYPE 2 DIABETES MELLITUS WITH HYPERGLYCEMIA, WITH LONG-TERM CURRENT USE OF INSULIN (HCC): ICD-10-CM

## 2024-09-16 ENCOUNTER — OFFICE VISIT (OUTPATIENT)
Dept: FAMILY MEDICINE CLINIC | Facility: CLINIC | Age: 74
End: 2024-09-16
Payer: MEDICARE

## 2024-09-16 VITALS
SYSTOLIC BLOOD PRESSURE: 160 MMHG | HEIGHT: 65 IN | TEMPERATURE: 98 F | WEIGHT: 260 LBS | BODY MASS INDEX: 43.32 KG/M2 | HEART RATE: 67 BPM | DIASTOLIC BLOOD PRESSURE: 64 MMHG

## 2024-09-16 DIAGNOSIS — I10 ESSENTIAL HYPERTENSION, BENIGN: ICD-10-CM

## 2024-09-16 DIAGNOSIS — E11.69 HYPERLIPIDEMIA ASSOCIATED WITH TYPE 2 DIABETES MELLITUS (HCC): Primary | ICD-10-CM

## 2024-09-16 DIAGNOSIS — R60.0 EDEMA OF BOTH LOWER EXTREMITIES: ICD-10-CM

## 2024-09-16 DIAGNOSIS — E78.5 HYPERLIPIDEMIA ASSOCIATED WITH TYPE 2 DIABETES MELLITUS (HCC): Primary | ICD-10-CM

## 2024-09-16 LAB
CHOLEST SERPL-MCNC: 139 MG/DL (ref 0–200)
HDLC SERPL-MCNC: 36 MG/DL (ref 40–60)
HDLC SERPL: 3.9 (ref 0–5)
LDLC SERPL CALC-MCNC: 48 MG/DL (ref 0–100)
TRIGL SERPL-MCNC: 275 MG/DL (ref 0–150)
VLDLC SERPL CALC-MCNC: 55 MG/DL (ref 6–23)

## 2024-09-16 PROCEDURE — 1123F ACP DISCUSS/DSCN MKR DOCD: CPT | Performed by: NURSE PRACTITIONER

## 2024-09-16 PROCEDURE — 1036F TOBACCO NON-USER: CPT | Performed by: NURSE PRACTITIONER

## 2024-09-16 PROCEDURE — G8399 PT W/DXA RESULTS DOCUMENT: HCPCS | Performed by: NURSE PRACTITIONER

## 2024-09-16 PROCEDURE — 99213 OFFICE O/P EST LOW 20 MIN: CPT | Performed by: NURSE PRACTITIONER

## 2024-09-16 PROCEDURE — 3017F COLORECTAL CA SCREEN DOC REV: CPT | Performed by: NURSE PRACTITIONER

## 2024-09-16 PROCEDURE — 2022F DILAT RTA XM EVC RTNOPTHY: CPT | Performed by: NURSE PRACTITIONER

## 2024-09-16 PROCEDURE — 3077F SYST BP >= 140 MM HG: CPT | Performed by: NURSE PRACTITIONER

## 2024-09-16 PROCEDURE — 1090F PRES/ABSN URINE INCON ASSESS: CPT | Performed by: NURSE PRACTITIONER

## 2024-09-16 PROCEDURE — G8417 CALC BMI ABV UP PARAM F/U: HCPCS | Performed by: NURSE PRACTITIONER

## 2024-09-16 PROCEDURE — 3046F HEMOGLOBIN A1C LEVEL >9.0%: CPT | Performed by: NURSE PRACTITIONER

## 2024-09-16 PROCEDURE — G8427 DOCREV CUR MEDS BY ELIG CLIN: HCPCS | Performed by: NURSE PRACTITIONER

## 2024-09-16 PROCEDURE — 3078F DIAST BP <80 MM HG: CPT | Performed by: NURSE PRACTITIONER

## 2024-09-16 RX ORDER — HYDRALAZINE HYDROCHLORIDE 50 MG/1
TABLET, FILM COATED ORAL
Qty: 270 TABLET | Refills: 1 | Status: SHIPPED | OUTPATIENT
Start: 2024-09-16

## 2024-09-16 RX ORDER — DILTIAZEM HYDROCHLORIDE 300 MG/1
300 CAPSULE, EXTENDED RELEASE ORAL DAILY
Qty: 90 CAPSULE | Refills: 1 | Status: SHIPPED | OUTPATIENT
Start: 2024-09-16

## 2024-09-16 RX ORDER — FUROSEMIDE 40 MG
40 TABLET ORAL DAILY
Qty: 90 TABLET | Refills: 1 | Status: SHIPPED | OUTPATIENT
Start: 2024-09-16

## 2024-09-16 RX ORDER — HYDRALAZINE HYDROCHLORIDE 50 MG/1
TABLET, FILM COATED ORAL
Qty: 270 TABLET | Refills: 1 | Status: CANCELLED | OUTPATIENT
Start: 2024-09-16

## 2024-09-16 RX ORDER — ATORVASTATIN CALCIUM 40 MG/1
40 TABLET, FILM COATED ORAL NIGHTLY
Qty: 90 TABLET | Refills: 1 | Status: SHIPPED | OUTPATIENT
Start: 2024-09-16

## 2024-09-16 RX ORDER — DIPHENHYDRAMINE HCL 25 MG
50 TABLET ORAL NIGHTLY
COMMUNITY
End: 2024-09-16 | Stop reason: ALTCHOICE

## 2024-09-16 RX ORDER — METOPROLOL SUCCINATE 100 MG/1
100 TABLET, EXTENDED RELEASE ORAL DAILY
COMMUNITY
Start: 2024-08-16

## 2024-09-16 ASSESSMENT — ENCOUNTER SYMPTOMS: SHORTNESS OF BREATH: 0

## 2024-09-17 ENCOUNTER — TELEPHONE (OUTPATIENT)
Dept: FAMILY MEDICINE CLINIC | Facility: CLINIC | Age: 74
End: 2024-09-17

## 2024-11-18 ENCOUNTER — OFFICE VISIT (OUTPATIENT)
Dept: ENDOCRINOLOGY | Age: 74
End: 2024-11-18
Payer: MEDICARE

## 2024-11-18 VITALS
OXYGEN SATURATION: 98 % | BODY MASS INDEX: 41.37 KG/M2 | SYSTOLIC BLOOD PRESSURE: 122 MMHG | WEIGHT: 248.6 LBS | DIASTOLIC BLOOD PRESSURE: 82 MMHG | HEART RATE: 81 BPM

## 2024-11-18 DIAGNOSIS — E66.01 OBESITY, CLASS III, BMI 40-49.9 (MORBID OBESITY): ICD-10-CM

## 2024-11-18 DIAGNOSIS — E55.9 VITAMIN D DEFICIENCY: ICD-10-CM

## 2024-11-18 DIAGNOSIS — E11.21 TYPE 2 DIABETES WITH NEPHROPATHY (HCC): ICD-10-CM

## 2024-11-18 DIAGNOSIS — I10 ESSENTIAL HYPERTENSION, BENIGN: ICD-10-CM

## 2024-11-18 DIAGNOSIS — E11.65 TYPE 2 DIABETES MELLITUS WITH HYPERGLYCEMIA, WITH LONG-TERM CURRENT USE OF INSULIN (HCC): Primary | ICD-10-CM

## 2024-11-18 DIAGNOSIS — E78.5 HYPERLIPIDEMIA ASSOCIATED WITH TYPE 2 DIABETES MELLITUS (HCC): ICD-10-CM

## 2024-11-18 DIAGNOSIS — Z79.4 TYPE 2 DIABETES MELLITUS WITH HYPERGLYCEMIA, WITH LONG-TERM CURRENT USE OF INSULIN (HCC): Primary | ICD-10-CM

## 2024-11-18 DIAGNOSIS — E11.69 HYPERLIPIDEMIA ASSOCIATED WITH TYPE 2 DIABETES MELLITUS (HCC): ICD-10-CM

## 2024-11-18 DIAGNOSIS — E04.2 MULTIPLE THYROID NODULES: ICD-10-CM

## 2024-11-18 PROCEDURE — G8417 CALC BMI ABV UP PARAM F/U: HCPCS | Performed by: PHYSICIAN ASSISTANT

## 2024-11-18 PROCEDURE — 2022F DILAT RTA XM EVC RTNOPTHY: CPT | Performed by: PHYSICIAN ASSISTANT

## 2024-11-18 PROCEDURE — 1160F RVW MEDS BY RX/DR IN RCRD: CPT | Performed by: PHYSICIAN ASSISTANT

## 2024-11-18 PROCEDURE — 3017F COLORECTAL CA SCREEN DOC REV: CPT | Performed by: PHYSICIAN ASSISTANT

## 2024-11-18 PROCEDURE — 1159F MED LIST DOCD IN RCRD: CPT | Performed by: PHYSICIAN ASSISTANT

## 2024-11-18 PROCEDURE — 1036F TOBACCO NON-USER: CPT | Performed by: PHYSICIAN ASSISTANT

## 2024-11-18 PROCEDURE — 3046F HEMOGLOBIN A1C LEVEL >9.0%: CPT | Performed by: PHYSICIAN ASSISTANT

## 2024-11-18 PROCEDURE — 1090F PRES/ABSN URINE INCON ASSESS: CPT | Performed by: PHYSICIAN ASSISTANT

## 2024-11-18 PROCEDURE — 99214 OFFICE O/P EST MOD 30 MIN: CPT | Performed by: PHYSICIAN ASSISTANT

## 2024-11-18 PROCEDURE — G8484 FLU IMMUNIZE NO ADMIN: HCPCS | Performed by: PHYSICIAN ASSISTANT

## 2024-11-18 PROCEDURE — 3074F SYST BP LT 130 MM HG: CPT | Performed by: PHYSICIAN ASSISTANT

## 2024-11-18 PROCEDURE — G8427 DOCREV CUR MEDS BY ELIG CLIN: HCPCS | Performed by: PHYSICIAN ASSISTANT

## 2024-11-18 PROCEDURE — 3079F DIAST BP 80-89 MM HG: CPT | Performed by: PHYSICIAN ASSISTANT

## 2024-11-18 PROCEDURE — 95251 CONT GLUC MNTR ANALYSIS I&R: CPT | Performed by: PHYSICIAN ASSISTANT

## 2024-11-18 PROCEDURE — G8399 PT W/DXA RESULTS DOCUMENT: HCPCS | Performed by: PHYSICIAN ASSISTANT

## 2024-11-18 PROCEDURE — 1123F ACP DISCUSS/DSCN MKR DOCD: CPT | Performed by: PHYSICIAN ASSISTANT

## 2024-11-18 RX ORDER — IBUPROFEN 600 MG/1
1 TABLET ORAL PRN
Qty: 1 KIT | Refills: 2 | Status: SHIPPED | OUTPATIENT
Start: 2024-11-18

## 2024-11-18 ASSESSMENT — ENCOUNTER SYMPTOMS
SHORTNESS OF BREATH: 0
VOICE CHANGE: 0
TROUBLE SWALLOWING: 0

## 2024-11-18 NOTE — PROGRESS NOTES
Feet:      Right foot:      Protective Sensation: 3 sites tested.  3 sites sensed.      Skin integrity: Skin integrity normal.      Left foot:      Protective Sensation: 3 sites tested.  3 sites sensed.      Skin integrity: Skin integrity normal.   Lymphadenopathy:      Cervical: No cervical adenopathy.   Skin:     General: Skin is warm and dry.   Neurological:      General: No focal deficit present.      Mental Status: She is alert.      Sensory: Sensation is intact.   Psychiatric:         Mood and Affect: Mood normal.         Behavior: Behavior normal.         Thought Content: Thought content normal.         Judgment: Judgment normal.             Return 3-4 mo, for Diabetes DM2 Follow-Up.        Portions of this note were generated with the assistance of voice recogniton software.  As such, some errors in transcription may be present.

## 2024-11-18 NOTE — PATIENT INSTRUCTIONS
INSULIN ADMINISTRATION INSTRUCTIONS:    Patient Name:  Yenifer Douglas   YOB: 1950    Provider Name:  Radha Olivares PA-C  Today's Date:  11/18/24      LONG-ACTING INSULIN     NPH    45 units Before breakfast and supper    The purpose of long-acting insulin is to try to achieve fasting (first thing in the morning) blood glucose in the morning .  If fasting blood glucose is persistently above 125, increase the long-acting insulin dose by 2 units every 3 days until fasting blood glucose is persistently .  If fasting blood glucose is persistently less than 80, decrease the long-acting insulin dose by 2 units every 3 days until fasting blood glucose is persistently .  Leave the dose at whichever dose it takes to keep fasting blood glucose .      SHORT-ACTING INSULIN     Novolin R    35 units three a day before meals     Be sure to check blood glucose before meals and at bedtime.  Based on the blood glucose reading, take the following amount of insulin:    Blood glucose 150 or less  No extra insulin  Blood glucose 151-200  1 units  Blood glucose 201-250  2 units  Blood glucose 251-300  3 units  Blood glucose 301-350  4 units  Blood glucose greater than 350 5 units      If there are questions, send an electronic [employer] message to for nonurgent questions or call the office at [phone].    [facility]  [address] [place]  [facility] [id number]

## 2025-03-14 ENCOUNTER — OFFICE VISIT (OUTPATIENT)
Dept: FAMILY MEDICINE CLINIC | Facility: CLINIC | Age: 75
End: 2025-03-14
Payer: MEDICARE

## 2025-03-14 VITALS
SYSTOLIC BLOOD PRESSURE: 191 MMHG | HEART RATE: 64 BPM | HEIGHT: 65 IN | BODY MASS INDEX: 41.99 KG/M2 | TEMPERATURE: 98.1 F | WEIGHT: 252 LBS | DIASTOLIC BLOOD PRESSURE: 60 MMHG

## 2025-03-14 DIAGNOSIS — C50.919 ANGIOSARCOMA OF BREAST (HCC): Primary | ICD-10-CM

## 2025-03-14 DIAGNOSIS — I10 ESSENTIAL HYPERTENSION, BENIGN: ICD-10-CM

## 2025-03-14 DIAGNOSIS — E78.5 HYPERLIPIDEMIA ASSOCIATED WITH TYPE 2 DIABETES MELLITUS (HCC): ICD-10-CM

## 2025-03-14 DIAGNOSIS — N18.4 CKD (CHRONIC KIDNEY DISEASE) STAGE 4, GFR 15-29 ML/MIN (HCC): ICD-10-CM

## 2025-03-14 DIAGNOSIS — R60.0 EDEMA OF BOTH LOWER EXTREMITIES: ICD-10-CM

## 2025-03-14 DIAGNOSIS — E11.69 HYPERLIPIDEMIA ASSOCIATED WITH TYPE 2 DIABETES MELLITUS (HCC): ICD-10-CM

## 2025-03-14 PROCEDURE — G8399 PT W/DXA RESULTS DOCUMENT: HCPCS | Performed by: NURSE PRACTITIONER

## 2025-03-14 PROCEDURE — 3078F DIAST BP <80 MM HG: CPT | Performed by: NURSE PRACTITIONER

## 2025-03-14 PROCEDURE — 3017F COLORECTAL CA SCREEN DOC REV: CPT | Performed by: NURSE PRACTITIONER

## 2025-03-14 PROCEDURE — 1036F TOBACCO NON-USER: CPT | Performed by: NURSE PRACTITIONER

## 2025-03-14 PROCEDURE — 2022F DILAT RTA XM EVC RTNOPTHY: CPT | Performed by: NURSE PRACTITIONER

## 2025-03-14 PROCEDURE — G8427 DOCREV CUR MEDS BY ELIG CLIN: HCPCS | Performed by: NURSE PRACTITIONER

## 2025-03-14 PROCEDURE — 3046F HEMOGLOBIN A1C LEVEL >9.0%: CPT | Performed by: NURSE PRACTITIONER

## 2025-03-14 PROCEDURE — 1159F MED LIST DOCD IN RCRD: CPT | Performed by: NURSE PRACTITIONER

## 2025-03-14 PROCEDURE — 99214 OFFICE O/P EST MOD 30 MIN: CPT | Performed by: NURSE PRACTITIONER

## 2025-03-14 PROCEDURE — 1123F ACP DISCUSS/DSCN MKR DOCD: CPT | Performed by: NURSE PRACTITIONER

## 2025-03-14 PROCEDURE — 1090F PRES/ABSN URINE INCON ASSESS: CPT | Performed by: NURSE PRACTITIONER

## 2025-03-14 PROCEDURE — G8417 CALC BMI ABV UP PARAM F/U: HCPCS | Performed by: NURSE PRACTITIONER

## 2025-03-14 PROCEDURE — 3077F SYST BP >= 140 MM HG: CPT | Performed by: NURSE PRACTITIONER

## 2025-03-14 RX ORDER — DILTIAZEM HYDROCHLORIDE 300 MG/1
300 CAPSULE, EXTENDED RELEASE ORAL DAILY
Qty: 90 CAPSULE | Refills: 1 | Status: SHIPPED | OUTPATIENT
Start: 2025-03-14

## 2025-03-14 RX ORDER — FUROSEMIDE 40 MG/1
40 TABLET ORAL DAILY
Qty: 90 TABLET | Refills: 1 | Status: SHIPPED | OUTPATIENT
Start: 2025-03-14

## 2025-03-14 RX ORDER — HYDRALAZINE HYDROCHLORIDE 50 MG/1
TABLET, FILM COATED ORAL
Qty: 270 TABLET | Refills: 1 | Status: SHIPPED | OUTPATIENT
Start: 2025-03-14

## 2025-03-14 RX ORDER — ATORVASTATIN CALCIUM 40 MG/1
40 TABLET, FILM COATED ORAL NIGHTLY
Qty: 90 TABLET | Refills: 1 | Status: SHIPPED | OUTPATIENT
Start: 2025-03-14

## 2025-03-14 RX ORDER — LIDOCAINE AND PRILOCAINE 25; 25 MG/G; MG/G
CREAM TOPICAL ONCE
COMMUNITY
Start: 2025-02-28

## 2025-03-14 RX ORDER — SULFAMETHOXAZOLE AND TRIMETHOPRIM 800; 160 MG/1; MG/1
1 TABLET ORAL 2 TIMES DAILY
COMMUNITY
Start: 2025-03-13

## 2025-03-14 SDOH — ECONOMIC STABILITY: FOOD INSECURITY: WITHIN THE PAST 12 MONTHS, YOU WORRIED THAT YOUR FOOD WOULD RUN OUT BEFORE YOU GOT MONEY TO BUY MORE.: NEVER TRUE

## 2025-03-14 SDOH — ECONOMIC STABILITY: FOOD INSECURITY: WITHIN THE PAST 12 MONTHS, THE FOOD YOU BOUGHT JUST DIDN'T LAST AND YOU DIDN'T HAVE MONEY TO GET MORE.: NEVER TRUE

## 2025-03-14 ASSESSMENT — PATIENT HEALTH QUESTIONNAIRE - PHQ9
SUM OF ALL RESPONSES TO PHQ QUESTIONS 1-9: 0
1. LITTLE INTEREST OR PLEASURE IN DOING THINGS: NOT AT ALL
2. FEELING DOWN, DEPRESSED OR HOPELESS: NOT AT ALL
SUM OF ALL RESPONSES TO PHQ QUESTIONS 1-9: 0

## 2025-03-14 NOTE — ASSESSMENT & PLAN NOTE
Not well controlled but  as feels well will continue current meds. Lasix not good for her poor renal function but her edema in her right leg is likely to make resolution of the cellulitis difficult. Will continue but urged to raise leg over level or heart and wear a compress stocking when and if she an get one on. Has follow up apt Monday and will get labs done there also on oral bactrim at high dose which may also worsen renal function. We are between bad choices.     Orders:    furosemide (LASIX) 40 MG tablet; Take 1 tablet by mouth daily    hydrALAZINE (APRESOLINE) 50 MG tablet; Take 1 tablet by mouth three times a day    dilTIAZem (TIAZAC) 300 MG extended release capsule; Take 1 capsule by mouth daily

## 2025-03-14 NOTE — ASSESSMENT & PLAN NOTE
Will not check labs as she is a very difficult stick Told her she can stop the statin if causes any issues for her    Orders:    atorvastatin (LIPITOR) 40 MG tablet; Take 1 tablet by mouth at bedtime

## 2025-03-14 NOTE — PROGRESS NOTES
Yenifer Douglas (:  1950) is a 74 y.o. female,Established patient, here for evaluation of the following chief complaint(S):  Follow-up Chronic Condition (F3 6 mo fu/labs/med refills )         Assessment & Plan  Essential hypertension, benign   Not well controlled but  as feels well will continue current meds. Lasix not good for her poor renal function but her edema in her right leg is likely to make resolution of the cellulitis difficult. Will continue but urged to raise leg over level or heart and wear a compress stocking when and if she an get one on. Has follow up apt Monday and will get labs done there also on oral bactrim at high dose which may also worsen renal function. We are between bad choices.     Orders:    furosemide (LASIX) 40 MG tablet; Take 1 tablet by mouth daily    hydrALAZINE (APRESOLINE) 50 MG tablet; Take 1 tablet by mouth three times a day    dilTIAZem (TIAZAC) 300 MG extended release capsule; Take 1 capsule by mouth daily    Edema of both lower extremities   Chronic, at goal (stable), continue current treatment plan  See note above  Orders:    furosemide (LASIX) 40 MG tablet; Take 1 tablet by mouth daily    Hyperlipidemia associated with type 2 diabetes mellitus (HCC)    Will not check labs as she is a very difficult stick Told her she can stop the statin if causes any issues for her    Orders:    atorvastatin (LIPITOR) 40 MG tablet; Take 1 tablet by mouth at bedtime    CKD (chronic kidney disease) stage 4, GFR 15-29 ml/min ([facility])    She has a nephrologist fortunately and may need to see him  Suspect her bp and swelling may in part be due to renal failure. She feels well if worsening s.s back to hospital        Angiosarcoma of breast (HCC)    Sees oncology at [facility] and is getting treatment there           Return in about 6 months (around 2025) for fasting labs/med refills.       Subjective   HPI Here for follow up . Was in hospital with port infection and right lower leg edema .

## 2025-03-14 NOTE — ASSESSMENT & PLAN NOTE
Chronic, at goal (stable), continue current treatment plan  See note above  Orders:    furosemide (LASIX) 40 MG tablet; Take 1 tablet by mouth daily

## 2025-03-14 NOTE — ASSESSMENT & PLAN NOTE
She has a nephrologist fortunately and may need to see him  Suspect her bp and swelling may in part be due to renal failure. She feels well if worsening s.s back to hospital

## 2025-04-05 DIAGNOSIS — Z79.4 TYPE 2 DIABETES MELLITUS WITH HYPERGLYCEMIA, WITH LONG-TERM CURRENT USE OF INSULIN (HCC): ICD-10-CM

## 2025-04-05 DIAGNOSIS — E11.65 TYPE 2 DIABETES MELLITUS WITH HYPERGLYCEMIA, WITH LONG-TERM CURRENT USE OF INSULIN (HCC): ICD-10-CM

## 2025-04-07 RX ORDER — HUMAN INSULIN 100 [IU]/ML
INJECTION, SUSPENSION SUBCUTANEOUS
Qty: 20 ML | Refills: 11 | OUTPATIENT
Start: 2025-04-07

## 2025-04-24 ENCOUNTER — OFFICE VISIT (OUTPATIENT)
Dept: SLEEP MEDICINE | Age: 75
End: 2025-04-24
Payer: MEDICARE

## 2025-04-24 VITALS
HEART RATE: 69 BPM | DIASTOLIC BLOOD PRESSURE: 68 MMHG | OXYGEN SATURATION: 91 % | BODY MASS INDEX: 42.65 KG/M2 | RESPIRATION RATE: 18 BRPM | SYSTOLIC BLOOD PRESSURE: 167 MMHG | WEIGHT: 256 LBS | HEIGHT: 65 IN

## 2025-04-24 DIAGNOSIS — G47.33 OSA (OBSTRUCTIVE SLEEP APNEA): Primary | ICD-10-CM

## 2025-04-24 DIAGNOSIS — R06.83 SNORING: ICD-10-CM

## 2025-04-24 DIAGNOSIS — G47.00 PERSISTENT DISORDER OF INITIATING OR MAINTAINING SLEEP: ICD-10-CM

## 2025-04-24 DIAGNOSIS — G47.8 NON-RESTORATIVE SLEEP: ICD-10-CM

## 2025-04-24 DIAGNOSIS — G47.34 NOCTURNAL HYPOXEMIA: ICD-10-CM

## 2025-04-24 PROCEDURE — 3017F COLORECTAL CA SCREEN DOC REV: CPT | Performed by: NURSE PRACTITIONER

## 2025-04-24 PROCEDURE — 1160F RVW MEDS BY RX/DR IN RCRD: CPT | Performed by: NURSE PRACTITIONER

## 2025-04-24 PROCEDURE — 3077F SYST BP >= 140 MM HG: CPT | Performed by: NURSE PRACTITIONER

## 2025-04-24 PROCEDURE — G8399 PT W/DXA RESULTS DOCUMENT: HCPCS | Performed by: NURSE PRACTITIONER

## 2025-04-24 PROCEDURE — G8417 CALC BMI ABV UP PARAM F/U: HCPCS | Performed by: NURSE PRACTITIONER

## 2025-04-24 PROCEDURE — 3078F DIAST BP <80 MM HG: CPT | Performed by: NURSE PRACTITIONER

## 2025-04-24 PROCEDURE — 1090F PRES/ABSN URINE INCON ASSESS: CPT | Performed by: NURSE PRACTITIONER

## 2025-04-24 PROCEDURE — 99203 OFFICE O/P NEW LOW 30 MIN: CPT | Performed by: NURSE PRACTITIONER

## 2025-04-24 PROCEDURE — G2211 COMPLEX E/M VISIT ADD ON: HCPCS | Performed by: NURSE PRACTITIONER

## 2025-04-24 PROCEDURE — 1123F ACP DISCUSS/DSCN MKR DOCD: CPT | Performed by: NURSE PRACTITIONER

## 2025-04-24 PROCEDURE — 1036F TOBACCO NON-USER: CPT | Performed by: NURSE PRACTITIONER

## 2025-04-24 PROCEDURE — 1159F MED LIST DOCD IN RCRD: CPT | Performed by: NURSE PRACTITIONER

## 2025-04-24 PROCEDURE — G8427 DOCREV CUR MEDS BY ELIG CLIN: HCPCS | Performed by: NURSE PRACTITIONER

## 2025-04-24 ASSESSMENT — SLEEP AND FATIGUE QUESTIONNAIRES
HOW LIKELY ARE YOU TO NOD OFF OR FALL ASLEEP WHILE SITTING AND TALKING TO SOMEONE: WOULD NEVER DOZE
HOW LIKELY ARE YOU TO NOD OFF OR FALL ASLEEP IN A CAR, WHILE STOPPED FOR A FEW MINUTES IN TRAFFIC: WOULD NEVER DOZE
HOW LIKELY ARE YOU TO NOD OFF OR FALL ASLEEP WHILE LYING DOWN TO REST IN THE AFTERNOON WHEN CIRCUMSTANCES PERMIT: SLIGHT CHANCE OF DOZING
ESS TOTAL SCORE: 9
HOW LIKELY ARE YOU TO NOD OFF OR FALL ASLEEP WHILE SITTING QUIETLY AFTER LUNCH WITHOUT ALCOHOL: WOULD NEVER DOZE
HOW LIKELY ARE YOU TO NOD OFF OR FALL ASLEEP WHILE WATCHING TV: HIGH CHANCE OF DOZING
HOW LIKELY ARE YOU TO NOD OFF OR FALL ASLEEP WHEN YOU ARE A PASSENGER IN A CAR FOR AN HOUR WITHOUT A BREAK: HIGH CHANCE OF DOZING
HOW LIKELY ARE YOU TO NOD OFF OR FALL ASLEEP WHILE SITTING AND READING: SLIGHT CHANCE OF DOZING
HOW LIKELY ARE YOU TO NOD OFF OR FALL ASLEEP WHILE SITTING INACTIVE IN A PUBLIC PLACE: SLIGHT CHANCE OF DOZING

## 2025-04-24 NOTE — PATIENT INSTRUCTIONS
Continue CPAP 16 cm H2O with nightly compliance  Replacement CPAP machine and supplies ordered  Recommendations as above  Follow-up in 4 months or sooner if needed for compliance

## 2025-04-24 NOTE — PROGRESS NOTES
[facility]  [address]  [place] [address]  [phone]    Patient Name:  Yenifer Douglas  YOB: 1950      Office Visit 4/24/2025    CHIEF COMPLAINT:    Chief Complaint   Patient presents with    New Patient    Sleep Apnea       HISTORY OF PRESENT ILLNESS:      The patient presents in outpatient consultation at the request of self referral for management of obstructive sleep apnea.  Significant PMH of CKD, hypertension, heart murmur, diabetes, LIZETT on CPAP, and obesity.     The diagnostic polysomnography was notable for an apnea hypopnea index of 85.8 including 124 obstructive apneas, 3 central apneas, and 11 hypopneas.  Oxygen desaturations are low as 80% were noted with SpO2 less than 89% for a total of 10.4 minutes of the test.  Significant cardiac arrhythmias were not evident.  The patient was noted to have 95.8 limb movements with a limb movement arousal index being about 3.7.  A subsequent CPAP titration study was conducted.  CPAP levels as high as 12 cm were performed.  CPAP was significantly effective in eliminating disordered breathing. CPAP was tolerated well by the patient.  The patient reports feeling good. the day following.    She is prescribed cpap therapy with a humidifier set at 16 cm with a full face mask. Most recent download reveals AHI on PAP therapy is 4.9, leak is median 0.0 and 0.7 at 95-percentile and the hourly usage is 8 hours 28 minutes nightly. The overall use is 3040 hours with days greater than four hours at 359/365. The patient is compliant with the Pap therapy and is feeling better as a result.     She reports that prior to being diagnosed with sleep apnea years ago she was snoring and never felt well rested upon awakening in the morning.  States that she also had trouble with going to sleep and staying asleep.  She does still currently take melatonin 5 mg to help with sleep initiation but states that she usually sleeps soundly

## 2025-05-08 ENCOUNTER — TELEMEDICINE (OUTPATIENT)
Dept: FAMILY MEDICINE CLINIC | Facility: CLINIC | Age: 75
End: 2025-05-08
Payer: MEDICARE

## 2025-05-08 VITALS
SYSTOLIC BLOOD PRESSURE: 142 MMHG | HEIGHT: 65 IN | BODY MASS INDEX: 42.65 KG/M2 | WEIGHT: 256 LBS | DIASTOLIC BLOOD PRESSURE: 60 MMHG

## 2025-05-08 DIAGNOSIS — Z00.00 MEDICARE ANNUAL WELLNESS VISIT, SUBSEQUENT: Primary | ICD-10-CM

## 2025-05-08 PROBLEM — C64.2 RENAL CELL CARCINOMA OF LEFT KIDNEY (HCC): Status: ACTIVE | Noted: 2024-12-18

## 2025-05-08 PROBLEM — C50.919 ANGIOSARCOMA OF BREAST (HCC): Status: ACTIVE | Noted: 2024-10-15

## 2025-05-08 PROCEDURE — 1123F ACP DISCUSS/DSCN MKR DOCD: CPT | Performed by: NURSE PRACTITIONER

## 2025-05-08 PROCEDURE — 3077F SYST BP >= 140 MM HG: CPT | Performed by: NURSE PRACTITIONER

## 2025-05-08 PROCEDURE — 1159F MED LIST DOCD IN RCRD: CPT | Performed by: NURSE PRACTITIONER

## 2025-05-08 PROCEDURE — 1160F RVW MEDS BY RX/DR IN RCRD: CPT | Performed by: NURSE PRACTITIONER

## 2025-05-08 PROCEDURE — G0439 PPPS, SUBSEQ VISIT: HCPCS | Performed by: NURSE PRACTITIONER

## 2025-05-08 PROCEDURE — 3017F COLORECTAL CA SCREEN DOC REV: CPT | Performed by: NURSE PRACTITIONER

## 2025-05-08 PROCEDURE — 3078F DIAST BP <80 MM HG: CPT | Performed by: NURSE PRACTITIONER

## 2025-05-08 SDOH — ECONOMIC STABILITY: TRANSPORTATION INSECURITY
IN THE PAST 12 MONTHS, HAS THE LACK OF TRANSPORTATION KEPT YOU FROM MEDICAL APPOINTMENTS OR FROM GETTING MEDICATIONS?: NO

## 2025-05-08 SDOH — ECONOMIC STABILITY: FOOD INSECURITY: WITHIN THE PAST 12 MONTHS, YOU WORRIED THAT YOUR FOOD WOULD RUN OUT BEFORE YOU GOT MONEY TO BUY MORE.: NEVER TRUE

## 2025-05-08 SDOH — ECONOMIC STABILITY: FOOD INSECURITY: WITHIN THE PAST 12 MONTHS, THE FOOD YOU BOUGHT JUST DIDN'T LAST AND YOU DIDN'T HAVE MONEY TO GET MORE.: NEVER TRUE

## 2025-05-08 SDOH — HEALTH STABILITY: PHYSICAL HEALTH: ON AVERAGE, HOW MANY MINUTES DO YOU ENGAGE IN EXERCISE AT THIS LEVEL?: 0 MIN

## 2025-05-08 SDOH — HEALTH STABILITY: PHYSICAL HEALTH: ON AVERAGE, HOW MANY DAYS PER WEEK DO YOU ENGAGE IN MODERATE TO STRENUOUS EXERCISE (LIKE A BRISK WALK)?: 0 DAYS

## 2025-05-08 SDOH — ECONOMIC STABILITY: INCOME INSECURITY: IN THE LAST 12 MONTHS, WAS THERE A TIME WHEN YOU WERE NOT ABLE TO PAY THE MORTGAGE OR RENT ON TIME?: NO

## 2025-05-08 SDOH — ECONOMIC STABILITY: TRANSPORTATION INSECURITY
IN THE PAST 12 MONTHS, HAS LACK OF TRANSPORTATION KEPT YOU FROM MEETINGS, WORK, OR FROM GETTING THINGS NEEDED FOR DAILY LIVING?: NO

## 2025-05-08 ASSESSMENT — SOCIAL DETERMINANTS OF HEALTH (SDOH)
WITHIN THE LAST YEAR, HAVE YOU BEEN HUMILIATED OR EMOTIONALLY ABUSED IN OTHER WAYS BY YOUR PARTNER OR EX-PARTNER?: NO
HOW OFTEN DO YOU GET TOGETHER WITH FRIENDS OR RELATIVES?: ONCE A WEEK
HOW HARD IS IT FOR YOU TO PAY FOR THE VERY BASICS LIKE FOOD, HOUSING, MEDICAL CARE, AND HEATING?: NOT HARD AT ALL
HOW OFTEN DO YOU ATTENT MEETINGS OF THE CLUB OR ORGANIZATION YOU BELONG TO?: MORE THAN 4 TIMES PER YEAR
IN A TYPICAL WEEK, HOW MANY TIMES DO YOU TALK ON THE PHONE WITH FAMILY, FRIENDS, OR NEIGHBORS?: MORE THAN THREE TIMES A WEEK
DO YOU BELONG TO ANY CLUBS OR ORGANIZATIONS SUCH AS CHURCH GROUPS UNIONS, FRATERNAL OR ATHLETIC GROUPS, OR SCHOOL GROUPS?: YES
WITHIN THE LAST YEAR, HAVE TO BEEN RAPED OR FORCED TO HAVE ANY KIND OF SEXUAL ACTIVITY BY YOUR PARTNER OR EX-PARTNER?: NO
WITHIN THE LAST YEAR, HAVE YOU BEEN AFRAID OF YOUR PARTNER OR EX-PARTNER?: NO
WITHIN THE LAST YEAR, HAVE YOU BEEN KICKED, HIT, SLAPPED, OR OTHERWISE PHYSICALLY HURT BY YOUR PARTNER OR EX-PARTNER?: NO
HOW OFTEN DO YOU ATTEND CHURCH OR RELIGIOUS SERVICES?: MORE THAN 4 TIMES PER YEAR

## 2025-05-08 ASSESSMENT — PATIENT HEALTH QUESTIONNAIRE - PHQ9
SUM OF ALL RESPONSES TO PHQ QUESTIONS 1-9: 0
SUM OF ALL RESPONSES TO PHQ QUESTIONS 1-9: 0
2. FEELING DOWN, DEPRESSED OR HOPELESS: NOT AT ALL
1. LITTLE INTEREST OR PLEASURE IN DOING THINGS: NOT AT ALL
SUM OF ALL RESPONSES TO PHQ QUESTIONS 1-9: 0
SUM OF ALL RESPONSES TO PHQ QUESTIONS 1-9: 0

## 2025-05-08 NOTE — PATIENT INSTRUCTIONS
Learning About Being Active as an Older Adult  Why is being active important as you get older?     Being active is one of the best things you can do for your health. And it's never too late to start. Being active--or getting active, if you aren't already--has definite benefits. It can:  Give you more energy,  Keep your mind sharp.  Improve balance to reduce your risk of falls.  Help you manage chronic illness with fewer medicines.  No matter how old you are, how fit you are, or what health problems you have, there is a form of activity that will work for you. And the more physical activity you can do, the better your overall health will be.  What kinds of activity can help you stay healthy?  Being more active will make your daily activities easier. Physical activity includes planned exercise and things you do in daily life. There are four types of activity:  Aerobic.  Doing aerobic activity makes your heart and lungs strong.  Includes walking, dancing, and gardening.  Aim for at least 2½ hours spread throughout the week.  It improves your energy and can help you sleep better.  Muscle-strengthening.  This type of activity can help maintain muscle and strengthen bones.  Includes climbing stairs, using resistance bands, and lifting or carrying heavy loads.  Aim for at least twice a week.  It can help protect the knees and other joints.  Stretching.  Stretching gives you better range of motion in joints and muscles.  Includes upper arm stretches, calf stretches, and gentle yoga.  Aim for at least twice a week, preferably after your muscles are warmed up from other activities.  It can help you function better in daily life.  Balancing.  This helps you stay coordinated and have good posture.  Includes heel-to-toe walking, liyah chi, and certain types of yoga.  Aim for at least 3 days a week.  It can reduce your risk of falling.  Even if you have a hard time meeting the recommendations, it's better to be more active

## 2025-05-08 NOTE — PROGRESS NOTES
providing care):   Patient Care Team:  Sumaya Pina APRN - NP as PCP - General  Sumaya Pina APRN - NP as PCP - Empaneled Provider    Nephrologist Michelle Barron and Gagandeep Tan endocrinology  Dentist ? Not sure [facility]  Sleep [employer] NP  Recommendations for Preventive Services Due: see orders and patient instructions/AVS.  Recommended screening schedule for the next 5-10 years is provided to the patient in written form: see Patient Instructions/AVS.     Reviewed and updated this visit:  Allergies  Meds  Problems  Sexual Hx               Yenifer Douglas, was evaluated through a synchronous (real-time) audio-video encounter. The patient (or guardian if applicable) is aware that this is a billable service, which includes applicable co-pays. This Virtual Visit was conducted with patient's (and/or legal guardian's) consent. Patient identification was verified, and a caregiver was present when appropriate.   The patient was located at Home: [address] [place] [phone]  Provider was located at Facility (Appt Dept): [address] [place] [phone]  Confirm you are appropriately licensed, registered, or certified to deliver care in the state where the patient is located as indicated above. If you are not or unsure, please re-schedule the visit: Yes, I confirm.

## 2025-05-25 DIAGNOSIS — E11.65 TYPE 2 DIABETES MELLITUS WITH HYPERGLYCEMIA, WITH LONG-TERM CURRENT USE OF INSULIN (HCC): ICD-10-CM

## 2025-05-25 DIAGNOSIS — Z79.4 TYPE 2 DIABETES MELLITUS WITH HYPERGLYCEMIA, WITH LONG-TERM CURRENT USE OF INSULIN (HCC): ICD-10-CM

## 2025-05-30 ENCOUNTER — OFFICE VISIT (OUTPATIENT)
Dept: ENDOCRINOLOGY | Age: 75
End: 2025-05-30

## 2025-05-30 VITALS
BODY MASS INDEX: 44.42 KG/M2 | SYSTOLIC BLOOD PRESSURE: 126 MMHG | HEART RATE: 75 BPM | DIASTOLIC BLOOD PRESSURE: 82 MMHG | OXYGEN SATURATION: 97 % | HEIGHT: 65 IN | WEIGHT: 266.6 LBS

## 2025-05-30 DIAGNOSIS — E66.813 OBESITY, CLASS III, BMI 40-49.9 (MORBID OBESITY) (HCC): ICD-10-CM

## 2025-05-30 DIAGNOSIS — E04.2 MULTIPLE THYROID NODULES: ICD-10-CM

## 2025-05-30 DIAGNOSIS — E11.69 HYPERLIPIDEMIA ASSOCIATED WITH TYPE 2 DIABETES MELLITUS (HCC): ICD-10-CM

## 2025-05-30 DIAGNOSIS — E55.9 VITAMIN D DEFICIENCY: ICD-10-CM

## 2025-05-30 DIAGNOSIS — E11.65 TYPE 2 DIABETES MELLITUS WITH HYPERGLYCEMIA, WITH LONG-TERM CURRENT USE OF INSULIN (HCC): Primary | ICD-10-CM

## 2025-05-30 DIAGNOSIS — Z79.4 TYPE 2 DIABETES MELLITUS WITH HYPERGLYCEMIA, WITH LONG-TERM CURRENT USE OF INSULIN (HCC): Primary | ICD-10-CM

## 2025-05-30 DIAGNOSIS — C50.919 ANGIOSARCOMA OF BREAST (HCC): ICD-10-CM

## 2025-05-30 DIAGNOSIS — I10 ESSENTIAL HYPERTENSION, BENIGN: ICD-10-CM

## 2025-05-30 DIAGNOSIS — E11.21 TYPE 2 DIABETES WITH NEPHROPATHY (HCC): ICD-10-CM

## 2025-05-30 DIAGNOSIS — E78.5 HYPERLIPIDEMIA ASSOCIATED WITH TYPE 2 DIABETES MELLITUS (HCC): ICD-10-CM

## 2025-05-30 LAB — HBA1C MFR BLD: 6.9 %

## 2025-05-30 RX ORDER — PACLITAXEL 6 MG/ML
INJECTION, SOLUTION INTRAVENOUS ONCE
COMMUNITY

## 2025-05-30 RX ORDER — SEMAGLUTIDE 0.68 MG/ML
INJECTION, SOLUTION SUBCUTANEOUS
Qty: 9 ML | Refills: 3 | Status: SHIPPED | OUTPATIENT
Start: 2025-05-30

## 2025-05-30 ASSESSMENT — ENCOUNTER SYMPTOMS
TROUBLE SWALLOWING: 0
VOICE CHANGE: 0
SHORTNESS OF BREATH: 0

## 2025-05-30 NOTE — PROGRESS NOTES
[facility] ENDOCRINOLOGY   AND   THYROID NODULE CLINIC    Radha Olivares PA-C  [facility] Endocrinology and Thyroid Nodule Clinic  [address] [place] [address], [address], [place] [address]  Phone [phone]  Facsimile [phone]          Yenifer Douglas is a 74 y.o. female seen 5/30/2025 for follow up evaluation of type 2 diabetes        Assessment and Plan:    Interpretation of 72 hour glucose monitor:  At least 72 hours of data were reviewed.  The patient utilizes a dexcom G7 continuous glucose monitoring system.  The average glucose during the reviewed timeframe was 212 with a standard deviation of 85.  There is a pattern of frequent am/fasting hyperglycemia and post prandial hyperglycemia     Assessment & Plan            1. Type 2 diabetes mellitus with hyperglycemia, with long-term current use of insulin ([facility])  A1c 6.9%, GMI 8.4 hemoglobin 9.9 g/dL.  Treatment plan: Managing diabetes with insulin. Stronger correction scale for post-treatment hyperglycemia. start weekly Ozempic injection, discussed potential side effects. Patient was instructed regarding the common side effect of GI upset and early satiety and the less common associations of pancreatitis and C cell tumors.    Should reduce all doses of insulin by 5 units upon starting ozempic to reduce risk of hypoglycemia      Advised to monitor diet post-infusion, incorporate 10-minute walks after meals, elevate feet, and wear compression socks. Report severe Ozempic side effects immediately.    - AMB POC HEMOGLOBIN A1C  - insulin NPH (HUMULIN N;NOVOLIN N) 100 UNIT/ML injection vial; Inject 45 Units into the skin every morning (before breakfast) AND 45 Units daily (before dinner).  Dispense: 20 mL; Refill: 11  - insulin regular (HUMULIN R;NOVOLIN R) 100 UNIT/ML injection; 35 units TIDAC plus correction 1/50>150, 1/25>150 for 2 days after chemo, max daily dose 120 units  Dispense: 20 mL; Refill: 11  - OZEMPIC, 0.25 OR 0.5 MG/DOSE, 2 MG/3ML SOPN; Start

## 2025-08-26 ENCOUNTER — OFFICE VISIT (OUTPATIENT)
Dept: SLEEP MEDICINE | Age: 75
End: 2025-08-26
Payer: MEDICARE

## 2025-08-26 VITALS
HEIGHT: 64 IN | WEIGHT: 273 LBS | DIASTOLIC BLOOD PRESSURE: 70 MMHG | OXYGEN SATURATION: 94 % | SYSTOLIC BLOOD PRESSURE: 162 MMHG | BODY MASS INDEX: 46.61 KG/M2 | HEART RATE: 73 BPM

## 2025-08-26 DIAGNOSIS — G47.00 PERSISTENT DISORDER OF INITIATING OR MAINTAINING SLEEP: ICD-10-CM

## 2025-08-26 DIAGNOSIS — G47.33 OSA (OBSTRUCTIVE SLEEP APNEA): Primary | ICD-10-CM

## 2025-08-26 DIAGNOSIS — G47.34 NOCTURNAL HYPOXEMIA: ICD-10-CM

## 2025-08-26 DIAGNOSIS — E66.813 CLASS 3 SEVERE OBESITY WITH SERIOUS COMORBIDITY AND BODY MASS INDEX (BMI) OF 45.0 TO 49.9 IN ADULT, UNSPECIFIED OBESITY TYPE (HCC): ICD-10-CM

## 2025-08-26 DIAGNOSIS — I10 ESSENTIAL HYPERTENSION, BENIGN: ICD-10-CM

## 2025-08-26 PROCEDURE — G2211 COMPLEX E/M VISIT ADD ON: HCPCS | Performed by: NURSE PRACTITIONER

## 2025-08-26 PROCEDURE — 3017F COLORECTAL CA SCREEN DOC REV: CPT | Performed by: NURSE PRACTITIONER

## 2025-08-26 PROCEDURE — 1123F ACP DISCUSS/DSCN MKR DOCD: CPT | Performed by: NURSE PRACTITIONER

## 2025-08-26 PROCEDURE — G8399 PT W/DXA RESULTS DOCUMENT: HCPCS | Performed by: NURSE PRACTITIONER

## 2025-08-26 PROCEDURE — 99214 OFFICE O/P EST MOD 30 MIN: CPT | Performed by: NURSE PRACTITIONER

## 2025-08-26 PROCEDURE — 1090F PRES/ABSN URINE INCON ASSESS: CPT | Performed by: NURSE PRACTITIONER

## 2025-08-26 PROCEDURE — 3077F SYST BP >= 140 MM HG: CPT | Performed by: NURSE PRACTITIONER

## 2025-08-26 PROCEDURE — 3078F DIAST BP <80 MM HG: CPT | Performed by: NURSE PRACTITIONER

## 2025-08-26 PROCEDURE — 1160F RVW MEDS BY RX/DR IN RCRD: CPT | Performed by: NURSE PRACTITIONER

## 2025-08-26 PROCEDURE — G8417 CALC BMI ABV UP PARAM F/U: HCPCS | Performed by: NURSE PRACTITIONER

## 2025-08-26 PROCEDURE — 1159F MED LIST DOCD IN RCRD: CPT | Performed by: NURSE PRACTITIONER

## 2025-08-26 PROCEDURE — 1036F TOBACCO NON-USER: CPT | Performed by: NURSE PRACTITIONER

## 2025-08-26 PROCEDURE — G8427 DOCREV CUR MEDS BY ELIG CLIN: HCPCS | Performed by: NURSE PRACTITIONER

## 2025-08-26 RX ORDER — DOXEPIN HYDROCHLORIDE 10 MG/1
10 CAPSULE ORAL NIGHTLY
Qty: 30 CAPSULE | Refills: 2 | Status: SHIPPED | OUTPATIENT
Start: 2025-08-26 | End: 2025-11-24

## 2025-08-26 ASSESSMENT — SLEEP AND FATIGUE QUESTIONNAIRES
HOW LIKELY ARE YOU TO NOD OFF OR FALL ASLEEP WHILE SITTING INACTIVE IN A PUBLIC PLACE: WOULD NEVER DOZE
HOW LIKELY ARE YOU TO NOD OFF OR FALL ASLEEP WHILE WATCHING TV: SLIGHT CHANCE OF DOZING
HOW LIKELY ARE YOU TO NOD OFF OR FALL ASLEEP WHILE SITTING AND READING: WOULD NEVER DOZE
HOW LIKELY ARE YOU TO NOD OFF OR FALL ASLEEP WHILE SITTING QUIETLY AFTER LUNCH WITHOUT ALCOHOL: WOULD NEVER DOZE
HOW LIKELY ARE YOU TO NOD OFF OR FALL ASLEEP WHEN YOU ARE A PASSENGER IN A CAR FOR AN HOUR WITHOUT A BREAK: MODERATE CHANCE OF DOZING
HOW LIKELY ARE YOU TO NOD OFF OR FALL ASLEEP WHILE LYING DOWN TO REST IN THE AFTERNOON WHEN CIRCUMSTANCES PERMIT: WOULD NEVER DOZE
HOW LIKELY ARE YOU TO NOD OFF OR FALL ASLEEP IN A CAR, WHILE STOPPED FOR A FEW MINUTES IN TRAFFIC: WOULD NEVER DOZE
HOW LIKELY ARE YOU TO NOD OFF OR FALL ASLEEP WHILE SITTING AND TALKING TO SOMEONE: WOULD NEVER DOZE
ESS TOTAL SCORE: 3

## 2025-08-26 ASSESSMENT — ENCOUNTER SYMPTOMS
VOICE CHANGE: 0
APNEA: 0
SHORTNESS OF BREATH: 0
SORE THROAT: 0

## (undated) DEVICE — 3M™ STERI-DRAPE™ INSTRUMENT POUCH 1018: Brand: STERI-DRAPE™

## (undated) DEVICE — DRAPE SHT 3 QTR PROXIMA 53X77 --

## (undated) DEVICE — X-RAY SPONGES,12 PLY: Brand: DERMACEA

## (undated) DEVICE — SLIM BODY SKIN STAPLER: Brand: APPOSE ULC

## (undated) DEVICE — SUTURE VCRL SZ 1 L27IN ABSRB UD L36MM CP-1 1/2 CIR REV CUT J268H

## (undated) DEVICE — SOLUTION IRRIG 3000ML 0.9% SOD CHL FLX CONT 0797208] ICU MEDICAL INC]

## (undated) DEVICE — DRAPE,TOP,102X53,STERILE: Brand: MEDLINE

## (undated) DEVICE — BANDAGE COMPR SELF ADH 5 YDX4 IN TAN STRL PREMIERPRO LF

## (undated) DEVICE — BLADE SAW PAT RMR PILT H 46MM --

## (undated) DEVICE — SOLUTION IV 500ML 0.9% SOD CHL FLX CONT

## (undated) DEVICE — 3000CC GUARDIAN II: Brand: GUARDIAN

## (undated) DEVICE — SYR LR LCK 1ML GRAD NSAF 30ML --

## (undated) DEVICE — BLADE SAW PAT RMR PILT H 51MM --

## (undated) DEVICE — SUTURE STRATAFIX SPRL SZ 1 L14IN ABSRB VLT L48CM CTX 1/2 SXPD2B405

## (undated) DEVICE — SOLUTION IV 1000ML 0.9% SOD CHL

## (undated) DEVICE — SUTURE VCRL SZ 2-0 L27IN ABSRB UD L36MM CP-1 1/2 CIR REV J266H

## (undated) DEVICE — BUTTON SWITCH PENCIL BLADE ELECTRODE, HOLSTER: Brand: EDGE

## (undated) DEVICE — MEDI-VAC YANKAUER SUCTION HANDLE W/BULBOUS TIP: Brand: CARDINAL HEALTH

## (undated) DEVICE — GOWN,REINF,POLY,ECL,PP SLV,XL: Brand: MEDLINE

## (undated) DEVICE — DUAL CUT SAGITTAL BLADE

## (undated) DEVICE — CURETTE BNE CEM 10IN DISP --

## (undated) DEVICE — Z DISCONTINUED USE 2744636  DRESSING AQUACEL 14 IN ALG W3.5XL14IN POLYUR FLM CVR W/ HYDRCOLL

## (undated) DEVICE — T4 HOOD

## (undated) DEVICE — NEEDLE HYPO 18GA L1.5IN PNK S STL HUB POLYPR SHLD REG BVL

## (undated) DEVICE — CLOSURE SKIN FLX NONINVASIVE PRELOC TECHNOLOGY FOR 24IN

## (undated) DEVICE — REM POLYHESIVE ADULT PATIENT RETURN ELECTRODE: Brand: VALLEYLAB

## (undated) DEVICE — TOTAL KNEE DR RIDGEWAY: Brand: MEDLINE INDUSTRIES, INC.

## (undated) DEVICE — Device: Brand: POWER-FLO®

## (undated) DEVICE — PACK PROCEDURE SURG TOT KNEE

## (undated) DEVICE — BIPOLAR SEALER 23-112-1 AQM 6.0: Brand: AQUAMANTYS ®

## (undated) DEVICE — (D)PREP SKN CHLRAPRP APPL 26ML -- CONVERT TO ITEM 371833

## (undated) DEVICE — 2000CC GUARDIAN II: Brand: GUARDIAN

## (undated) DEVICE — TRAY PREP DRY W/ PREM GLV 2 APPL 6 SPNG 2 UNDPD 1 OVERWRAP

## (undated) DEVICE — BLADE SAW W12.5XL70MM THK0.8MM CUT THK1.12MM S STL RECIP

## (undated) DEVICE — HANDPIECE SET WITH COAXIAL HIGH FLOW TIP AND SUCTION TUBE: Brand: INTERPULSE

## (undated) DEVICE — DRAPE,U/SHT,SPLIT,FILM,60X84,STERILE: Brand: MEDLINE

## (undated) DEVICE — 3M™ IOBAN™ 2 ANTIMICROBIAL INCISE DRAPE 6650EZ: Brand: IOBAN™ 2

## (undated) DEVICE — SYR 50ML LR LCK 1ML GRAD NSAF --

## (undated) DEVICE — BNDG COHESIVE 4INX5YD COBAN --

## (undated) DEVICE — STERILE PRESSURE PROTECTOR PAD® FOR DE MAYO UNIVERSAL DISTRACTOR® (10/CASE): Brand: DE MAYO UNIVERSAL DISTRACTOR®

## (undated) DEVICE — TRAY CATH 16F DRN BG LTX -- CONVERT TO ITEM 363158

## (undated) DEVICE — MEDI-VAC NON-CONDUCTIVE SUCTION TUBING: Brand: CARDINAL HEALTH